# Patient Record
Sex: FEMALE | Race: BLACK OR AFRICAN AMERICAN | Employment: OTHER | URBAN - METROPOLITAN AREA
[De-identification: names, ages, dates, MRNs, and addresses within clinical notes are randomized per-mention and may not be internally consistent; named-entity substitution may affect disease eponyms.]

---

## 2021-11-19 ENCOUNTER — APPOINTMENT (OUTPATIENT)
Dept: CT IMAGING | Age: 76
DRG: 683 | End: 2021-11-19
Attending: EMERGENCY MEDICINE
Payer: MEDICARE

## 2021-11-19 ENCOUNTER — HOSPITAL ENCOUNTER (INPATIENT)
Age: 76
LOS: 3 days | Discharge: HOME | DRG: 683 | End: 2021-11-22
Attending: STUDENT IN AN ORGANIZED HEALTH CARE EDUCATION/TRAINING PROGRAM | Admitting: HOSPITALIST
Payer: MEDICARE

## 2021-11-19 DIAGNOSIS — E87.1 HYPONATREMIA: ICD-10-CM

## 2021-11-19 DIAGNOSIS — R11.2 NAUSEA AND VOMITING IN ADULT PATIENT: ICD-10-CM

## 2021-11-19 DIAGNOSIS — E86.0 DEHYDRATION: ICD-10-CM

## 2021-11-19 DIAGNOSIS — N17.9 AKI (ACUTE KIDNEY INJURY) (HCC): Primary | ICD-10-CM

## 2021-11-19 DIAGNOSIS — N39.0 ACUTE UTI: ICD-10-CM

## 2021-11-19 LAB
ALBUMIN SERPL-MCNC: 3.7 G/DL (ref 3.4–5)
ALBUMIN/GLOB SERPL: 0.7 {RATIO} (ref 0.8–1.7)
ALP SERPL-CCNC: 89 U/L (ref 45–117)
ALT SERPL-CCNC: 20 U/L (ref 13–56)
ANION GAP SERPL CALC-SCNC: 12 MMOL/L (ref 3–18)
APPEARANCE UR: ABNORMAL
AST SERPL-CCNC: 17 U/L (ref 10–38)
ATRIAL RATE: 110 BPM
BACTERIA URNS QL MICRO: ABNORMAL /HPF
BASOPHILS # BLD: 0.1 K/UL (ref 0–0.1)
BASOPHILS NFR BLD: 0 % (ref 0–2)
BILIRUB SERPL-MCNC: 0.4 MG/DL (ref 0.2–1)
BILIRUB UR QL: ABNORMAL
BUN SERPL-MCNC: 51 MG/DL (ref 7–18)
BUN/CREAT SERPL: 24 (ref 12–20)
CALCIUM SERPL-MCNC: 10.7 MG/DL (ref 8.5–10.1)
CALCULATED P AXIS, ECG09: 50 DEGREES
CALCULATED R AXIS, ECG10: -23 DEGREES
CALCULATED T AXIS, ECG11: 49 DEGREES
CHLORIDE SERPL-SCNC: 95 MMOL/L (ref 100–111)
CO2 SERPL-SCNC: 23 MMOL/L (ref 21–32)
COLOR UR: ABNORMAL
COVID-19 RAPID TEST, COVR: NOT DETECTED
CREAT SERPL-MCNC: 2.11 MG/DL (ref 0.6–1.3)
DIAGNOSIS, 93000: NORMAL
DIFFERENTIAL METHOD BLD: ABNORMAL
EOSINOPHIL # BLD: 0.1 K/UL (ref 0–0.4)
EOSINOPHIL NFR BLD: 1 % (ref 0–5)
EPITH CASTS URNS QL MICRO: ABNORMAL /LPF (ref 0–5)
ERYTHROCYTE [DISTWIDTH] IN BLOOD BY AUTOMATED COUNT: 13.5 % (ref 11.6–14.5)
EST. AVERAGE GLUCOSE BLD GHB EST-MCNC: 209 MG/DL
GLOBULIN SER CALC-MCNC: 5.1 G/DL (ref 2–4)
GLUCOSE BLD STRIP.AUTO-MCNC: 131 MG/DL (ref 70–110)
GLUCOSE BLD STRIP.AUTO-MCNC: 159 MG/DL (ref 70–110)
GLUCOSE SERPL-MCNC: 196 MG/DL (ref 74–99)
GLUCOSE UR STRIP.AUTO-MCNC: NEGATIVE MG/DL
HBA1C MFR BLD: 8.9 % (ref 4.2–5.6)
HCT VFR BLD AUTO: 40.1 % (ref 35–45)
HGB BLD-MCNC: 13 G/DL (ref 12–16)
HGB UR QL STRIP: ABNORMAL
IMM GRANULOCYTES # BLD AUTO: 0.1 K/UL (ref 0–0.04)
IMM GRANULOCYTES NFR BLD AUTO: 1 % (ref 0–0.5)
INR PPP: 1 (ref 0.8–1.2)
KETONES UR QL STRIP.AUTO: NEGATIVE MG/DL
LEUKOCYTE ESTERASE UR QL STRIP.AUTO: ABNORMAL
LYMPHOCYTES # BLD: 3.1 K/UL (ref 0.9–3.6)
LYMPHOCYTES NFR BLD: 20 % (ref 21–52)
MCH RBC QN AUTO: 28.2 PG (ref 24–34)
MCHC RBC AUTO-ENTMCNC: 32.4 G/DL (ref 31–37)
MCV RBC AUTO: 87 FL (ref 78–100)
MONOCYTES # BLD: 2.2 K/UL (ref 0.05–1.2)
MONOCYTES NFR BLD: 14 % (ref 3–10)
NEUTS SEG # BLD: 9.8 K/UL (ref 1.8–8)
NEUTS SEG NFR BLD: 64 % (ref 40–73)
NITRITE UR QL STRIP.AUTO: NEGATIVE
NRBC # BLD: 0 K/UL (ref 0–0.01)
NRBC BLD-RTO: 0 PER 100 WBC
P-R INTERVAL, ECG05: 172 MS
PH UR STRIP: 7.5 [PH] (ref 5–8)
PLATELET # BLD AUTO: 517 K/UL (ref 135–420)
PMV BLD AUTO: 9.8 FL (ref 9.2–11.8)
POTASSIUM SERPL-SCNC: 4.1 MMOL/L (ref 3.5–5.5)
PROT SERPL-MCNC: 8.8 G/DL (ref 6.4–8.2)
PROT UR STRIP-MCNC: 300 MG/DL
PROTHROMBIN TIME: 13.1 SEC (ref 11.5–15.2)
Q-T INTERVAL, ECG07: 336 MS
QRS DURATION, ECG06: 60 MS
QTC CALCULATION (BEZET), ECG08: 454 MS
RBC # BLD AUTO: 4.61 M/UL (ref 4.2–5.3)
RBC #/AREA URNS HPF: ABNORMAL /HPF (ref 0–5)
SODIUM SERPL-SCNC: 130 MMOL/L (ref 136–145)
SOURCE, COVRS: NORMAL
SP GR UR REFRACTOMETRY: 1.02 (ref 1–1.03)
TROPONIN I SERPL-MCNC: <0.02 NG/ML (ref 0–0.04)
UROBILINOGEN UR QL STRIP.AUTO: 0.2 EU/DL (ref 0.2–1)
VENTRICULAR RATE, ECG03: 110 BPM
WBC # BLD AUTO: 15.4 K/UL (ref 4.6–13.2)
WBC URNS QL MICRO: ABNORMAL /HPF (ref 0–5)

## 2021-11-19 PROCEDURE — 93005 ELECTROCARDIOGRAM TRACING: CPT

## 2021-11-19 PROCEDURE — 74011250636 HC RX REV CODE- 250/636: Performed by: EMERGENCY MEDICINE

## 2021-11-19 PROCEDURE — 84484 ASSAY OF TROPONIN QUANT: CPT

## 2021-11-19 PROCEDURE — 80053 COMPREHEN METABOLIC PANEL: CPT

## 2021-11-19 PROCEDURE — 85610 PROTHROMBIN TIME: CPT

## 2021-11-19 PROCEDURE — 65270000029 HC RM PRIVATE

## 2021-11-19 PROCEDURE — 85025 COMPLETE CBC W/AUTO DIFF WBC: CPT

## 2021-11-19 PROCEDURE — 74011000250 HC RX REV CODE- 250: Performed by: EMERGENCY MEDICINE

## 2021-11-19 PROCEDURE — 81001 URINALYSIS AUTO W/SCOPE: CPT

## 2021-11-19 PROCEDURE — 74011250636 HC RX REV CODE- 250/636: Performed by: HOSPITALIST

## 2021-11-19 PROCEDURE — 87635 SARS-COV-2 COVID-19 AMP PRB: CPT

## 2021-11-19 PROCEDURE — 74011636637 HC RX REV CODE- 636/637: Performed by: HOSPITALIST

## 2021-11-19 PROCEDURE — 99284 EMERGENCY DEPT VISIT MOD MDM: CPT

## 2021-11-19 PROCEDURE — 96360 HYDRATION IV INFUSION INIT: CPT

## 2021-11-19 PROCEDURE — 96361 HYDRATE IV INFUSION ADD-ON: CPT

## 2021-11-19 PROCEDURE — 74176 CT ABD & PELVIS W/O CONTRAST: CPT

## 2021-11-19 PROCEDURE — 74011250637 HC RX REV CODE- 250/637: Performed by: HOSPITALIST

## 2021-11-19 PROCEDURE — 82962 GLUCOSE BLOOD TEST: CPT

## 2021-11-19 PROCEDURE — 87086 URINE CULTURE/COLONY COUNT: CPT

## 2021-11-19 PROCEDURE — 83036 HEMOGLOBIN GLYCOSYLATED A1C: CPT

## 2021-11-19 RX ORDER — PROMETHAZINE HYDROCHLORIDE 25 MG/1
12.5 TABLET ORAL
Status: DISCONTINUED | OUTPATIENT
Start: 2021-11-19 | End: 2021-11-22 | Stop reason: HOSPADM

## 2021-11-19 RX ORDER — ROPINIROLE 1 MG/1
0.5 TABLET, FILM COATED ORAL DAILY
COMMUNITY

## 2021-11-19 RX ORDER — ACETAMINOPHEN 325 MG/1
650 TABLET ORAL
Status: DISCONTINUED | OUTPATIENT
Start: 2021-11-19 | End: 2021-11-22 | Stop reason: HOSPADM

## 2021-11-19 RX ORDER — SODIUM CHLORIDE 0.9 % (FLUSH) 0.9 %
5-40 SYRINGE (ML) INJECTION EVERY 8 HOURS
Status: DISCONTINUED | OUTPATIENT
Start: 2021-11-19 | End: 2021-11-22 | Stop reason: HOSPADM

## 2021-11-19 RX ORDER — LISINOPRIL AND HYDROCHLOROTHIAZIDE 12.5; 2 MG/1; MG/1
1 TABLET ORAL DAILY
COMMUNITY
End: 2021-11-22

## 2021-11-19 RX ORDER — MONTELUKAST SODIUM 10 MG/1
10 TABLET ORAL DAILY
Status: DISCONTINUED | OUTPATIENT
Start: 2021-11-20 | End: 2021-11-20

## 2021-11-19 RX ORDER — INSULIN LISPRO 100 [IU]/ML
INJECTION, SOLUTION INTRAVENOUS; SUBCUTANEOUS
Status: DISCONTINUED | OUTPATIENT
Start: 2021-11-19 | End: 2021-11-21

## 2021-11-19 RX ORDER — ROSUVASTATIN CALCIUM 10 MG/1
10 TABLET, COATED ORAL
COMMUNITY

## 2021-11-19 RX ORDER — DEXTROSE 50 % IN WATER (D50W) INTRAVENOUS SYRINGE
25-50 AS NEEDED
Status: DISCONTINUED | OUTPATIENT
Start: 2021-11-19 | End: 2021-11-22 | Stop reason: HOSPADM

## 2021-11-19 RX ORDER — ROSUVASTATIN CALCIUM 10 MG/1
10 TABLET, COATED ORAL
Status: DISCONTINUED | OUTPATIENT
Start: 2021-11-19 | End: 2021-11-22 | Stop reason: HOSPADM

## 2021-11-19 RX ORDER — HEPARIN SODIUM 5000 [USP'U]/ML
5000 INJECTION, SOLUTION INTRAVENOUS; SUBCUTANEOUS EVERY 8 HOURS
Status: DISCONTINUED | OUTPATIENT
Start: 2021-11-19 | End: 2021-11-22 | Stop reason: HOSPADM

## 2021-11-19 RX ORDER — MONTELUKAST SODIUM 10 MG/1
10 TABLET ORAL DAILY
COMMUNITY

## 2021-11-19 RX ORDER — INSULIN ASPART 100 [IU]/ML
INJECTION, SUSPENSION SUBCUTANEOUS
COMMUNITY

## 2021-11-19 RX ORDER — AMLODIPINE BESYLATE 5 MG/1
5 TABLET ORAL DAILY
Status: DISCONTINUED | OUTPATIENT
Start: 2021-11-20 | End: 2021-11-22 | Stop reason: HOSPADM

## 2021-11-19 RX ORDER — INSULIN GLARGINE 100 [IU]/ML
20 INJECTION, SOLUTION SUBCUTANEOUS
Status: DISCONTINUED | OUTPATIENT
Start: 2021-11-19 | End: 2021-11-22 | Stop reason: HOSPADM

## 2021-11-19 RX ORDER — SODIUM CHLORIDE 9 MG/ML
75 INJECTION, SOLUTION INTRAVENOUS CONTINUOUS
Status: DISCONTINUED | OUTPATIENT
Start: 2021-11-19 | End: 2021-11-22 | Stop reason: HOSPADM

## 2021-11-19 RX ORDER — ACETAMINOPHEN 650 MG/1
650 SUPPOSITORY RECTAL
Status: DISCONTINUED | OUTPATIENT
Start: 2021-11-19 | End: 2021-11-22 | Stop reason: HOSPADM

## 2021-11-19 RX ORDER — MAGNESIUM SULFATE 100 %
4 CRYSTALS MISCELLANEOUS AS NEEDED
Status: DISCONTINUED | OUTPATIENT
Start: 2021-11-19 | End: 2021-11-22 | Stop reason: HOSPADM

## 2021-11-19 RX ORDER — ONDANSETRON 2 MG/ML
4 INJECTION INTRAMUSCULAR; INTRAVENOUS
Status: DISCONTINUED | OUTPATIENT
Start: 2021-11-19 | End: 2021-11-22 | Stop reason: HOSPADM

## 2021-11-19 RX ORDER — FACIAL-BODY WIPES
10 EACH TOPICAL DAILY PRN
Status: DISCONTINUED | OUTPATIENT
Start: 2021-11-19 | End: 2021-11-22 | Stop reason: HOSPADM

## 2021-11-19 RX ORDER — SODIUM CHLORIDE 0.9 % (FLUSH) 0.9 %
5-40 SYRINGE (ML) INJECTION AS NEEDED
Status: DISCONTINUED | OUTPATIENT
Start: 2021-11-19 | End: 2021-11-22 | Stop reason: HOSPADM

## 2021-11-19 RX ADMIN — HEPARIN SODIUM 5000 UNITS: 5000 INJECTION INTRAVENOUS; SUBCUTANEOUS at 22:59

## 2021-11-19 RX ADMIN — WATER 1 G: 1 INJECTION INTRAMUSCULAR; INTRAVENOUS; SUBCUTANEOUS at 15:24

## 2021-11-19 RX ADMIN — SODIUM CHLORIDE 75 ML/HR: 900 INJECTION, SOLUTION INTRAVENOUS at 16:43

## 2021-11-19 RX ADMIN — INSULIN LISPRO 2 UNITS: 100 INJECTION, SOLUTION INTRAVENOUS; SUBCUTANEOUS at 22:58

## 2021-11-19 RX ADMIN — SODIUM CHLORIDE 1000 ML: 9 INJECTION, SOLUTION INTRAVENOUS at 12:46

## 2021-11-19 RX ADMIN — HEPARIN SODIUM 5000 UNITS: 5000 INJECTION INTRAVENOUS; SUBCUTANEOUS at 16:21

## 2021-11-19 RX ADMIN — ROSUVASTATIN CALCIUM 10 MG: 10 TABLET, COATED ORAL at 22:58

## 2021-11-19 RX ADMIN — INSULIN GLARGINE 20 UNITS: 100 INJECTION, SOLUTION SUBCUTANEOUS at 22:58

## 2021-11-19 RX ADMIN — Medication 10 ML: at 22:58

## 2021-11-19 RX ADMIN — Medication 10 ML: at 16:42

## 2021-11-19 RX ADMIN — SODIUM CHLORIDE 1000 ML: 9 INJECTION, SOLUTION INTRAVENOUS at 11:04

## 2021-11-19 NOTE — ED TRIAGE NOTES
Ambulatory patient arrives with  with complaints of UTI x10 days. Was sent here by PCP for further evaluation. C/o general malaise.

## 2021-11-19 NOTE — H&P
History & Physical    Patient: Nancy Delaney MRN: [id number]  CSN: [id number]    YOB: 1945  Age: 68 y.o. Sex: female      DOA: 11/19/2021  Primary Care Provider:  Concepcion Varela MD      Assessment/Plan     Hospital Problems  Never Reviewed          Codes Class Noted POA    Dehydration ICD-10-CM: E86.0  ICD-9-CM: 276.51  11/19/2021 Unknown        Hyponatremia ICD-10-CM: E87.1  ICD-9-CM: 276.1  11/19/2021 Unknown        Acute UTI ICD-10-CM: N39.0  ICD-9-CM: 599.0  11/19/2021 Unknown        JOHNATHAN (acute kidney injury) ([address] ICD-10-CM: N17.9  ICD-9-CM: 584.9  11/19/2021 Unknown        Diabetes ([address] ICD-10-CM: E11.9  ICD-9-CM: 250.00  Unknown Unknown        Hypertension ICD-10-CM: I10  ICD-9-CM: 401.9  Unknown Unknown            Nancy Delaney is a 68 y.o. female with hypertension, diabetes was admitted due to uti failure of out-pt treatment, dehydration and johnathan     Admit to medical     Urinary tract infection  Failed outpatient treatment  Changed to cefepime  Follow-up with UA culture    Dehydration  IV hydration  Monitor urine output    Acute renal failure  Like due to prerenal  Mild left hydroureteronephrosis without evidence of obstructing lesion. Avoid IV contrast, IV hydration. Renal dose medication avoid NSAIDs    Hyponatremia  NS infusion  Received 2 L normal saline bolus  Continue monitoring    DM type II ,  -on lantus,  ssi, diabetic diet , hypoglycemia protocol       HTN, accelerated  Hold home losartan due to johnathan, put norvasc      full code   Please note that this dictation was completed with [employer], the [employer] voice recognition software. Quite often unanticipated grammatical, syntax, homophones, and other interpretive errors are inadvertently transcribed by the computer software. Please disregard these errors.   Please excuse any errors that have escaped final proofreading    Estimate  length of stay : tbd     DVT : heparin ppi proph  CC: Dysuria       HPI: Tamika Patel is a 68 y.o. female with hypertension, diabetes, presented to ER due to dysuria, increasing frequency of urination. She has been treated as urinary tract infection per [facility] urgent care for  9 days ago. She completed p.o. antibiotics cefdinir from record. Symptoms does not improving she presented nausea or vomiting with diarrhea for 2-3 days due to abx, but resolved now. UA indicated urinary tract infection, CT abdomen cystitis and mild  hydronephrosis without obstruction. She presented with a leukocytosis, creatinine 2.1 sodium 130. Received covid 19 vaccine and no coivd 19 exposure. Denies any slurred speech/headache/cp/n/v/blurred vission/d/c/palpitation/gait change/bleeding. Denies smoking/ any alcohol or drug use.  was at the bedside. Visit Vitals  /62   Pulse 98   Temp 97 °F (36.1 °C)   Resp 16   Ht 5' 6\" (1.676 m)   Wt 85.7 kg (189 lb)   SpO2 97%   BMI 30.51 kg/m²      O2 Device: None (Room air)      Past Medical History:   Diagnosis Date    Diabetes ([address]     Hypertension        Past Surgical History:   Procedure Laterality Date    HX HYSTERECTOMY      HX ORTHOPAEDIC      right shoulder    NM BREAST SURGERY PROCEDURE UNLISTED       Fhx: HTN and DM     Social History     Socioeconomic History    Marital status:    Tobacco Use    Smoking status: Never Smoker    Smokeless tobacco: Never Used   Substance and Sexual Activity    Alcohol use: Not Currently    Drug use: Never       Prior to Admission medications    Medication Sig Start Date End Date Taking? Authorizing Provider   insulin aspart protamine/insulin aspart (NovoLOG Mix 70-30FlexPen U-100) 100 unit/mL (70-30) inpn by SubCUTAneous route. Yes Adrian, MD Danish   exenatide (BYETTA) 5 mcg/dose (250 mcg/mL) 1.2 mL injection 5 mcg by SubCUTAneous route every seven (7) days. Yes Adrian, MD Danish   SITagliptin (Januvia) 25 mg tablet Take 25 mg by mouth daily.    Yes Danish Campbell MD rosuvastatin (Crestor) 10 mg tablet Take 10 mg by mouth nightly. Yes Other, MD Danish   montelukast (SINGULAIR) 10 mg tablet Take 10 mg by mouth daily. Yes Adrian, MD Danish   eye lubricant combination no.1 2-0.9-1.8 % drop Apply  to eye. Yes Other, MD Danish   lisinopril-hydroCHLOROthiazide (PRINZIDE, ZESTORETIC) 20-12.5 mg per tablet Take 1 Tablet by mouth daily. Yes Other, MD Danish       Allergies   Allergen Reactions    Bacitracin Rash       Review of Systems  Gen: No fever, chills, malaise, weight loss/gain. Heent: No headache, rhinorrhea, epistaxis, ear pain, hearing loss, sinus pain, neck pain/stiffness, sore throat. Heart: No chest pain, palpitations, JERRY, pnd, or orthopnea. Resp: No cough, hemoptysis, wheezing and shortness of breath. GI: No nausea, vomiting, + diarrhea-resolved now , constipation, melena or hematochezia. + lower abdomen pain   : No urinary obstruction, + dysuria , no hematuria. Increased urinary frequency   Derm: No rash, new skin lesion or pruritis. Musc/skeletal: no bone or joint complains. Vasc: No edema, cyanosis or claudication. Endo: No heat/cold intolerance, no polyuria,polydipsia or polyphagia. Neuro: No unilateral weakness, numbness, tingling. No seizures. Heme: No easy bruising or bleeding. Physical Exam:     Physical Exam:  Visit Vitals  /62   Pulse 98   Temp 97 °F (36.1 °C)   Resp 16   Ht 5' 6\" (1.676 m)   Wt 85.7 kg (189 lb)   SpO2 97%   BMI 30.51 kg/m²      O2 Device: None (Room air)    Temp (24hrs), Av °F (36.1 °C), Min:97 °F (36.1 °C), Max:97 °F (36.1 °C)    No intake/output data recorded. No intake/output data recorded. General:  Awake, cooperative, no distress. Head:  Normocephalic, without obvious abnormality, atraumatic. Eyes:  Conjunctivae/corneas clear, sclera anicteric, PERRL, EOMs intact. Nose: Nares normal. No drainage or sinus tenderness.    Throat: Lips, mucosa, and tongue normal. .   Neck: Supple, symmetrical, trachea midline, no adenopathy. Lungs:   Clear to auscultation bilaterally. Heart:  Regular rate and rhythm, S1, S2 normal, no murmur, click, rub or gallop. Abdomen: Soft, mild superpubic tender. Bowel sounds normal. No masses,  No organomegaly. Extremities: Extremities normal, atraumatic, no cyanosis or edema. Pulses: 2+ and symmetric all extremities. Skin: Skin color-pink, texture, turgor normal. No rashes or lesions. Capillary refill normal    Neurologic: CNII-XII intact. No focal motor or sensory deficit.        Labs Reviewed:    BMP:   Lab Results   Component Value Date/Time     (L) 11/19/2021 11:01 AM    K 4.1 11/19/2021 11:01 AM    CL 95 (L) 11/19/2021 11:01 AM    CO2 23 11/19/2021 11:01 AM    AGAP 12 11/19/2021 11:01 AM     (H) 11/19/2021 11:01 AM    BUN 51 (H) 11/19/2021 11:01 AM    CREA 2.11 (H) 11/19/2021 11:01 AM    GFRAA 28 (L) 11/19/2021 11:01 AM    GFRNA 23 (L) 11/19/2021 11:01 AM     CMP:   Lab Results   Component Value Date/Time     (L) 11/19/2021 11:01 AM    K 4.1 11/19/2021 11:01 AM    CL 95 (L) 11/19/2021 11:01 AM    CO2 23 11/19/2021 11:01 AM    AGAP 12 11/19/2021 11:01 AM     (H) 11/19/2021 11:01 AM    BUN 51 (H) 11/19/2021 11:01 AM    CREA 2.11 (H) 11/19/2021 11:01 AM    GFRAA 28 (L) 11/19/2021 11:01 AM    GFRNA 23 (L) 11/19/2021 11:01 AM    CA 10.7 (H) 11/19/2021 11:01 AM    ALB 3.7 11/19/2021 11:01 AM    TP 8.8 (H) 11/19/2021 11:01 AM    GLOB 5.1 (H) 11/19/2021 11:01 AM    AGRAT 0.7 (L) 11/19/2021 11:01 AM    ALT 20 11/19/2021 11:01 AM     CBC:   Lab Results   Component Value Date/Time    WBC 15.4 (H) 11/19/2021 11:01 AM    HGB 13.0 11/19/2021 11:01 AM    HCT 40.1 11/19/2021 11:01 AM     (H) 11/19/2021 11:01 AM     All Cardiac Markers in the last 24 hours:   Lab Results   Component Value Date/Time    TROIQ <0.02 11/19/2021 11:01 AM     Recent Glucose Results:   Lab Results   Component Value Date/Time     (H) 11/19/2021 11:01 AM     ABG: No results found for: PH, PHI, PCO2, PCO2I, PO2, PO2I, HCO3, HCO3I, FIO2, FIO2I  COAGS:   Lab Results   Component Value Date/Time    PTP 13.1 11/19/2021 11:01 AM    INR 1.0 11/19/2021 11:01 AM     Liver Panel:   Lab Results   Component Value Date/Time    ALB 3.7 11/19/2021 11:01 AM    TP 8.8 (H) 11/19/2021 11:01 AM    GLOB 5.1 (H) 11/19/2021 11:01 AM    AGRAT 0.7 (L) 11/19/2021 11:01 AM    ALT 20 11/19/2021 11:01 AM    AP 89 11/19/2021 11:01 AM     Pancreatic Markers: No results found for: AMYLPOCT, AML, LIPPOCT, LPSE    CT ABD PELV WO CONT    Result Date: 11/19/2021  EXAM: CT of the abdomen and pelvis INDICATION: Pain. COMPARISON: None. TECHNIQUE: Axial CT imaging of the abdomen and pelvis was performed without intravenous contrast. Multiplanar reformats were generated. One or more dose reduction techniques were used on this CT: automated exposure control, adjustment of the mAs and/or kVp according to patient size, and iterative reconstruction techniques. The specific techniques used on this CT exam have been documented in the patient's electronic medical record. Digital Imaging and Communications in Medicine (DICOM) format image data are available to nonaffiliated external healthcare facilities or entities on a secure, media free, reciprocally searchable basis with patient authorization for at least a 12-month period after this study. _______________ FINDINGS: LOWER CHEST: Unremarkable. LIVER, BILIARY: Liver is normal. No biliary dilation. Cholecystectomy. PANCREAS: Normal. SPLEEN: Normal. ADRENALS: Normal. KIDNEYS/URETERS/BLADDER: Mild left hydroureteronephrosis without evidence of obstructing lesion. No renal calcification. Marked urinary bladder distention with mural thickening and perivesicular stranding. PELVIC ORGANS: Unremarkable. VASCULATURE: Vascular calcifications. LYMPH NODES: No enlarged lymph nodes. GASTROINTESTINAL TRACT: No bowel dilation or wall thickening. Small hiatal hernia. BONES: No acute or aggressive osseous abnormalities identified. OTHER: None. _______________     Marked urinary bladder distention with mural thickening and perivesicular stranding, likely reflecting cystitis. Correlate with urinalysis. Mild left hydroureteronephrosis without evidence of obstructing lesion, likely sequela of urinary retention and cystitis.     Procedures/imaging: see electronic medical records for all procedures/Xrays and details which were not copied into this note but were reviewed prior to creation of Koffi Melton MD, Internal Medicine     CC: Christie Singh MD

## 2021-11-19 NOTE — ED PROVIDER NOTES
EMERGENCY DEPARTMENT HISTORY AND PHYSICAL EXAM    Date: 11/19/2021  Patient Name: Colleen Ocampo    History of Presenting Illness     Chief Complaint   Patient presents with    Bladder Infection         History Provided By: Patient and Patient's     Additional History (Context): Colleen Ocampo is a 68 y.o. female with diabetes and hypertension who presents with complaint of dysuria, urinary frequency, lower abdominal pain and diarrhea. Her symptoms have been going on for 9 days. She went to [email or web address] and was prescribed an antibiotic for her UTI. The night before going she had mild nausea. [facility] also wrote her prescription for an antiemetic, this helped her nausea. However, the antibiotics have created diarrheal stools for her but hasn't had diarrheal stools in past several days. Was seen in her PCP office this morning. Trace blood on rectal there but pt hasn't noticed kodi blood in stool. She said her primary sent her over because her vital signs were off and she now had an elevated white count. PCP: Leonie Piña MD    Current Facility-Administered Medications   Medication Dose Route Frequency Provider Last Rate Last Admin    cefepime (MAXIPIME) 1 g in sterile water (preservative free) 10 mL IV syringe  1 g IntraVENous Q24H Usha Soriano PA         Current Outpatient Medications   Medication Sig Dispense Refill    insulin aspart protamine/insulin aspart (NovoLOG Mix 70-30FlexPen U-100) 100 unit/mL (70-30) inpn by SubCUTAneous route.  exenatide (BYETTA) 5 mcg/dose (250 mcg/mL) 1.2 mL injection 5 mcg by SubCUTAneous route every seven (7) days.  SITagliptin (Januvia) 25 mg tablet Take 25 mg by mouth daily.  rosuvastatin (Crestor) 10 mg tablet Take 10 mg by mouth nightly.  montelukast (SINGULAIR) 10 mg tablet Take 10 mg by mouth daily.  eye lubricant combination no.1 2-0.9-1.8 % drop Apply  to eye.       lisinopril-hydroCHLOROthiazide (PRINZIDE, ZESTORETIC) 20-12.5 mg per tablet Take 1 Tablet by mouth daily. Past History     Past Medical History:  Past Medical History:   Diagnosis Date    Diabetes ([address]     Hypertension        Past Surgical History:  Past Surgical History:   Procedure Laterality Date    HX HYSTERECTOMY      HX ORTHOPAEDIC      right shoulder    FL BREAST SURGERY PROCEDURE UNLISTED         Family History:  History reviewed. No pertinent family history. Social History:  Social History     Tobacco Use    Smoking status: Never Smoker    Smokeless tobacco: Never Used   Substance Use Topics    Alcohol use: Not Currently    Drug use: Never       Allergies: Allergies   Allergen Reactions    Bacitracin Rash         Review of Systems   Review of Systems   HENT: Negative. Eyes: Negative. Respiratory: Negative. Negative for shortness of breath. Cardiovascular: Negative for chest pain. Gastrointestinal: Positive for abdominal pain, diarrhea, nausea and vomiting. Endocrine: Negative. Genitourinary: Positive for dysuria and frequency. Musculoskeletal: Negative. Skin: Negative. Allergic/Immunologic: Negative for immunocompromised state. Hematological: Does not bruise/bleed easily. Psychiatric/Behavioral: Negative. All Other Systems Negative  Physical Exam     Vitals:    11/19/21 1018 11/19/21 1252   BP: 139/71 119/62   Pulse: (!) 113 98   Resp: 17 16   Temp: 97 °F (36.1 °C)    SpO2: 97%    Weight: 85.7 kg (189 lb)    Height: 5' 6\" (1.676 m)      Physical Exam  Vitals and nursing note reviewed. Constitutional:       Appearance: She is well-developed. HENT:      Head: Normocephalic and atraumatic. Right Ear: External ear normal.      Left Ear: External ear normal.      Nose: Nose normal.   Eyes:      Conjunctiva/sclera: Conjunctivae normal.      Pupils: Pupils are equal, round, and reactive to light. Neck:      Vascular: No JVD. Trachea: No tracheal deviation. Cardiovascular:      Rate and Rhythm: Normal rate and regular rhythm. Heart sounds: Normal heart sounds. No murmur heard. No friction rub. No gallop. Pulmonary:      Effort: Pulmonary effort is normal. No respiratory distress. Breath sounds: Normal breath sounds. No wheezing or rales. Abdominal:      General: Bowel sounds are normal. There is no distension. Palpations: Abdomen is soft. There is no mass. Tenderness: There is abdominal tenderness. There is no guarding or rebound. Comments: Supra pubic tenderness   Musculoskeletal:         General: No tenderness. Normal range of motion. Cervical back: Normal range of motion and neck supple. Skin:     General: Skin is warm and dry. Findings: No rash. Neurological:      Mental Status: She is alert and oriented to person, place, and time. Cranial Nerves: No cranial nerve deficit. Deep Tendon Reflexes: Reflexes are normal and symmetric. Psychiatric:         Behavior: Behavior normal.            Diagnostic Study Results     Labs -     Recent Results (from the past 12 hour(s))   METABOLIC PANEL, COMPREHENSIVE    Collection Time: 11/19/21 11:01 AM   Result Value Ref Range    Sodium 130 (L) 136 - 145 mmol/L    Potassium 4.1 3.5 - 5.5 mmol/L    Chloride 95 (L) 100 - 111 mmol/L    CO2 23 21 - 32 mmol/L    Anion gap 12 3.0 - 18 mmol/L    Glucose 196 (H) 74 - 99 mg/dL    BUN 51 (H) 7.0 - 18 MG/DL    Creatinine 2.11 (H) 0.6 - 1.3 MG/DL    BUN/Creatinine ratio 24 (H) 12 - 20      GFR est AA 28 (L) >60 ml/min/1.73m2    GFR est non-AA 23 (L) >60 ml/min/1.73m2    Calcium 10.7 (H) 8.5 - 10.1 MG/DL    Bilirubin, total 0.4 0.2 - 1.0 MG/DL    ALT (SGPT) 20 13 - 56 U/L    AST (SGOT) 17 10 - 38 U/L    Alk.  phosphatase 89 45 - 117 U/L    Protein, total 8.8 (H) 6.4 - 8.2 g/dL    Albumin 3.7 3.4 - 5.0 g/dL    Globulin 5.1 (H) 2.0 - 4.0 g/dL    A-G Ratio 0.7 (L) 0.8 - 1.7     CBC WITH AUTOMATED DIFF    Collection Time: 11/19/21 11:01 AM Result Value Ref Range    WBC 15.4 (H) 4.6 - 13.2 K/uL    RBC 4.61 4.20 - 5.30 M/uL    HGB 13.0 12.0 - 16.0 g/dL    HCT 40.1 35.0 - 45.0 %    MCV 87.0 78.0 - 100.0 FL    MCH 28.2 24.0 - 34.0 PG    MCHC 32.4 31.0 - 37.0 g/dL    RDW 13.5 11.6 - 14.5 %    PLATELET 010 (H) 631 - 420 K/uL    MPV 9.8 9.2 - 11.8 FL    NRBC 0.0 0  WBC    ABSOLUTE NRBC 0.00 0.00 - 0.01 K/uL    NEUTROPHILS 64 40 - 73 %    LYMPHOCYTES 20 (L) 21 - 52 %    MONOCYTES 14 (H) 3 - 10 %    EOSINOPHILS 1 0 - 5 %    BASOPHILS 0 0 - 2 %    IMMATURE GRANULOCYTES 1 (H) 0.0 - 0.5 %    ABS. NEUTROPHILS 9.8 (H) 1.8 - 8.0 K/UL    ABS. LYMPHOCYTES 3.1 0.9 - 3.6 K/UL    ABS. MONOCYTES 2.2 (H) 0.05 - 1.2 K/UL    ABS. EOSINOPHILS 0.1 0.0 - 0.4 K/UL    ABS. BASOPHILS 0.1 0.0 - 0.1 K/UL    ABS. IMM. GRANS. 0.1 (H) 0.00 - 0.04 K/UL    DF AUTOMATED     PROTHROMBIN TIME + INR    Collection Time: 11/19/21 11:01 AM   Result Value Ref Range    Prothrombin time 13.1 11.5 - 15.2 sec    INR 1.0 0.8 - 1.2     TROPONIN I    Collection Time: 11/19/21 11:01 AM   Result Value Ref Range    Troponin-I, QT <0.02 0.0 - 0.045 NG/ML   URINALYSIS W/ RFLX MICROSCOPIC    Collection Time: 11/19/21 11:01 AM   Result Value Ref Range    Color RED      Appearance TURBID      Specific gravity 1.017 1.005 - 1.030      pH (UA) 7.5 5.0 - 8.0      Protein 300 (A) NEG mg/dL    Glucose Negative NEG mg/dL    Ketone Negative NEG mg/dL    Bilirubin SMALL (A) NEG      Blood LARGE (A) NEG      Urobilinogen 0.2 0.2 - 1.0 EU/dL    Nitrites Negative NEG      Leukocyte Esterase LARGE (A) NEG     URINE MICROSCOPIC ONLY    Collection Time: 11/19/21 11:01 AM   Result Value Ref Range    WBC TOO NUMEROUS TO COUNT 0 - 5 /hpf    RBC TOO NUMEROUS TO COUNT 0 - 5 /hpf    Epithelial cells 2+ 0 - 5 /lpf    Bacteria 2+ (A) NEG /hpf       Radiologic Studies -   CT ABD PELV WO CONT   Final Result      Marked urinary bladder distention with mural thickening and perivesicular   stranding, likely reflecting cystitis. Correlate with urinalysis. Mild left hydroureteronephrosis without evidence of obstructing lesion, likely   sequela of urinary retention and cystitis. CT Results  (Last 48 hours)               11/19/21 1350  CT ABD PELV WO CONT Final result    Impression:      Marked urinary bladder distention with mural thickening and perivesicular   stranding, likely reflecting cystitis. Correlate with urinalysis. Mild left hydroureteronephrosis without evidence of obstructing lesion, likely   sequela of urinary retention and cystitis. Narrative:  EXAM: CT of the abdomen and pelvis       INDICATION: Pain. COMPARISON: None. TECHNIQUE: Axial CT imaging of the abdomen and pelvis was performed without   intravenous contrast. Multiplanar reformats were generated. One or more dose reduction techniques were used on this CT: automated exposure   control, adjustment of the mAs and/or kVp according to patient size, and   iterative reconstruction techniques. The specific techniques used on this CT   exam have been documented in the patient's electronic medical record. Digital   Imaging and Communications in Medicine (DICOM) format image data are available   to nonaffiliated external healthcare facilities or entities on a secure, media   free, reciprocally searchable basis with patient authorization for at least a   12-month period after this study. _______________       FINDINGS:       LOWER CHEST: Unremarkable. LIVER, BILIARY: Liver is normal. No biliary dilation. Cholecystectomy. PANCREAS: Normal.       SPLEEN: Normal.       ADRENALS: Normal.       KIDNEYS/URETERS/BLADDER: Mild left hydroureteronephrosis without evidence of   obstructing lesion. No renal calcification. Marked urinary bladder distention   with mural thickening and perivesicular stranding. PELVIC ORGANS: Unremarkable. VASCULATURE: Vascular calcifications. LYMPH NODES: No enlarged lymph nodes. GASTROINTESTINAL TRACT: No bowel dilation or wall thickening. Small hiatal   hernia. BONES: No acute or aggressive osseous abnormalities identified. OTHER: None.       _______________               CXR Results  (Last 48 hours)    None            Medical Decision Making   I am the first provider for this patient. I reviewed the vital signs, available nursing notes, past medical history, past surgical history, family history and social history. Vital Signs-Reviewed the patient's vital signs. Records Reviewed: Nursing Notes    Procedures:  EKG    Date/Time: 11/19/2021 10:50 AM  Performed by: MAGNOLIA Candelario  Authorized by: MAGNOLIA Candelario     ECG reviewed by ED Physician in the absence of a cardiologist: yes    Interpretation:     Interpretation: abnormal    Rate:     ECG rate:  110    ECG rate assessment: tachycardic    Rhythm:     Rhythm: sinus tachycardia    Ectopy:     Ectopy: none    QRS:     QRS axis:  Normal    QRS intervals:  Normal  Conduction:     Conduction: normal    ST segments:     ST segments:  Normal  T waves:     T waves: normal          Provider Notes (Medical Decision Making): Culture from [facility] available for review from [facility] outpatient labs. Has an JOHNATHAN her urinary tract infection is still present and has a leukocytosis and bladder wall thickening on CT scan. More nauseated not wanting to eat because of her symptoms and has not had a bowel movement with loose stools in several days. Have admitted to the hospitalist service for rehydration IV antibiotics to correct her electrolyte abnormalities and JOHNATHAN. Given 2L IVF here in ED. Will send rapid COVID.     MED RECONCILIATION:  Current Facility-Administered Medications   Medication Dose Route Frequency    cefepime (MAXIPIME) 1 g in sterile water (preservative free) 10 mL IV syringe  1 g IntraVENous Q24H     Current Outpatient Medications   Medication Sig    insulin aspart protamine/insulin aspart (NovoLOG Mix 70-30FlexPen U-100) 100 unit/mL (70-30) inpn by SubCUTAneous route.  exenatide (BYETTA) 5 mcg/dose (250 mcg/mL) 1.2 mL injection 5 mcg by SubCUTAneous route every seven (7) days.  SITagliptin (Januvia) 25 mg tablet Take 25 mg by mouth daily.  rosuvastatin (Crestor) 10 mg tablet Take 10 mg by mouth nightly.  montelukast (SINGULAIR) 10 mg tablet Take 10 mg by mouth daily.  eye lubricant combination no.1 2-0.9-1.8 % drop Apply  to eye.  lisinopril-hydroCHLOROthiazide (PRINZIDE, ZESTORETIC) 20-12.5 mg per tablet Take 1 Tablet by mouth daily. Disposition:  admit      Follow-up Information    None         Current Discharge Medication List        3:19 PM  I have spent 35 minutes of critical care time involved in lab review, consultations with specialist, family decision-making, and documentation. During this entire length of time I was immediately available to the patient. Critical Care: The reason for providing this level of medical care for this critically ill patient was due a critical illness that impaired one or more vital organ systems such that there was a high probability of imminent or life threatening deterioration in the patients condition. This care involved high complexity decision making to assess, manipulate, and support vital system functions, to treat this degreee vital organ system failure and to prevent further life threatening deterioration of the patients condition. Diagnosis     Clinical Impression:   1. JOHNATHAN (acute kidney injury) ([facility] [address]    2. Acute UTI    3. Hyponatremia    4. Nausea and vomiting in adult patient    5.  Dehydration

## 2021-11-20 LAB
ANION GAP SERPL CALC-SCNC: 12 MMOL/L (ref 3–18)
BACTERIA SPEC CULT: NORMAL
BASOPHILS # BLD: 0 K/UL (ref 0–0.1)
BASOPHILS NFR BLD: 0 % (ref 0–2)
BUN SERPL-MCNC: 44 MG/DL (ref 7–18)
BUN/CREAT SERPL: 29 (ref 12–20)
CALCIUM SERPL-MCNC: 10.1 MG/DL (ref 8.5–10.1)
CC UR VC: NORMAL
CHLORIDE SERPL-SCNC: 100 MMOL/L (ref 100–111)
CO2 SERPL-SCNC: 21 MMOL/L (ref 21–32)
CREAT SERPL-MCNC: 1.51 MG/DL (ref 0.6–1.3)
DIFFERENTIAL METHOD BLD: ABNORMAL
EOSINOPHIL # BLD: 0.1 K/UL (ref 0–0.4)
EOSINOPHIL NFR BLD: 1 % (ref 0–5)
ERYTHROCYTE [DISTWIDTH] IN BLOOD BY AUTOMATED COUNT: 13.5 % (ref 11.6–14.5)
GLUCOSE BLD STRIP.AUTO-MCNC: 140 MG/DL (ref 70–110)
GLUCOSE BLD STRIP.AUTO-MCNC: 152 MG/DL (ref 70–110)
GLUCOSE BLD STRIP.AUTO-MCNC: 168 MG/DL (ref 70–110)
GLUCOSE BLD STRIP.AUTO-MCNC: 208 MG/DL (ref 70–110)
GLUCOSE SERPL-MCNC: 191 MG/DL (ref 74–99)
HCT VFR BLD AUTO: 35.8 % (ref 35–45)
HGB BLD-MCNC: 11.5 G/DL (ref 12–16)
IMM GRANULOCYTES # BLD AUTO: 0.1 K/UL (ref 0–0.04)
IMM GRANULOCYTES NFR BLD AUTO: 0 % (ref 0–0.5)
LYMPHOCYTES # BLD: 2.5 K/UL (ref 0.9–3.6)
LYMPHOCYTES NFR BLD: 22 % (ref 21–52)
MAGNESIUM SERPL-MCNC: 1.8 MG/DL (ref 1.6–2.6)
MCH RBC QN AUTO: 27.8 PG (ref 24–34)
MCHC RBC AUTO-ENTMCNC: 32.1 G/DL (ref 31–37)
MCV RBC AUTO: 86.5 FL (ref 78–100)
MONOCYTES # BLD: 1.6 K/UL (ref 0.05–1.2)
MONOCYTES NFR BLD: 14 % (ref 3–10)
NEUTS SEG # BLD: 7.4 K/UL (ref 1.8–8)
NEUTS SEG NFR BLD: 63 % (ref 40–73)
NRBC # BLD: 0 K/UL (ref 0–0.01)
NRBC BLD-RTO: 0 PER 100 WBC
PLATELET # BLD AUTO: 489 K/UL (ref 135–420)
PMV BLD AUTO: 9.7 FL (ref 9.2–11.8)
POTASSIUM SERPL-SCNC: 4 MMOL/L (ref 3.5–5.5)
RBC # BLD AUTO: 4.14 M/UL (ref 4.2–5.3)
SERVICE CMNT-IMP: NORMAL
SODIUM SERPL-SCNC: 133 MMOL/L (ref 136–145)
WBC # BLD AUTO: 11.7 K/UL (ref 4.6–13.2)

## 2021-11-20 PROCEDURE — 85025 COMPLETE CBC W/AUTO DIFF WBC: CPT

## 2021-11-20 PROCEDURE — 65270000029 HC RM PRIVATE

## 2021-11-20 PROCEDURE — 82962 GLUCOSE BLOOD TEST: CPT

## 2021-11-20 PROCEDURE — 36415 COLL VENOUS BLD VENIPUNCTURE: CPT

## 2021-11-20 PROCEDURE — 74011636637 HC RX REV CODE- 636/637: Performed by: HOSPITALIST

## 2021-11-20 PROCEDURE — 74011250636 HC RX REV CODE- 250/636: Performed by: EMERGENCY MEDICINE

## 2021-11-20 PROCEDURE — 74011250637 HC RX REV CODE- 250/637: Performed by: HOSPITALIST

## 2021-11-20 PROCEDURE — 83735 ASSAY OF MAGNESIUM: CPT

## 2021-11-20 PROCEDURE — 74011000250 HC RX REV CODE- 250: Performed by: EMERGENCY MEDICINE

## 2021-11-20 PROCEDURE — 80048 BASIC METABOLIC PNL TOTAL CA: CPT

## 2021-11-20 PROCEDURE — 74011250637 HC RX REV CODE- 250/637: Performed by: INTERNAL MEDICINE

## 2021-11-20 PROCEDURE — 74011250636 HC RX REV CODE- 250/636: Performed by: HOSPITALIST

## 2021-11-20 RX ORDER — ROPINIROLE 1 MG/1
1 TABLET, FILM COATED ORAL
Status: DISCONTINUED | OUTPATIENT
Start: 2021-11-20 | End: 2021-11-22 | Stop reason: HOSPADM

## 2021-11-20 RX ORDER — MONTELUKAST SODIUM 10 MG/1
10 TABLET ORAL DAILY
Status: DISCONTINUED | OUTPATIENT
Start: 2021-11-20 | End: 2021-11-22 | Stop reason: HOSPADM

## 2021-11-20 RX ORDER — SODIUM CHLORIDE 9 MG/ML
500 INJECTION, SOLUTION INTRAVENOUS ONCE
Status: DISCONTINUED | OUTPATIENT
Start: 2021-11-20 | End: 2021-11-20

## 2021-11-20 RX ORDER — ROPINIROLE 1 MG/1
1 TABLET, FILM COATED ORAL DAILY
Status: DISCONTINUED | OUTPATIENT
Start: 2021-11-20 | End: 2021-11-20

## 2021-11-20 RX ADMIN — INSULIN LISPRO 2 UNITS: 100 INJECTION, SOLUTION INTRAVENOUS; SUBCUTANEOUS at 21:16

## 2021-11-20 RX ADMIN — ACETAMINOPHEN 650 MG: 325 TABLET ORAL at 15:41

## 2021-11-20 RX ADMIN — INSULIN LISPRO 4 UNITS: 100 INJECTION, SOLUTION INTRAVENOUS; SUBCUTANEOUS at 11:55

## 2021-11-20 RX ADMIN — INSULIN GLARGINE 20 UNITS: 100 INJECTION, SOLUTION SUBCUTANEOUS at 21:16

## 2021-11-20 RX ADMIN — INSULIN LISPRO 2 UNITS: 100 INJECTION, SOLUTION INTRAVENOUS; SUBCUTANEOUS at 17:49

## 2021-11-20 RX ADMIN — MONTELUKAST 10 MG: 10 TABLET, FILM COATED ORAL at 21:08

## 2021-11-20 RX ADMIN — WATER 1 G: 1 INJECTION INTRAMUSCULAR; INTRAVENOUS; SUBCUTANEOUS at 15:24

## 2021-11-20 RX ADMIN — SODIUM CHLORIDE 75 ML/HR: 900 INJECTION, SOLUTION INTRAVENOUS at 08:26

## 2021-11-20 RX ADMIN — HEPARIN SODIUM 5000 UNITS: 5000 INJECTION INTRAVENOUS; SUBCUTANEOUS at 21:09

## 2021-11-20 RX ADMIN — Medication 10 ML: at 15:25

## 2021-11-20 RX ADMIN — ROPINIROLE HYDROCHLORIDE 1 MG: 1 TABLET, FILM COATED ORAL at 21:08

## 2021-11-20 RX ADMIN — HEPARIN SODIUM 5000 UNITS: 5000 INJECTION INTRAVENOUS; SUBCUTANEOUS at 15:24

## 2021-11-20 RX ADMIN — ROPINIROLE HYDROCHLORIDE 1 MG: 1 TABLET, FILM COATED ORAL at 02:27

## 2021-11-20 RX ADMIN — ROSUVASTATIN CALCIUM 10 MG: 10 TABLET, COATED ORAL at 21:08

## 2021-11-20 NOTE — PROGRESS NOTES
PT orders received and chart reviewed. Spoke w/ pt via telephone, who reports she is feeling better, getting to/from bed<>bathroom w/o difficulty and has returned to her functional baseline. Formal PT eval not warranted at this time.   If pt mobility declines please consider re-ordering PT eval.

## 2021-11-20 NOTE — PROGRESS NOTES
Problem: Risk for Spread of Infection  Goal: Prevent transmission of infectious organism to others  Description: Prevent the transmission of infectious organisms to other patients, staff members, and visitors. Outcome: Progressing Towards Goal     Problem: Falls - Risk of  Goal: *Absence of Falls  Description: Document Dionte Crane Fall Risk and appropriate interventions in the flowsheet.   Outcome: Progressing Towards Goal  Note: Fall Risk Interventions:  Mobility Interventions: Patient to call before getting OOB         Medication Interventions: Patient to call before getting OOB, Teach patient to arise slowly

## 2021-11-20 NOTE — PROGRESS NOTES
Problem: Risk for Spread of Infection  Goal: Prevent transmission of infectious organism to others  Description: Prevent the transmission of infectious organisms to other patients, staff members, and visitors. Outcome: Progressing Towards Goal     Problem: Diabetes Self-Management  Goal: *Disease process and treatment process  Description: Define diabetes and identify own type of diabetes; list 3 options for treating diabetes. Outcome: Progressing Towards Goal  Goal: *Incorporating nutritional management into lifestyle  Description: Describe effect of type, amount and timing of food on blood glucose; list 3 methods for planning meals. Outcome: Progressing Towards Goal  Goal: *Incorporating physical activity into lifestyle  Description: State effect of exercise on blood glucose levels. Outcome: Progressing Towards Goal  Goal: *Developing strategies to promote health/change behavior  Description: Define the ABC's of diabetes; identify appropriate screenings, schedule and personal plan for screenings. Outcome: Progressing Towards Goal  Goal: *Using medications safely  Description: State effect of diabetes medications on diabetes; name diabetes medication taking, action and side effects. Outcome: Progressing Towards Goal  Goal: *Monitoring blood glucose, interpreting and using results  Description: Identify recommended blood glucose targets  and personal targets. Outcome: Progressing Towards Goal  Goal: *Prevention, detection, treatment of acute complications  Description: List symptoms of hyper- and hypoglycemia; describe how to treat low blood sugar and actions for lowering  high blood glucose level. Outcome: Progressing Towards Goal  Goal: *Prevention, detection and treatment of chronic complications  Description: Define the natural course of diabetes and describe the relationship of blood glucose levels to long term complications of diabetes.   Outcome: Progressing Towards Goal  Goal: *Developing strategies to address psychosocial issues  Description: Describe feelings about living with diabetes; identify support needed and support network  Outcome: Progressing Towards Goal  Goal: *Insulin pump training  Outcome: Progressing Towards Goal  Goal: *Sick day guidelines  Outcome: Progressing Towards Goal  Goal: *Patient Specific Goal (EDIT GOAL, INSERT TEXT)  Outcome: Progressing Towards Goal

## 2021-11-20 NOTE — ED NOTES
Attempted to give report to receiving unit. Spoke with ariel. Was advised by Tonie Dick that receiving nurse/charge nurse was not able to take report at this time since getting report on another patient.

## 2021-11-20 NOTE — PROGRESS NOTES
Hospitalist Progress Note    Patient: Dajuan Huddleston MRN: [id number]  CSN: [id number]    YOB: 1945  Age: 68 y.o. Sex: female    DOA: 11/19/2021 LOS:  LOS: 1 day                Assessment/Plan     Patient Active Problem List   Diagnosis Code    Dehydration E86.0    Hyponatremia E87.1    Acute UTI N39.0    JOHNATHAN (acute kidney injury) ([facility] [address] N17.9    Diabetes ([facility] [address] E11.9    Hypertension I10        Chief complaint :  Dysuria  68 y.o. female with hypertension, diabetes, presented to ER due to dysuria, increasing frequency of urination. Urinary tract infection -  Failed outpatient treatment  on cefepime  Follow-up with Urine culture     Dehydration -  IV hydration  Monitor urine output     Acute renal failure -  prerenal  Mild left hydroureteronephrosis without evidence of obstructing lesion. Avoid IV contrast, IV hydration. Renal dose medication avoid NSAIDs     Hyponatremia -  NS infusion  Follow sodium levels     DM type II  -  on lantus,  ssi, diabetic diet , hypoglycemia protocol        HTN, accelerated -  Hold home losartan due to johnathan,  Monitor BP     Disposition : 1-2 days    Review of systems  General: No fevers or chills. Cardiovascular: No chest pain or pressure. No palpitations. Pulmonary: No shortness of breath. Gastrointestinal: No nausea, vomiting. Physical Exam:  General: Awake, cooperative, no acute distress    HEENT: NC, Atraumatic. PERRLA, anicteric sclerae. Lungs: CTA Bilaterally. No Wheezing/Rhonchi/Rales. Heart:  S1 S2,  No murmur, No Rubs, No Gallops  Abdomen: Soft, Non distended, Non tender.  +Bowel sounds,   Extremities: No c/c/e  Psych:   Not anxious or agitated. Neurologic:  No acute neurological deficit.            Vital signs/Intake and Output:  Visit Vitals  BP (!) 106/54 (BP 1 Location: Left upper arm, BP Patient Position: At rest)   Pulse (!) 104   Temp 97.7 °F (36.5 °C)   Resp 16   Ht 5' 6\" (1.676 m)   Wt 85.7 kg (189 lb)   SpO2 95%   BMI 30.51 kg/m²     Current Shift:  11/20 0701 - 11/20 1900  In: 240 [P.O.:240]  Out: -   Last three shifts:  No intake/output data recorded. Labs: Results:       Chemistry Recent Labs     11/20/21  0100 11/19/21  1101   * 196*   * 130*   K 4.0 4.1    95*   CO2 21 23   BUN 44* 51*   CREA 1.51* 2.11*   CA 10.1 10.7*   AGAP 12 12   BUCR 29* 24*   AP  --  89   TP  --  8.8*   ALB  --  3.7   GLOB  --  5.1*   AGRAT  --  0.7*      CBC w/Diff Recent Labs     11/20/21  0100 11/19/21  1101   WBC 11.7 15.4*   RBC 4.14* 4.61   HGB 11.5* 13.0   HCT 35.8 40.1   * 517*   GRANS 63 64   LYMPH 22 20*   EOS 1 1      Cardiac Enzymes No results for input(s): CPK, CKND1, KODAK in the last 72 hours. No lab exists for component: CKRMB, TROIP   Coagulation Recent Labs     11/19/21  1101   PTP 13.1   INR 1.0       Lipid Panel Lab Results   Component Value Date/Time    Cholesterol, total 118 04/03/2010 06:10 AM    HDL Cholesterol 20 (L) 04/03/2010 06:10 AM    LDL, calculated 73 04/03/2010 06:10 AM    VLDL, calculated 25 04/03/2010 06:10 AM    Triglyceride 125 04/03/2010 06:10 AM    CHOL/HDL Ratio 5.9 (H) 04/03/2010 06:10 AM      BNP No results for input(s): BNPP in the last 72 hours.    Liver Enzymes Recent Labs     11/19/21  1101   TP 8.8*   ALB 3.7   AP 89      Thyroid Studies Lab Results   Component Value Date/Time    TSH 0.83 04/17/2010 06:10 PM        Procedures/imaging: see electronic medical records for all procedures/Xrays and details which were not copied into this note but were reviewed prior to creation of Plan

## 2021-11-20 NOTE — ROUTINE PROCESS
TRANSFER - IN REPORT:    Verbal report received from JAVIER Donato(name) on [employer]  being received from ED(unit) for routine progression of care      Report consisted of patients Situation, Background, Assessment and   Recommendations(SBAR). Information from the following report(s) SBAR, Kardex, Intake/Output, MAR and Recent Results was reviewed with the receiving nurse. Opportunity for questions and clarification was provided. Assessment completed upon patients arrival to unit and care assumed. Shift summary  Pt complained of pain in the bladder region, tylenol given. Bedside and verbal report given by (off going nurse) NAFISA Ndiaye RN to (oncoming nurse) ***. Report included the following information SBAR, Kardex, OR Summary, Intake/Output, and MAR.

## 2021-11-20 NOTE — PROGRESS NOTES
Care Management        Reason for Admission: UTI failure of outpatient treatment    Chart reviewed. Per H&P: Linnea Cedeno is a 68 y.o. female with hypertension, diabetes, presented to ER due to dysuria, increasing frequency of urination. She has been treated as urinary tract infection per [facility] urgent care for  9 days ago. She completed p.o. antibiotics cefdinir from record. Symptoms does not improving she presented nausea or vomiting with diarrhea for 2-3 days due to abx, but resolved now. UA indicated urinary tract infection, CT abdomen cystitis and mild  hydronephrosis without obstruction. She presented with a leukocytosis, creatinine 2.1 sodium 130. Received covid 19 vaccine and no coivd 19 exposure. Denies any slurred speech/headache/cp/n/v/blurred vission/d/c/palpitation/gait change/bleeding. Denies smoking/ any alcohol or drug use. Prior to admission patient was living: with spouse      Prior to admission patient was using the following DME: cane                       RUR Score: 9%                     Plan for utilizing home health: TBD           PCP: First and Last name: Donna Aguillon MD    Name of Practice:    Are you a current patient: Yes/No:    Approximate date of last visit:    Can you participate in a virtual visit with your PCP:                     Current Advanced Directive/Advance Care Plan: Full Code    Healthcare Decision Maker: spouse Yariel Stephon, [phone]  Click here to complete [employer] including selection of the Healthcare Decision Maker Relationship (ie \"Primary\")                         Transition of Care Plan: In progress       Care Management/Patient Conversation: CM spoke with patient at bedside. Patient confirmed contact info, PCP whom she saw yesterday (PCP told her to go to [facility] ED) and primary contact, spouse Yariel Szymanski, [phone].  Patient reports she uses a cane at home, drives herself and will have a ride available at discharge. No CM needs identified at this time. CM to continue to follow and remain available for any discharge planning needs as appropriate. Care Management Interventions  PCP Verified by CM:  Yes  Last Visit to PCP: 11/19/21  Mode of Transport at Discharge:  (family)  Transition of Care Consult (CM Consult): Discharge Planning  Support Systems: Spouse/Significant Other  Confirm Follow Up Transport: Family  The Plan for Transition of Care is Related to the Following Treatment Goals : home with family assistance and physician follow up  Discharge Location  Discharge Placement:  (homw with physician follow up and family assistance)

## 2021-11-20 NOTE — ED NOTES
Attempted to call report to floor. Rapid response was called for another pt on the floor and nurses were unable to take report at this time.

## 2021-11-20 NOTE — ED NOTES
Attempted to call report to Onel Oconnor again. Per Soraya Quintanilla, no one is able to take report d/t to nurse being in with rapid response.

## 2021-11-20 NOTE — PROGRESS NOTES
Received OT eval and treat orders. Screened patient to identify level of assistance needed for ADLs and functional mobility/transfers. Patient presents to be at baseline and is independent in performing ADLs and functional mobility/transfers. Pt is not appropriate for skilled OT interventions at this time. Current OT orders weill be discontinued.     Thank you for this referral.    Vinnie Stone, OTR/L

## 2021-11-21 LAB
ANION GAP SERPL CALC-SCNC: 9 MMOL/L (ref 3–18)
BASOPHILS # BLD: 0 K/UL (ref 0–0.1)
BASOPHILS NFR BLD: 1 % (ref 0–2)
BUN SERPL-MCNC: 23 MG/DL (ref 7–18)
BUN/CREAT SERPL: 27 (ref 12–20)
CALCIUM SERPL-MCNC: 9.5 MG/DL (ref 8.5–10.1)
CHLORIDE SERPL-SCNC: 106 MMOL/L (ref 100–111)
CO2 SERPL-SCNC: 21 MMOL/L (ref 21–32)
CREAT SERPL-MCNC: 0.84 MG/DL (ref 0.6–1.3)
DIFFERENTIAL METHOD BLD: ABNORMAL
EOSINOPHIL # BLD: 0.1 K/UL (ref 0–0.4)
EOSINOPHIL NFR BLD: 1 % (ref 0–5)
ERYTHROCYTE [DISTWIDTH] IN BLOOD BY AUTOMATED COUNT: 13.7 % (ref 11.6–14.5)
GLUCOSE BLD STRIP.AUTO-MCNC: 122 MG/DL (ref 70–110)
GLUCOSE BLD STRIP.AUTO-MCNC: 215 MG/DL (ref 70–110)
GLUCOSE BLD STRIP.AUTO-MCNC: 220 MG/DL (ref 70–110)
GLUCOSE BLD STRIP.AUTO-MCNC: 94 MG/DL (ref 70–110)
GLUCOSE SERPL-MCNC: 114 MG/DL (ref 74–99)
HCT VFR BLD AUTO: 32.8 % (ref 35–45)
HGB BLD-MCNC: 10.5 G/DL (ref 12–16)
IMM GRANULOCYTES # BLD AUTO: 0 K/UL (ref 0–0.04)
IMM GRANULOCYTES NFR BLD AUTO: 1 % (ref 0–0.5)
LYMPHOCYTES # BLD: 1.8 K/UL (ref 0.9–3.6)
LYMPHOCYTES NFR BLD: 22 % (ref 21–52)
MAGNESIUM SERPL-MCNC: 1.6 MG/DL (ref 1.6–2.6)
MCH RBC QN AUTO: 28.5 PG (ref 24–34)
MCHC RBC AUTO-ENTMCNC: 32 G/DL (ref 31–37)
MCV RBC AUTO: 88.9 FL (ref 78–100)
MONOCYTES # BLD: 1.2 K/UL (ref 0.05–1.2)
MONOCYTES NFR BLD: 14 % (ref 3–10)
NEUTS SEG # BLD: 5.2 K/UL (ref 1.8–8)
NEUTS SEG NFR BLD: 62 % (ref 40–73)
NRBC # BLD: 0 K/UL (ref 0–0.01)
NRBC BLD-RTO: 0 PER 100 WBC
PLATELET # BLD AUTO: 385 K/UL (ref 135–420)
PMV BLD AUTO: 10.5 FL (ref 9.2–11.8)
POTASSIUM SERPL-SCNC: 4.3 MMOL/L (ref 3.5–5.5)
RBC # BLD AUTO: 3.69 M/UL (ref 4.2–5.3)
SODIUM SERPL-SCNC: 136 MMOL/L (ref 136–145)
WBC # BLD AUTO: 8.3 K/UL (ref 4.6–13.2)

## 2021-11-21 PROCEDURE — 80048 BASIC METABOLIC PNL TOTAL CA: CPT

## 2021-11-21 PROCEDURE — 74011250636 HC RX REV CODE- 250/636: Performed by: EMERGENCY MEDICINE

## 2021-11-21 PROCEDURE — 74011000250 HC RX REV CODE- 250: Performed by: EMERGENCY MEDICINE

## 2021-11-21 PROCEDURE — 74011636637 HC RX REV CODE- 636/637: Performed by: HOSPITALIST

## 2021-11-21 PROCEDURE — 74011250637 HC RX REV CODE- 250/637: Performed by: HOSPITALIST

## 2021-11-21 PROCEDURE — 83735 ASSAY OF MAGNESIUM: CPT

## 2021-11-21 PROCEDURE — 74011250636 HC RX REV CODE- 250/636: Performed by: HOSPITALIST

## 2021-11-21 PROCEDURE — 82962 GLUCOSE BLOOD TEST: CPT

## 2021-11-21 PROCEDURE — 36415 COLL VENOUS BLD VENIPUNCTURE: CPT

## 2021-11-21 PROCEDURE — 65270000029 HC RM PRIVATE

## 2021-11-21 PROCEDURE — 74011250637 HC RX REV CODE- 250/637: Performed by: INTERNAL MEDICINE

## 2021-11-21 PROCEDURE — 85025 COMPLETE CBC W/AUTO DIFF WBC: CPT

## 2021-11-21 RX ORDER — INSULIN LISPRO 100 [IU]/ML
INJECTION, SOLUTION INTRAVENOUS; SUBCUTANEOUS
Status: DISCONTINUED | OUTPATIENT
Start: 2021-11-21 | End: 2021-11-21

## 2021-11-21 RX ORDER — INSULIN LISPRO 100 [IU]/ML
INJECTION, SOLUTION INTRAVENOUS; SUBCUTANEOUS
Status: DISCONTINUED | OUTPATIENT
Start: 2021-11-21 | End: 2021-11-22 | Stop reason: HOSPADM

## 2021-11-21 RX ADMIN — SODIUM CHLORIDE 75 ML/HR: 900 INJECTION, SOLUTION INTRAVENOUS at 20:00

## 2021-11-21 RX ADMIN — HEPARIN SODIUM 5000 UNITS: 5000 INJECTION INTRAVENOUS; SUBCUTANEOUS at 21:29

## 2021-11-21 RX ADMIN — MONTELUKAST 10 MG: 10 TABLET, FILM COATED ORAL at 21:29

## 2021-11-21 RX ADMIN — INSULIN LISPRO 6 UNITS: 100 INJECTION, SOLUTION INTRAVENOUS; SUBCUTANEOUS at 18:22

## 2021-11-21 RX ADMIN — ROPINIROLE HYDROCHLORIDE 1 MG: 1 TABLET, FILM COATED ORAL at 21:29

## 2021-11-21 RX ADMIN — HEPARIN SODIUM 5000 UNITS: 5000 INJECTION INTRAVENOUS; SUBCUTANEOUS at 15:06

## 2021-11-21 RX ADMIN — WATER 1 G: 1 INJECTION INTRAMUSCULAR; INTRAVENOUS; SUBCUTANEOUS at 15:06

## 2021-11-21 RX ADMIN — Medication 10 ML: at 05:12

## 2021-11-21 RX ADMIN — ACETAMINOPHEN 650 MG: 325 TABLET ORAL at 21:29

## 2021-11-21 RX ADMIN — ACETAMINOPHEN 650 MG: 325 TABLET ORAL at 15:14

## 2021-11-21 RX ADMIN — PROMETHAZINE HYDROCHLORIDE 12.5 MG: 25 TABLET ORAL at 11:56

## 2021-11-21 RX ADMIN — INSULIN GLARGINE 20 UNITS: 100 INJECTION, SOLUTION SUBCUTANEOUS at 21:30

## 2021-11-21 RX ADMIN — HEPARIN SODIUM 5000 UNITS: 5000 INJECTION INTRAVENOUS; SUBCUTANEOUS at 05:11

## 2021-11-21 RX ADMIN — ROSUVASTATIN CALCIUM 10 MG: 10 TABLET, COATED ORAL at 21:29

## 2021-11-21 RX ADMIN — INSULIN LISPRO 4 UNITS: 100 INJECTION, SOLUTION INTRAVENOUS; SUBCUTANEOUS at 11:56

## 2021-11-21 NOTE — ROUTINE PROCESS
Bedside and Verbal shift change report given to VENANCIO Cope RN (oncoming nurse) by [employer] RN (offgoing nurse). Report included the following information SBAR, Kardex, Intake/Output and MAR. Kamlesh Andrade

## 2021-11-21 NOTE — PROGRESS NOTES
0905  Pt using bathroom at this time nurse will return for initial assessment. 1100  Assumed care of pt at this time. Assessment complete. Pt alert and oriented x 4. Shows no sign of distress. Fall risk arm band in place. Denies SOB and chest pain. Pt lungs clear bilaterally. Cap refill  less than 3 seconds. Pt denies numbness and tingling to all extremities. Stated pain 2/10. Pt has 20 G IV to L ac. Pt has no dressing skin intact no edema . On heparin for VTE  Incentive spirometer at bedside. Pt encouraged to continue use of IS. Pt verbalized understanding. Call light and possessions within reach. Bed locked and in low position. Will continue to monitor.

## 2021-11-21 NOTE — PROGRESS NOTES
2107-alert and oriented x 4. Lungs CTA on RA. BS active x 4 quads. IV access to left AC infusing NS at 75 ml/hr without difficulty. Patient ambulates in room with steady gait. Pain rated at 3/10, denies need for pain medication. Shift summary-up independently through the noc. Minimal c/o pain or discomfort to abdomen during this shift, declined pain medication.

## 2021-11-21 NOTE — PROGRESS NOTES
Hospitalist Progress Note    Patient: Corbett Kussmaul MRN: [id number]  CSN: [id number]    YOB: 1945  Age: 68 y.o. Sex: female    DOA: 11/19/2021 LOS:  LOS: 2 days                Assessment/Plan     Patient Active Problem List   Diagnosis Code    Dehydration E86.0    Hyponatremia E87.1    Acute UTI N39.0    JOHNATHAN (acute kidney injury) ([facility] [address] N17.9    Diabetes ([facility] [address] E11.9    Hypertension I10        Chief complaint :  Dysuria  68 y.o. female with hypertension, diabetes, presented to ER due to dysuria, increasing frequency of urination. Urinary tract infection -  on cefepime  Mixed dc on Urine culture     Dehydration -  IV hydration  Monitor urine output     Acute renal failure -  Prerenal, resolved  Mild left hydroureteronephrosis without evidence of obstructing lesion. Avoid IV contrast, IV hydration. Renal dose medication avoid NSAIDs     Hyponatremia -  resolved  NS infusion       DM type II  -  on lantus,  ssi, diabetic diet , hypoglycemia protocol        HTN, accelerated -  Hold home losartan due to johnathan,  BP on lower side, may not need BP meds at discharge. Disposition : 1-2 days    Review of systems  General: No fevers or chills. Cardiovascular: No chest pain or pressure. No palpitations. Pulmonary: No shortness of breath. Gastrointestinal: No nausea, vomiting. Physical Exam:  General: Awake, cooperative, no acute distress    HEENT: NC, Atraumatic. PERRLA, anicteric sclerae. Lungs: CTA Bilaterally. No Wheezing/Rhonchi/Rales. Heart:  S1 S2,  No murmur, No Rubs, No Gallops  Abdomen: Soft, Non distended, Non tender.  +Bowel sounds,   Extremities: No c/c/e  Psych:   Not anxious or agitated. Neurologic:  No acute neurological deficit.            Vital signs/Intake and Output:  Visit Vitals  BP (!) 101/57   Pulse 91   Temp 98.5 °F (36.9 °C)   Resp 16   Ht 5' 6\" (1.676 m)   Wt 85.7 kg (189 lb)   SpO2 100%   BMI 30.51 kg/m²     Current Shift:  No intake/output data recorded. Last three shifts:  11/19 1901 - 11/21 0700  In: 240 [P.O.:240]  Out: -             Labs: Results:       Chemistry Recent Labs     11/21/21  0704 11/20/21  0100 11/19/21  1101   * 191* 196*    133* 130*   K 4.3 4.0 4.1    100 95*   CO2 21 21 23   BUN 23* 44* 51*   CREA 0.84 1.51* 2.11*   CA 9.5 10.1 10.7*   AGAP 9 12 12   BUCR 27* 29* 24*   AP  --   --  89   TP  --   --  8.8*   ALB  --   --  3.7   GLOB  --   --  5.1*   AGRAT  --   --  0.7*      CBC w/Diff Recent Labs     11/21/21  0704 11/20/21  0100 11/19/21  1101   WBC 8.3 11.7 15.4*   RBC 3.69* 4.14* 4.61   HGB 10.5* 11.5* 13.0   HCT 32.8* 35.8 40.1    489* 517*   GRANS 62 63 64   LYMPH 22 22 20*   EOS 1 1 1      Cardiac Enzymes No results for input(s): CPK, CKND1, KODAK in the last 72 hours. No lab exists for component: CKRMB, TROIP   Coagulation Recent Labs     11/19/21  1101   PTP 13.1   INR 1.0       Lipid Panel Lab Results   Component Value Date/Time    Cholesterol, total 118 04/03/2010 06:10 AM    HDL Cholesterol 20 (L) 04/03/2010 06:10 AM    LDL, calculated 73 04/03/2010 06:10 AM    VLDL, calculated 25 04/03/2010 06:10 AM    Triglyceride 125 04/03/2010 06:10 AM    CHOL/HDL Ratio 5.9 (H) 04/03/2010 06:10 AM      BNP No results for input(s): BNPP in the last 72 hours.    Liver Enzymes Recent Labs     11/19/21  1101   TP 8.8*   ALB 3.7   AP 89      Thyroid Studies Lab Results   Component Value Date/Time    TSH 0.83 04/17/2010 06:10 PM        Procedures/imaging: see electronic medical records for all procedures/Xrays and details which were not copied into this note but were reviewed prior to creation of Plan

## 2021-11-21 NOTE — ROUTINE PROCESS
Bedside and Verbal shift change report given to KANCHAN Nair RN (oncoming nurse) by FRAN Brand RN (offgoing nurse). Report included the following information SBAR, Kardex, Intake/Output and MAR.

## 2021-11-22 VITALS
TEMPERATURE: 98.5 F | OXYGEN SATURATION: 100 % | BODY MASS INDEX: 30.37 KG/M2 | WEIGHT: 189 LBS | RESPIRATION RATE: 16 BRPM | HEART RATE: 91 BPM | DIASTOLIC BLOOD PRESSURE: 65 MMHG | HEIGHT: 66 IN | SYSTOLIC BLOOD PRESSURE: 123 MMHG

## 2021-11-22 LAB
ANION GAP SERPL CALC-SCNC: 8 MMOL/L (ref 3–18)
BASOPHILS # BLD: 0 K/UL (ref 0–0.1)
BASOPHILS NFR BLD: 1 % (ref 0–2)
BUN SERPL-MCNC: 15 MG/DL (ref 7–18)
BUN/CREAT SERPL: 19 (ref 12–20)
CALCIUM SERPL-MCNC: 9.2 MG/DL (ref 8.5–10.1)
CHLORIDE SERPL-SCNC: 108 MMOL/L (ref 100–111)
CO2 SERPL-SCNC: 24 MMOL/L (ref 21–32)
CREAT SERPL-MCNC: 0.79 MG/DL (ref 0.6–1.3)
DIFFERENTIAL METHOD BLD: ABNORMAL
EOSINOPHIL # BLD: 0.2 K/UL (ref 0–0.4)
EOSINOPHIL NFR BLD: 2 % (ref 0–5)
ERYTHROCYTE [DISTWIDTH] IN BLOOD BY AUTOMATED COUNT: 13.7 % (ref 11.6–14.5)
GLUCOSE BLD STRIP.AUTO-MCNC: 123 MG/DL (ref 70–110)
GLUCOSE BLD STRIP.AUTO-MCNC: 68 MG/DL (ref 70–110)
GLUCOSE BLD STRIP.AUTO-MCNC: 78 MG/DL (ref 70–110)
GLUCOSE SERPL-MCNC: 86 MG/DL (ref 74–99)
HCT VFR BLD AUTO: 32.1 % (ref 35–45)
HGB BLD-MCNC: 10.1 G/DL (ref 12–16)
IMM GRANULOCYTES # BLD AUTO: 0 K/UL (ref 0–0.04)
IMM GRANULOCYTES NFR BLD AUTO: 1 % (ref 0–0.5)
LYMPHOCYTES # BLD: 2.3 K/UL (ref 0.9–3.6)
LYMPHOCYTES NFR BLD: 26 % (ref 21–52)
MAGNESIUM SERPL-MCNC: 1.5 MG/DL (ref 1.6–2.6)
MCH RBC QN AUTO: 28.1 PG (ref 24–34)
MCHC RBC AUTO-ENTMCNC: 31.5 G/DL (ref 31–37)
MCV RBC AUTO: 89.2 FL (ref 78–100)
MONOCYTES # BLD: 1.4 K/UL (ref 0.05–1.2)
MONOCYTES NFR BLD: 16 % (ref 3–10)
NEUTS SEG # BLD: 4.9 K/UL (ref 1.8–8)
NEUTS SEG NFR BLD: 55 % (ref 40–73)
NRBC # BLD: 0 K/UL (ref 0–0.01)
NRBC BLD-RTO: 0 PER 100 WBC
PLATELET # BLD AUTO: 380 K/UL (ref 135–420)
PMV BLD AUTO: 10.4 FL (ref 9.2–11.8)
POTASSIUM SERPL-SCNC: 4.2 MMOL/L (ref 3.5–5.5)
RBC # BLD AUTO: 3.6 M/UL (ref 4.2–5.3)
SODIUM SERPL-SCNC: 140 MMOL/L (ref 136–145)
WBC # BLD AUTO: 8.8 K/UL (ref 4.6–13.2)

## 2021-11-22 PROCEDURE — 36415 COLL VENOUS BLD VENIPUNCTURE: CPT

## 2021-11-22 PROCEDURE — 74011250636 HC RX REV CODE- 250/636: Performed by: HOSPITALIST

## 2021-11-22 PROCEDURE — 83735 ASSAY OF MAGNESIUM: CPT

## 2021-11-22 PROCEDURE — 74011250637 HC RX REV CODE- 250/637: Performed by: HOSPITALIST

## 2021-11-22 PROCEDURE — 85025 COMPLETE CBC W/AUTO DIFF WBC: CPT

## 2021-11-22 PROCEDURE — 80048 BASIC METABOLIC PNL TOTAL CA: CPT

## 2021-11-22 PROCEDURE — 82962 GLUCOSE BLOOD TEST: CPT

## 2021-11-22 PROCEDURE — 74011000258 HC RX REV CODE- 258: Performed by: HOSPITALIST

## 2021-11-22 RX ORDER — NITROFURANTOIN 25; 75 MG/1; MG/1
100 CAPSULE ORAL 2 TIMES DAILY
Qty: 10 CAPSULE | Refills: 0 | Status: SHIPPED | OUTPATIENT
Start: 2021-11-22 | End: 2021-11-27

## 2021-11-22 RX ORDER — MAGNESIUM SULFATE HEPTAHYDRATE 40 MG/ML
2 INJECTION, SOLUTION INTRAVENOUS ONCE
Status: COMPLETED | OUTPATIENT
Start: 2021-11-22 | End: 2021-11-22

## 2021-11-22 RX ADMIN — HEPARIN SODIUM 5000 UNITS: 5000 INJECTION INTRAVENOUS; SUBCUTANEOUS at 06:20

## 2021-11-22 RX ADMIN — MAGNESIUM SULFATE HEPTAHYDRATE 2 G: 500 INJECTION, SOLUTION INTRAMUSCULAR; INTRAVENOUS at 12:00

## 2021-11-22 RX ADMIN — Medication 10 ML: at 14:00

## 2021-11-22 RX ADMIN — SODIUM CHLORIDE 75 ML/HR: 900 INJECTION, SOLUTION INTRAVENOUS at 08:57

## 2021-11-22 RX ADMIN — ACETAMINOPHEN 650 MG: 325 TABLET ORAL at 04:32

## 2021-11-22 RX ADMIN — HEPARIN SODIUM 5000 UNITS: 5000 INJECTION INTRAVENOUS; SUBCUTANEOUS at 13:27

## 2021-11-22 NOTE — ROUTINE PROCESS
Bedside and Verbal shift change report given to [id number] [place] (oncoming nurse) by Court Arriola RN (offgoing nurse). Report included the following information SBAR, Kardex, MAR and Recent Results.

## 2021-11-22 NOTE — PROGRESS NOTES
1920 - Assumed care at this time. 1954 - Assessment completed. Pt A&Ox4, RA. Lung sounds clear. Denies chest pain/SOB. Pt denies n/v. Pain rated 2/10. Pt c/o intermittent tingling to BLE present prior to admission. No concerns voiced. Telephone and call bell within reach. Pt encouraged to call for assistance.

## 2021-11-22 NOTE — DISCHARGE SUMMARY
Discharge Summary    Patient: Jameson Laurent MRN: [id number]  CSN: [id number]    YOB: 1945  Age: 68 y.o. Sex: female    DOA: 11/19/2021 LOS:  LOS: 3 days   Discharge Date: 11/22/2021     Primary Care Provider:  Lashay Edwards MD    Admission Diagnoses: JOHNATHAN (acute kidney injury) ([facility] [N17.9]  Acute UTI [N39.0]  Hyponatremia [E87.1]  Dehydration [E86.0]    Discharge Diagnoses:    Problem List as of 11/22/2021 Never Reviewed          Codes Class Noted - Resolved    Dehydration ICD-10-CM: E86.0  ICD-9-CM: 276.51  11/19/2021 - Present        Hyponatremia ICD-10-CM: E87.1  ICD-9-CM: 276.1  11/19/2021 - Present        * (Principal) Acute UTI ICD-10-CM: N39.0  ICD-9-CM: 599.0  11/19/2021 - Present        JOHNATHAN (acute kidney injury) ([facility] ICD-10-CM: N17.9  ICD-9-CM: 584.9  11/19/2021 - Present        Diabetes ([facility] ICD-10-CM: E11.9  ICD-9-CM: 250.00  Unknown - Present        Hypertension ICD-10-CM: I10  ICD-9-CM: 401.9  Unknown - Present              Discharge Medications:     Discharge Medication List as of 11/22/2021  1:56 PM      START taking these medications    Details   nitrofurantoin, macrocrystal-monohydrate, (Macrobid) 100 mg capsule Take 1 Capsule by mouth two (2) times a day for 5 days. , Normal, Disp-10 Capsule, R-0         CONTINUE these medications which have NOT CHANGED    Details   insulin aspart protamine/insulin aspart (NovoLOG Mix 70-30FlexPen U-100) 100 unit/mL (70-30) inpn by SubCUTAneous route., Historical Med      exenatide (BYETTA) 5 mcg/dose (250 mcg/mL) 1.2 mL injection 5 mcg by SubCUTAneous route every seven (7) days. , Historical Med      SITagliptin (Januvia) 25 mg tablet Take 25 mg by mouth daily. , Historical Med      rosuvastatin (Crestor) 10 mg tablet Take 10 mg by mouth nightly., Historical Med      montelukast (SINGULAIR) 10 mg tablet Take 10 mg by mouth daily. , Historical Med      eye lubricant combination no.1 2-0.9-1.8 % drop Apply  to eye., Historical Med      rOPINIRole (Requip) 1 mg tablet Take 1 mg by mouth daily. nightly   Indications: restless legs syndrome, an extreme discomfort in the calf muscles when sitting or lying down, Historical Med         STOP taking these medications       lisinopril-hydroCHLOROthiazide (PRINZIDE, ZESTORETIC) 20-12.5 mg per tablet Comments:   Reason for Stopping:               Discharge Condition: Good    Procedures : None    Consults: None      PHYSICAL EXAM   Visit Vitals  /65 (BP 1 Location: Right upper arm, BP Patient Position: At rest;Sitting)   Pulse 91   Temp 98.5 °F (36.9 °C)   Resp 16   Ht 5' 6\" (1.676 m)   Wt 85.7 kg (189 lb)   SpO2 100%   BMI 30.51 kg/m²     General: Awake, cooperative, no acute distress    HEENT: NC, Atraumatic. PERRLA, EOMI. Anicteric sclerae. Lungs:  CTA Bilaterally. No Wheezing/Rhonchi/Rales. Heart:  Regular  rhythm,  No murmur, No Rubs, No Gallops  Abdomen: Soft, Non distended, Non tender. +Bowel sounds,   Extremities: No c/c/e  Psych:   Not anxious or agitated. Neurologic:  No acute neurological deficits. Admission HPI :   Brigitte Gutiérrez is a 68 y.o. female with hypertension, diabetes, presented to ER due to dysuria, increasing frequency of urination. She has been treated as urinary tract infection per [facility] urgent care for  9 days ago. She completed p.o. antibiotics cefdinir from record. Symptoms does not improving she presented nausea or vomiting with diarrhea for 2-3 days due to abx, but resolved now. UA indicated urinary tract infection, CT abdomen cystitis and mild  hydronephrosis without obstruction. She presented with a leukocytosis, creatinine 2.1 sodium 130. Received covid 19 vaccine and no coivd 19 exposure. Denies any slurred speech/headache/cp/n/v/blurred vission/d/c/palpitation/gait change/bleeding.  Denies smoking/ any alcohol or drug use.        Hospital Course :   Ms. Mark Beck was admitted to medical floor, she was started on cefepime. She did not had any acute events during hospitalization. UTI -  Started on cefepime  Urine cultures with mixed dc  Will discharge on macrobid    Urinary retention -  Her CT abdomen pelvis with mild hydronephrosis without obstruction. No pain, her renal function in normal range. Follow up with urology. JOHNATHAN -  Resolved with IVF    Hyponatremia -  Resolved, stopped HCTZ    HTN -  Her BP in normal range. Will stop her home BP medications    DM -  Was placed on SSI, ADA diet  Resume home meds at discharge. Activity: Activity as tolerated    Diet: Diabetic Diet    Follow-up: PCP, urology    Disposition: home    Minutes spent on discharge: 45       Labs: Results:       Chemistry Recent Labs     11/22/21 0450 11/21/21  0704 11/20/21  0100   GLU 86 114* 191*    136 133*   K 4.2 4.3 4.0    106 100   CO2 24 21 21   BUN 15 23* 44*   CREA 0.79 0.84 1.51*   CA 9.2 9.5 10.1   AGAP 8 9 12   BUCR 19 27* 29*      CBC w/Diff Recent Labs     11/22/21 0450 11/21/21  0704 11/20/21  0100   WBC 8.8 8.3 11.7   RBC 3.60* 3.69* 4.14*   HGB 10.1* 10.5* 11.5*   HCT 32.1* 32.8* 35.8    385 489*   GRANS 55 62 63   LYMPH 26 22 22   EOS 2 1 1      Cardiac Enzymes No results for input(s): CPK, CKND1, KODAK in the last 72 hours. No lab exists for component: CKRMB, TROIP   Coagulation No results for input(s): PTP, INR, APTT, INREXT in the last 72 hours. Lipid Panel Lab Results   Component Value Date/Time    Cholesterol, total 118 04/03/2010 06:10 AM    HDL Cholesterol 20 (L) 04/03/2010 06:10 AM    LDL, calculated 73 04/03/2010 06:10 AM    VLDL, calculated 25 04/03/2010 06:10 AM    Triglyceride 125 04/03/2010 06:10 AM    CHOL/HDL Ratio 5.9 (H) 04/03/2010 06:10 AM      BNP No results for input(s): BNPP in the last 72 hours. Liver Enzymes No results for input(s): TP, ALB, TBIL, AP in the last 72 hours.     No lab exists for component: SGOT, GPT, DBIL   Thyroid Studies Lab Results   Component Value Date/Time    TSH 0.83 04/17/2010 06:10 PM            Significant Diagnostic Studies: CT ABD PELV WO CONT    Result Date: 11/19/2021  EXAM: CT of the abdomen and pelvis INDICATION: Pain. COMPARISON: None. TECHNIQUE: Axial CT imaging of the abdomen and pelvis was performed without intravenous contrast. Multiplanar reformats were generated. One or more dose reduction techniques were used on this CT: automated exposure control, adjustment of the mAs and/or kVp according to patient size, and iterative reconstruction techniques. The specific techniques used on this CT exam have been documented in the patient's electronic medical record. Digital Imaging and Communications in Medicine (DICOM) format image data are available to nonaffiliated external healthcare facilities or entities on a secure, media free, reciprocally searchable basis with patient authorization for at least a 12-month period after this study. _______________ FINDINGS: LOWER CHEST: Unremarkable. LIVER, BILIARY: Liver is normal. No biliary dilation. Cholecystectomy. PANCREAS: Normal. SPLEEN: Normal. ADRENALS: Normal. KIDNEYS/URETERS/BLADDER: Mild left hydroureteronephrosis without evidence of obstructing lesion. No renal calcification. Marked urinary bladder distention with mural thickening and perivesicular stranding. PELVIC ORGANS: Unremarkable. VASCULATURE: Vascular calcifications. LYMPH NODES: No enlarged lymph nodes. GASTROINTESTINAL TRACT: No bowel dilation or wall thickening. Small hiatal hernia. BONES: No acute or aggressive osseous abnormalities identified. OTHER: None. _______________     Marked urinary bladder distention with mural thickening and perivesicular stranding, likely reflecting cystitis. Correlate with urinalysis. Mild left hydroureteronephrosis without evidence of obstructing lesion, likely sequela of urinary retention and cystitis. No results found for this or any previous visit.        Please note that this dictation was completed with Dragon, the computer voice recognition software. Quite often unanticipated grammatical, syntax, homophones, and other interpretive errors are inadvertently transcribed by the computer software. Please disregard these errors. Please excuse any errors that have escaped final proofreading.      CC: Primitivo Johnson MD

## 2021-11-22 NOTE — DISCHARGE INSTRUCTIONS
DISCHARGE SUMMARY from Nurse    PATIENT INSTRUCTIONS:    After general anesthesia or intravenous sedation, for 24 hours or while taking prescription Narcotics:  · Limit your activities  · Do not drive and operate hazardous machinery  · Do not make important personal or business decisions  · Do  not drink alcoholic beverages  · If you have not urinated within 8 hours after discharge, please contact your surgeon on call. What to do at Home:  Recommended activity: Activity as tolerated,     If you experience any of the following symptoms burning upon urination, abdominal pain or lower back pain, fever above 100.5  Patient Education        High Blood Pressure: Care Instructions  Overview     It's normal for blood pressure to go up and down throughout the day. But if it stays up, you have high blood pressure. Another name for high blood pressure is hypertension. Despite what a lot of people think, high blood pressure usually doesn't cause headaches or make you feel dizzy or lightheaded. It usually has no symptoms. But it does increase your risk of stroke, heart attack, and other problems. You and your doctor will talk about your risks of these problems based on your blood pressure. Your doctor will give you a goal for your blood pressure. Your goal will be based on your health and your age. Lifestyle changes, such as eating healthy and being active, are always important to help lower blood pressure. You might also take medicine to reach your blood pressure goal.  Follow-up care is a key part of your treatment and safety. Be sure to make and go to all appointments, and call your doctor if you are having problems. It's also a good idea to know your test results and keep a list of the medicines you take. How can you care for yourself at home? Medical treatment  · If you stop taking your medicine, your blood pressure will go back up. You may take one or more types of medicine to lower your blood pressure.  Be safe with medicines. Take your medicine exactly as prescribed. Call your doctor if you think you are having a problem with your medicine. · Talk to your doctor before you start taking aspirin every day. Aspirin can help certain people lower their risk of a heart attack or stroke. But taking aspirin isn't right for everyone, because it can cause serious bleeding. · See your doctor regularly. You may need to see the doctor more often at first or until your blood pressure comes down. · If you are taking blood pressure medicine, talk to your doctor before you take decongestants or anti-inflammatory medicine, such as ibuprofen. Some of these medicines can raise blood pressure. · Learn how to check your blood pressure at home. Lifestyle changes  · Stay at a healthy weight. This is especially important if you put on weight around the waist. Losing even 10 pounds can help you lower your blood pressure. · If your doctor recommends it, get more exercise. Walking is a good choice. Bit by bit, increase the amount you walk every day. Try for at least 30 minutes on most days of the week. You also may want to swim, bike, or do other activities. · Avoid or limit alcohol. Talk to your doctor about whether you can drink any alcohol. · Try to limit how much sodium you eat to less than 2,300 milligrams (mg) a day. Your doctor may ask you to try to eat less than 1,500 mg a day. · Eat plenty of fruits (such as bananas and oranges), vegetables, legumes, whole grains, and low-fat dairy products. · Lower the amount of saturated fat in your diet. Saturated fat is found in animal products such as milk, cheese, and meat. Limiting these foods may help you lose weight and also lower your risk for heart disease. · Do not smoke. Smoking increases your risk for heart attack and stroke. If you need help quitting, talk to your doctor about stop-smoking programs and medicines. These can increase your chances of quitting for good.   When should you call for help? Call 911  anytime you think you may need emergency care. This may mean having symptoms that suggest that your blood pressure is causing a serious heart or blood vessel problem. Your blood pressure may be over 180/120. For example, call 911 if:    · You have symptoms of a heart attack. These may include:  ? Chest pain or pressure, or a strange feeling in the chest.  ? Sweating. ? Shortness of breath. ? Nausea or vomiting. ? Pain, pressure, or a strange feeling in the back, neck, jaw, or upper belly or in one or both shoulders or arms. ? Lightheadedness or sudden weakness. ? A fast or irregular heartbeat.     · You have symptoms of a stroke. These may include:  ? Sudden numbness, tingling, weakness, or loss of movement in your face, arm, or leg, especially on only one side of your body. ? Sudden vision changes. ? Sudden trouble speaking. ? Sudden confusion or trouble understanding simple statements. ? Sudden problems with walking or balance. ? A sudden, severe headache that is different from past headaches.     · You have severe back or belly pain. Do not wait until your blood pressure comes down on its own. Get help right away. Call your doctor now or seek immediate care if:    · Your blood pressure is much higher than normal (such as 180/120 or higher), but you don't have symptoms.     · You think high blood pressure is causing symptoms, such as:  ? Severe headache.  ? Blurry vision. Watch closely for changes in your health, and be sure to contact your doctor if:    · Your blood pressure measures higher than your doctor recommends at least 2 times. That means the top number is higher or the bottom number is higher, or both.     · You think you may be having side effects from your blood pressure medicine. Where can you learn more?   Go to http://www.gray.com/  Enter [id number] in the search box to learn more about \"High Blood Pressure: Care Instructions. \"  Current as of: April 29, 2021               Content Version: 13.0  © 2270-1352 [employer]. Care instructions adapted under license by [employer] (which disclaims liability or warranty for this information). If you have questions about a medical condition or this instruction, always ask your healthcare professional. [address] any warranty or liability for your use of this information. Patient Education        Low Sodium Diet (2,000 Milligram): Care Instructions  Overview     Limiting sodium can be an important part of managing some health problems. The most common source of sodium is salt. People get most of the salt in their diet from canned, prepared, and packaged foods. Fast food and restaurant meals also are very high in sodium. Your doctor will probably limit your sodium to less than 2,000 milligrams (mg) a day. This limit counts all the sodium in prepared and packaged foods and any salt you add to your food. Follow-up care is a key part of your treatment and safety. Be sure to make and go to all appointments, and call your doctor if you are having problems. It's also a good idea to know your test results and keep a list of the medicines you take. How can you care for yourself at home? Read food labels  · Read labels on cans and food packages. The labels tell you how much sodium is in each serving. Make sure that you look at the serving size. If you eat more than the serving size, you have eaten more sodium. · Food labels also tell you the Percent Daily Value for sodium. Choose products with low Percent Daily Values for sodium. · Be aware that sodium can come in forms other than salt, including monosodium glutamate (MSG), sodium citrate, and sodium bicarbonate (baking soda). MSG is often added to Asian food. When you eat out, you can sometimes ask for food without MSG or added salt.   Buy low-sodium foods  · Buy foods that are labeled \"unsalted\" (no salt added), \"sodium-free\" (less than 5 mg of sodium per serving), or \"low-sodium\" (140 mg or less of sodium per serving). Foods labeled \"reduced-sodium\" and \"light sodium\" may still have too much sodium. Be sure to read the label to see how much sodium you are getting. · Buy fresh vegetables, or frozen vegetables without added sauces. Buy low-sodium versions of canned vegetables, soups, and other canned goods. Prepare low-sodium meals  · Cut back on the amount of salt you use in cooking. This will help you adjust to the taste. Do not add salt after cooking. One teaspoon of salt has about 2,300 mg of sodium. · Take the salt shaker off the table. · Flavor your food with garlic, lemon juice, onion, vinegar, herbs, and spices. Do not use soy sauce, lite soy sauce, steak sauce, onion salt, garlic salt, celery salt, or ketchup on your food. · Use low-sodium salad dressings, sauces, and ketchup. Or make your own salad dressings and sauces without adding salt. · Use less salt (or none) when recipes call for it. You can often use half the salt a recipe calls for without losing flavor. Other foods such as rice, pasta, and grains do not need added salt. · Rinse canned vegetables, and cook them in fresh water. This removes some--but not all--of the salt. · Avoid water that is naturally high in sodium or that has been treated with water softeners, which add sodium. If you buy bottled water, read the label and choose a sodium-free brand. Avoid high-sodium foods  · Avoid eating:  ? Smoked, cured, salted, and canned meat, fish, and poultry. ? Ham, robb, hot dogs, and luncheon meats. ? Regular, hard, and processed cheese and regular peanut butter. ? Crackers with salted tops, and other salted snack foods such as pretzels, chips, and salted popcorn. ? Frozen prepared meals, unless labeled low-sodium. ?  Canned and dried soups, broths, and bouillon, unless labeled sodium-free or low-sodium. ? Canned vegetables, unless labeled sodium-free or low-sodium. ? Western Emily fries, pizza, tacos, and other fast foods. ? Pickles, olives, ketchup, and other condiments, especially soy sauce, unless labeled sodium-free or low-sodium. Where can you learn more? Go to http://www.gray.com/  Enter V843 in the search box to learn more about \"Low Sodium Diet (2,000 Milligram): Care Instructions. \"  Current as of: December 17, 2020               Content Version: 13.0  © 8107-3150 [employer]. Care instructions adapted under license by [employer] (which disclaims liability or warranty for this information). If you have questions about a medical condition or this instruction, always ask your healthcare professional. [place] [address] any warranty or liability for your use of this information. Patient Education        Urinary Tract Infection (UTI) in Women: Care Instructions  Overview     A urinary tract infection, or UTI, is a general term for an infection anywhere between the kidneys and the urethra (where urine comes out). Most UTIs are bladder infections. They often cause pain or burning when you urinate. UTIs are caused by bacteria and can be cured with antibiotics. Be sure to complete your treatment so that the infection does not get worse. Follow-up care is a key part of your treatment and safety. Be sure to make and go to all appointments, and call your doctor if you are having problems. It's also a good idea to know your test results and keep a list of the medicines you take. How can you care for yourself at home? · Take your antibiotics as directed. Do not stop taking them just because you feel better. You need to take the full course of antibiotics. · Drink extra water and other fluids for the next day or two. This will help make the urine less concentrated and help wash out the bacteria that are causing the infection.  (If you have kidney, heart, or liver disease and have to limit fluids, talk with your doctor before you increase the amount of fluids you drink.)  · Avoid drinks that are carbonated or have caffeine. They can irritate the bladder. · Urinate often. Try to empty your bladder each time. · To relieve pain, take a hot bath or lay a heating pad set on low over your lower belly or genital area. Never go to sleep with a heating pad in place. To prevent UTIs  · Drink plenty of water each day. This helps you urinate often, which clears bacteria from your system. (If you have kidney, heart, or liver disease and have to limit fluids, talk with your doctor before you increase the amount of fluids you drink.)  · Urinate when you need to. · If you are sexually active, urinate right after you have sex. · Change sanitary pads often. · Avoid douches, bubble baths, feminine hygiene sprays, and other feminine hygiene products that have deodorants. · After going to the bathroom, wipe from front to back. When should you call for help? Call your doctor now or seek immediate medical care if:    · Symptoms such as fever, chills, nausea, or vomiting get worse or appear for the first time.     · You have new pain in your back just below your rib cage. This is called flank pain.     · There is new blood or pus in your urine.     · You have any problems with your antibiotic medicine. Watch closely for changes in your health, and be sure to contact your doctor if:    · You are not getting better after taking an antibiotic for 2 days.     · Your symptoms go away but then come back. Where can you learn more? Go to http://www.gray.com/  Enter X240 in the search box to learn more about \"Urinary Tract Infection (UTI) in Women: Care Instructions. \"  Current as of: February 10, 2021               Content Version: 13.0  © 3267-9042 [employer].    Care instructions adapted under license by [employer] (which disclaims liability or warranty for this information). If you have questions about a medical condition or this instruction, always ask your healthcare professional. [address] any warranty or liability for your use of this information. , please follow up with 911 or primary Doctor. *  Please give a list of your current medications to your Primary Care Provider. *  Please update this list whenever your medications are discontinued, doses are      changed, or new medications (including over-the-counter products) are added. *  Please carry medication information at all times in case of emergency situations. These are general instructions for a healthy lifestyle:    No smoking/ No tobacco products/ Avoid exposure to second hand smoke  Surgeon General's Warning:  Quitting smoking now greatly reduces serious risk to your health. Obesity, smoking, and sedentary lifestyle greatly increases your risk for illness    A healthy diet, regular physical exercise & weight monitoring are important for maintaining a healthy lifestyle    You may be retaining fluid if you have a history of heart failure or if you experience any of the following symptoms:  Weight gain of 3 pounds or more overnight or 5 pounds in a week, increased swelling in our hands or feet or shortness of breath while lying flat in bed. Please call your doctor as soon as you notice any of these symptoms; do not wait until your next office visit. The discharge information has been reviewed with the patient. The patient verbalized understanding. Discharge medications reviewed with the patient and appropriate educational materials and side effects teaching were provided. ___________________________________________________________________________________________________________________________________      MyChart Activation    Thank you for requesting access to [facility].  Please follow the instructions below to securely access and download your online medical record. [facility] allows you to send messages to your doctor, view your test results, renew your prescriptions, schedule appointments, and more. How Do I Sign Up? 1. In your internet browser, go to [email or web address]  2. Click on the First Time User? Click Here link in the Sign In box. You will be redirect to the New Member Sign Up page. 3. Enter your Cert[employer] Access Code exactly as it appears below. You will not need to use this code after youve completed the sign-up process. If you do not sign up before the expiration date, you must request a new code. Cert[employer] Access Code: [email or web address]  Expires: 1/3/2022 10:16 AM (This is the date your Cert[employer] access code will )    4. Enter the last four digits of your Social Security Number (xxxx) and Date of Birth (mm/dd/yyyy) as indicated and click Submit. You will be taken to the next sign-up page. 5. Create a Certus ID. This will be your [employer] login ID and cannot be changed, so think of one that is secure and easy to remember. 6. Create a Cert[employer] password. You can change your password at any time. 7. Enter your Password Reset Question and Answer. This can be used at a later time if you forget your password. 8. Enter your e-mail address. You will receive e-mail notification when new information is available in [address] 9. Click Sign Up. You can now view and download portions of your medical record. 10. Click the Download Summary menu link to download a portable copy of your medical information. Additional Information    If you have questions, please visit the Frequently Asked Questions section of the Cert[employer] website at [email or web address] Remember, [employer] is NOT to be used for urgent needs. For medical emergencies, dial 911.

## 2021-11-22 NOTE — ROUTINE PROCESS
Discharge instructions reviewed with the patient. Patient verbalized understanding and verified by teach back. All questions answered. IV discontinued, no redness, swelling or pain noted. Patient awaiting family for transportation home with an ETA of 15 minutes. Patient discharged off the unit via wheelchair. Patient armband removed and shredded.

## 2021-11-22 NOTE — PROGRESS NOTES
CM notes patient to discharge today. Care Management/Patient Conversation: Cm poke with patient who confirmed she has a ride available at discharge. Care Management Interventions  PCP Verified by CM: Yes  Last Visit to PCP: 11/19/21  Mode of Transport at Discharge:  (family)  Transition of Care Consult (CM Consult): Discharge Planning  Support Systems: Spouse/Significant Other  Confirm Follow Up Transport: Family  The Plan for Transition of Care is Related to the Following Treatment Goals : home with family assistance and physician follow up  Discharge Location  Discharge Placement:  (homw with physician follow up and family assistance)    CM spoke with patient regarding plans for discharge today. Patient states that her family will be available to pick her up upon discharge. Nataliya denies any additional questions or concerns at this time. CM to follow the patient's progress and be available to assist with discharge planning as needed. CMS referral placed.     Patient will need follow up with urology, CMS to place referral.

## 2021-11-22 NOTE — ROUTINE PROCESS
Bedside and Verbal shift change report given to Damon Naylor RN by Manan Olvera RN. Report included the following information SBAR, Kardex, Intake/Output and MAR.

## 2021-11-22 NOTE — PROGRESS NOTES
Physician Progress Note      Odette Patricia  CSN #:                  [id number]  :                       1945  ADMIT DATE:       2021 10:26 AM  DISCH DATE:  RESPONDING  PROVIDER #:        Jessica Poole MD          QUERY TEXT:    Patient admitted with UTI d/t failed  out-pt treatment . Noted documentation of mixed  dc on urine culture  in  progress note  on  . In order to support the diagnosis of  UTI , please include additional clinical indicators in your documentation. Or please document if the diagnosis of UTI  has been ruled out after further study. The medical record reflects the following:  Risk Factors: Failed  out- pt  UTI  treatment  Clinical Indicators: UA- Leukocyte esterase , WBC TNTC, bacteria 2+, Urine CX -[id number] , MIXED UROGENITAL DC ISOLATED COLONIES/mL ,  Treatment: cefepime    Thank You  Denny Malone RN [facility]  [phone]  Options provided:  -- UTI present as evidenced by, Please document evidence.   -- UTI was ruled out  -- Other - I will add my own diagnosis  -- Disagree - Not applicable / Not valid  -- Disagree - Clinically unable to determine / Unknown  -- Refer to Clinical Documentation Reviewer    PROVIDER RESPONSE TEXT:    Urine cultures probably done after antibiotics administered    Query created by: Milvia Jackman on 2021 9:01 AM      Electronically signed by:  Jessica Poole MD 2021 11:10 AM

## 2022-03-18 PROBLEM — E86.0 DEHYDRATION: Status: ACTIVE | Noted: 2021-11-19

## 2022-03-19 PROBLEM — N17.9 AKI (ACUTE KIDNEY INJURY) (HCC): Status: ACTIVE | Noted: 2021-11-19

## 2022-03-19 PROBLEM — N39.0 ACUTE UTI: Status: ACTIVE | Noted: 2021-11-19

## 2022-03-20 PROBLEM — E87.1 HYPONATREMIA: Status: ACTIVE | Noted: 2021-11-19

## 2022-05-23 ENCOUNTER — HOSPITAL ENCOUNTER (OUTPATIENT)
Dept: PREADMISSION TESTING | Age: 77
Discharge: HOME | End: 2022-05-23
Payer: MEDICARE

## 2022-05-23 ENCOUNTER — TRANSCRIBE ORDER (OUTPATIENT)
Dept: REGISTRATION | Age: 77
End: 2022-05-23

## 2022-05-23 DIAGNOSIS — L53.9 GENERALIZED ERYTHRODERMA: Primary | ICD-10-CM

## 2022-05-23 DIAGNOSIS — L53.9 GENERALIZED ERYTHRODERMA: ICD-10-CM

## 2022-05-23 LAB
ANION GAP SERPL CALC-SCNC: 4 MMOL/L (ref 3–18)
BUN SERPL-MCNC: 13 MG/DL (ref 7–18)
BUN/CREAT SERPL: 17 (ref 12–20)
CALCIUM SERPL-MCNC: 9.8 MG/DL (ref 8.5–10.1)
CHLORIDE SERPL-SCNC: 106 MMOL/L (ref 100–111)
CO2 SERPL-SCNC: 30 MMOL/L (ref 21–32)
CREAT SERPL-MCNC: 0.75 MG/DL (ref 0.6–1.3)
ERYTHROCYTE [DISTWIDTH] IN BLOOD BY AUTOMATED COUNT: 14.1 % (ref 11.6–14.5)
EST. AVERAGE GLUCOSE BLD GHB EST-MCNC: 174 MG/DL
GLUCOSE SERPL-MCNC: 82 MG/DL (ref 74–99)
HBA1C MFR BLD: 7.7 % (ref 4.2–5.6)
HCT VFR BLD AUTO: 37.9 % (ref 35–45)
HGB BLD-MCNC: 12 G/DL (ref 12–16)
MCH RBC QN AUTO: 29 PG (ref 24–34)
MCHC RBC AUTO-ENTMCNC: 31.7 G/DL (ref 31–37)
MCV RBC AUTO: 91.5 FL (ref 78–100)
NRBC # BLD: 0 K/UL (ref 0–0.01)
NRBC BLD-RTO: 0 PER 100 WBC
PLATELET # BLD AUTO: 371 K/UL (ref 135–420)
PMV BLD AUTO: 9.6 FL (ref 9.2–11.8)
POTASSIUM SERPL-SCNC: 4.5 MMOL/L (ref 3.5–5.5)
RBC # BLD AUTO: 4.14 M/UL (ref 4.2–5.3)
SODIUM SERPL-SCNC: 140 MMOL/L (ref 136–145)
WBC # BLD AUTO: 8.5 K/UL (ref 4.6–13.2)

## 2022-05-23 PROCEDURE — 85027 COMPLETE CBC AUTOMATED: CPT

## 2022-05-23 PROCEDURE — 36415 COLL VENOUS BLD VENIPUNCTURE: CPT

## 2022-05-23 PROCEDURE — 93005 ELECTROCARDIOGRAM TRACING: CPT

## 2022-05-23 PROCEDURE — 80048 BASIC METABOLIC PNL TOTAL CA: CPT

## 2022-05-23 PROCEDURE — 83036 HEMOGLOBIN GLYCOSYLATED A1C: CPT

## 2022-05-24 ENCOUNTER — HOSPITAL ENCOUNTER (OUTPATIENT)
Dept: PREADMISSION TESTING | Age: 77
Discharge: HOME | End: 2022-05-24

## 2022-05-24 VITALS — WEIGHT: 185 LBS | BODY MASS INDEX: 29.73 KG/M2 | HEIGHT: 66 IN

## 2022-05-24 LAB
ATRIAL RATE: 85 BPM
CALCULATED P AXIS, ECG09: 72 DEGREES
CALCULATED R AXIS, ECG10: 42 DEGREES
CALCULATED T AXIS, ECG11: 59 DEGREES
DIAGNOSIS, 93000: NORMAL
P-R INTERVAL, ECG05: 182 MS
Q-T INTERVAL, ECG07: 356 MS
QRS DURATION, ECG06: 70 MS
QTC CALCULATION (BEZET), ECG08: 423 MS
VENTRICULAR RATE, ECG03: 85 BPM

## 2022-05-24 RX ORDER — BRIMONIDINE TARTRATE, TIMOLOL MALEATE 2; 5 MG/ML; MG/ML
1 SOLUTION/ DROPS OPHTHALMIC EVERY 12 HOURS
COMMUNITY

## 2022-05-24 RX ORDER — LISINOPRIL 20 MG/1
20 TABLET ORAL DAILY
COMMUNITY

## 2022-05-24 RX ORDER — SODIUM CHLORIDE, SODIUM LACTATE, POTASSIUM CHLORIDE, CALCIUM CHLORIDE 600; 310; 30; 20 MG/100ML; MG/100ML; MG/100ML; MG/100ML
125 INJECTION, SOLUTION INTRAVENOUS CONTINUOUS
Status: CANCELLED | OUTPATIENT
Start: 2022-05-24

## 2022-05-24 RX ORDER — CEFAZOLIN SODIUM/WATER 2 G/20 ML
2 SYRINGE (ML) INTRAVENOUS ONCE
Status: CANCELLED | OUTPATIENT
Start: 2022-05-24 | End: 2022-05-24

## 2022-05-24 RX ORDER — GUAIFENESIN 100 MG/5ML
81 LIQUID (ML) ORAL DAILY
COMMUNITY

## 2022-05-24 NOTE — PERIOP NOTES
PAT - SURGICAL PRE-ADMISSION INSTRUCTIONS    NAME:  Brien Hernández                                                          TODAY'S DATE:  5/24/2022    SURGERY DATE:  6/7/2022                                  SURGERY ARRIVAL TIME:   TBA    1. Do NOT eat or drink anything, including candy or gum, after MIDNIGHT on 6/7/2022 , unless you have specific instructions from your Surgeon or Anesthesia Provider to do so. 2. No smoking 24 hours before surgery. 3. No alcohol 24 hours prior to the day of surgery. 4. No recreational drugs for one week prior to the day of surgery. 5. Leave all valuables, including money/purse, at home. 6. Remove all jewelry, nail polish, makeup (including mascara); no lotions, powders, deodorant, or perfume/cologne/after shave. 7. Glasses/Contact lenses and Dentures may be worn to the hospital.  They will be removed prior to surgery. 8. Call your doctor if symptoms of a cold or illness develop within 24 ours prior to surgery. 9. AN ADULT MUST DRIVE YOU HOME AFTER OUTPATIENT SURGERY. 10. If you are having an OUTPATIENT procedure, please make arrangements for a responsible adult to be with you for 24 hours after your surgery. 11. If you are admitted to the hospital, you will be assigned to a bed after surgery is complete. Normally a family member will not be able to see you until you are in your assigned bed. 12. Visitation Restrictions Explained. Pt denies an advanced directive. Pt denies DNR  Dr. Kae Mccauley office staff notified of pt's HgA1C 7.7, spoke with Hannah Tiwari @ [phone]    Special Instructions:    Covid Test not required. Pt vaccinated, vaccination record in media. Pt instructed not to take Januvia the night before procedure. Pt instructed not to take lisinopril and novolog insulin injection 30units on DOS. Pt was instructed by Dr. Kae Mccauley staff to stop taking aspirin. Pt aware to stop taking 7 days before procedure. Patient Prep:    use CHG solution. Pt received. Instructions reviewed. These surgical instructions were reviewed with patient during the PAT phone interview.

## 2022-06-07 ENCOUNTER — HOSPITAL ENCOUNTER (OUTPATIENT)
Age: 77
Setting detail: OUTPATIENT SURGERY
Discharge: HOME | End: 2022-06-07
Attending: UROLOGY | Admitting: UROLOGY
Payer: MEDICARE

## 2022-06-07 ENCOUNTER — ANESTHESIA EVENT (OUTPATIENT)
Dept: SURGERY | Age: 77
End: 2022-06-07
Payer: MEDICARE

## 2022-06-07 ENCOUNTER — ANESTHESIA (OUTPATIENT)
Dept: SURGERY | Age: 77
End: 2022-06-07
Payer: MEDICARE

## 2022-06-07 VITALS
RESPIRATION RATE: 18 BRPM | DIASTOLIC BLOOD PRESSURE: 85 MMHG | BODY MASS INDEX: 32.15 KG/M2 | WEIGHT: 193 LBS | OXYGEN SATURATION: 100 % | HEART RATE: 103 BPM | HEIGHT: 65 IN | TEMPERATURE: 97.4 F | SYSTOLIC BLOOD PRESSURE: 166 MMHG

## 2022-06-07 PROBLEM — D49.4 NEOPLASM OF UNSPECIFIED BEHAVIOR OF BLADDER: Status: ACTIVE | Noted: 2022-06-07

## 2022-06-07 PROBLEM — R31.29 OTHER MICROSCOPIC HEMATURIA: Status: ACTIVE | Noted: 2022-06-07

## 2022-06-07 LAB
GLUCOSE BLD STRIP.AUTO-MCNC: 161 MG/DL (ref 70–110)
GLUCOSE BLD STRIP.AUTO-MCNC: 188 MG/DL (ref 70–110)

## 2022-06-07 PROCEDURE — 74011000250 HC RX REV CODE- 250: Performed by: ANESTHESIOLOGY

## 2022-06-07 PROCEDURE — 74011250636 HC RX REV CODE- 250/636: Performed by: UROLOGY

## 2022-06-07 PROCEDURE — 82962 GLUCOSE BLOOD TEST: CPT

## 2022-06-07 PROCEDURE — 77030018831 HC SOL IRR H20 BAXT -A: Performed by: UROLOGY

## 2022-06-07 PROCEDURE — 76060000032 HC ANESTHESIA 0.5 TO 1 HR: Performed by: UROLOGY

## 2022-06-07 PROCEDURE — 77030013079 HC BLNKT BAIR HGGR 3M -A: Performed by: ANESTHESIOLOGY

## 2022-06-07 PROCEDURE — 76210000028 HC REC RM PH II 4 TO 4.5 HR: Performed by: UROLOGY

## 2022-06-07 PROCEDURE — 76210000063 HC OR PH I REC FIRST 0.5 HR: Performed by: UROLOGY

## 2022-06-07 PROCEDURE — 77030020782 HC GWN BAIR PAWS FLX 3M -B: Performed by: UROLOGY

## 2022-06-07 PROCEDURE — 77030012508 HC MSK AIRWY LMA AMBU -A: Performed by: ANESTHESIOLOGY

## 2022-06-07 PROCEDURE — 2709999900 HC NON-CHARGEABLE SUPPLY: Performed by: UROLOGY

## 2022-06-07 PROCEDURE — 77030040361 HC SLV COMPR DVT MDII -B: Performed by: UROLOGY

## 2022-06-07 PROCEDURE — 88305 TISSUE EXAM BY PATHOLOGIST: CPT

## 2022-06-07 PROCEDURE — 74011636637 HC RX REV CODE- 636/637: Performed by: ANESTHESIOLOGY

## 2022-06-07 PROCEDURE — 51798 US URINE CAPACITY MEASURE: CPT

## 2022-06-07 PROCEDURE — 76010000138 HC OR TIME 0.5 TO 1 HR: Performed by: UROLOGY

## 2022-06-07 PROCEDURE — 74011250636 HC RX REV CODE- 250/636: Performed by: ANESTHESIOLOGY

## 2022-06-07 RX ORDER — FENTANYL CITRATE 50 UG/ML
INJECTION, SOLUTION INTRAMUSCULAR; INTRAVENOUS AS NEEDED
Status: DISCONTINUED | OUTPATIENT
Start: 2022-06-07 | End: 2022-06-07 | Stop reason: HOSPADM

## 2022-06-07 RX ORDER — INSULIN LISPRO 100 [IU]/ML
INJECTION, SOLUTION INTRAVENOUS; SUBCUTANEOUS ONCE
Status: DISCONTINUED | OUTPATIENT
Start: 2022-06-07 | End: 2022-06-07 | Stop reason: HOSPADM

## 2022-06-07 RX ORDER — SODIUM CHLORIDE, SODIUM LACTATE, POTASSIUM CHLORIDE, CALCIUM CHLORIDE 600; 310; 30; 20 MG/100ML; MG/100ML; MG/100ML; MG/100ML
125 INJECTION, SOLUTION INTRAVENOUS CONTINUOUS
Status: DISCONTINUED | OUTPATIENT
Start: 2022-06-07 | End: 2022-06-07 | Stop reason: HOSPADM

## 2022-06-07 RX ORDER — INSULIN LISPRO 100 [IU]/ML
INJECTION, SOLUTION INTRAVENOUS; SUBCUTANEOUS ONCE
Status: COMPLETED | OUTPATIENT
Start: 2022-06-07 | End: 2022-06-07

## 2022-06-07 RX ORDER — MAGNESIUM SULFATE 100 %
4 CRYSTALS MISCELLANEOUS AS NEEDED
Status: DISCONTINUED | OUTPATIENT
Start: 2022-06-07 | End: 2022-06-07 | Stop reason: HOSPADM

## 2022-06-07 RX ORDER — OXYCODONE AND ACETAMINOPHEN 5; 325 MG/1; MG/1
1 TABLET ORAL AS NEEDED
Status: DISCONTINUED | OUTPATIENT
Start: 2022-06-07 | End: 2022-06-07 | Stop reason: HOSPADM

## 2022-06-07 RX ORDER — FENTANYL CITRATE 50 UG/ML
25 INJECTION, SOLUTION INTRAMUSCULAR; INTRAVENOUS AS NEEDED
Status: DISCONTINUED | OUTPATIENT
Start: 2022-06-07 | End: 2022-06-07 | Stop reason: HOSPADM

## 2022-06-07 RX ORDER — AMOXICILLIN AND CLAVULANATE POTASSIUM 500; 125 MG/1; MG/1
1 TABLET, FILM COATED ORAL 2 TIMES DAILY
Qty: 14 TABLET | Refills: 0 | Status: SHIPPED | OUTPATIENT
Start: 2022-06-07 | End: 2022-06-14

## 2022-06-07 RX ORDER — LABETALOL HCL 20 MG/4 ML
SYRINGE (ML) INTRAVENOUS AS NEEDED
Status: DISCONTINUED | OUTPATIENT
Start: 2022-06-07 | End: 2022-06-07 | Stop reason: HOSPADM

## 2022-06-07 RX ORDER — SODIUM CHLORIDE 0.9 % (FLUSH) 0.9 %
5-40 SYRINGE (ML) INJECTION AS NEEDED
Status: DISCONTINUED | OUTPATIENT
Start: 2022-06-07 | End: 2022-06-07 | Stop reason: HOSPADM

## 2022-06-07 RX ORDER — PHENAZOPYRIDINE HYDROCHLORIDE 100 MG/1
100 TABLET, FILM COATED ORAL
Qty: 9 TABLET | Refills: 0 | Status: SHIPPED | OUTPATIENT
Start: 2022-06-07 | End: 2022-06-10

## 2022-06-07 RX ORDER — MIDAZOLAM HYDROCHLORIDE 1 MG/ML
INJECTION, SOLUTION INTRAMUSCULAR; INTRAVENOUS AS NEEDED
Status: DISCONTINUED | OUTPATIENT
Start: 2022-06-07 | End: 2022-06-07 | Stop reason: HOSPADM

## 2022-06-07 RX ORDER — CEFAZOLIN SODIUM/WATER 2 G/20 ML
2 SYRINGE (ML) INTRAVENOUS ONCE
Status: DISCONTINUED | OUTPATIENT
Start: 2022-06-07 | End: 2022-06-07 | Stop reason: HOSPADM

## 2022-06-07 RX ORDER — LIDOCAINE HYDROCHLORIDE 20 MG/ML
INJECTION, SOLUTION EPIDURAL; INFILTRATION; INTRACAUDAL; PERINEURAL AS NEEDED
Status: DISCONTINUED | OUTPATIENT
Start: 2022-06-07 | End: 2022-06-07 | Stop reason: HOSPADM

## 2022-06-07 RX ORDER — DEXTROSE MONOHYDRATE 100 MG/ML
0-250 INJECTION, SOLUTION INTRAVENOUS AS NEEDED
Status: DISCONTINUED | OUTPATIENT
Start: 2022-06-07 | End: 2022-06-07 | Stop reason: HOSPADM

## 2022-06-07 RX ORDER — SODIUM CHLORIDE 0.9 % (FLUSH) 0.9 %
5-40 SYRINGE (ML) INJECTION EVERY 8 HOURS
Status: DISCONTINUED | OUTPATIENT
Start: 2022-06-07 | End: 2022-06-07 | Stop reason: HOSPADM

## 2022-06-07 RX ORDER — PROPOFOL 10 MG/ML
INJECTION, EMULSION INTRAVENOUS AS NEEDED
Status: DISCONTINUED | OUTPATIENT
Start: 2022-06-07 | End: 2022-06-07 | Stop reason: HOSPADM

## 2022-06-07 RX ADMIN — LABETALOL 20 MG/4 ML (5 MG/ML) INTRAVENOUS SYRINGE 10 MG: at 10:29

## 2022-06-07 RX ADMIN — MIDAZOLAM 2 MG: 1 INJECTION INTRAMUSCULAR; INTRAVENOUS at 10:14

## 2022-06-07 RX ADMIN — PROPOFOL 12 MG: 10 INJECTION, EMULSION INTRAVENOUS at 10:19

## 2022-06-07 RX ADMIN — FENTANYL CITRATE 50 MCG: 50 INJECTION, SOLUTION INTRAMUSCULAR; INTRAVENOUS at 10:19

## 2022-06-07 RX ADMIN — LIDOCAINE HYDROCHLORIDE 80 MG: 20 INJECTION, SOLUTION EPIDURAL; INFILTRATION; INTRACAUDAL; PERINEURAL at 10:19

## 2022-06-07 RX ADMIN — SODIUM CHLORIDE, SODIUM LACTATE, POTASSIUM CHLORIDE, AND CALCIUM CHLORIDE 125 ML/HR: 600; 310; 30; 20 INJECTION, SOLUTION INTRAVENOUS at 11:41

## 2022-06-07 RX ADMIN — SODIUM CHLORIDE, SODIUM LACTATE, POTASSIUM CHLORIDE, AND CALCIUM CHLORIDE 125 ML/HR: 600; 310; 30; 20 INJECTION, SOLUTION INTRAVENOUS at 09:23

## 2022-06-07 RX ADMIN — FENTANYL CITRATE 50 MCG: 50 INJECTION, SOLUTION INTRAMUSCULAR; INTRAVENOUS at 10:38

## 2022-06-07 RX ADMIN — INSULIN LISPRO 2 UNITS: 100 INJECTION, SOLUTION INTRAVENOUS; SUBCUTANEOUS at 09:41

## 2022-06-07 RX ADMIN — SODIUM CHLORIDE, SODIUM LACTATE, POTASSIUM CHLORIDE, AND CALCIUM CHLORIDE 125 ML/HR: 600; 310; 30; 20 INJECTION, SOLUTION INTRAVENOUS at 14:22

## 2022-06-07 NOTE — OP NOTES
[facility]  OPERATIVE REPORT    Name:  Vera Leo  MR#:   [id number]  :  1945  ACCOUNT #:  [de-identified]  DATE OF SERVICE:  2022    PREOPERATIVE DIAGNOSIS:  Neoplasm of bladder unspecified. POSTOPERATIVE DIAGNOSES:  Neoplasm of bladder unspecified and gross hematuria. PROCEDURE PERFORMED:  Cystoscopy, bladder biopsy, and fulguration of bleeding. SURGEON:  Jasmyn Boucher MD    ASSISTANT:  None. ANESTHESIA:  General.    COMPLICATIONS:  None. SPECIMENS REMOVED:  Bladder erythema. IMPLANTS:  None. DRAINS:  None. ESTIMATED BLOOD LOSS:  Minimal.    FINDINGS:  The patient had right-sided erythema. It was biopsied. There were also patchy areas of hematuria on the right side of the bladder as well. INDICATIONS:  The patient is a 27-year-old female with a history of dysuria and discomfort and intermittent bleeding who was found to have some focal erythema on the right lateral wall of the bladder. Her urine cytology was negative. She presents for biopsy. PROCEDURE:  Preoperatively, risks and benefits of surgery were described to the patient. The risks included but were not limited to bleeding, infection, injury to the bladder, injury to the urethra, possible need for future procedures. The patient understood the risks and signed the informed consent. The patient was taken to the operating room and placed and placed on the OR table in supine position. She was administered general anesthetic. She was administered intravenous antibiotics. She was placed in dorsal lithotomy position and prepped and draped in the usual sterile manner. A 22-Swedish cystoscope and sheath were then inserted transurethrally atraumatically under direct vision using a 30-degree lens. Panendoscopy was done. Bilateral ureteral orifices were in normal anatomic position. There were no stones or papillary tumors.   However, there was patchy erythema especially on the right side of the bladder. There was some hematuria coming from the erythema as well. Using cup forceps, I took a biopsy of this erythema. I then fulgurated the area of biopsy, plus all the other areas of bleeding. By the end of the case, there was no significant bleeding and there was no injury to the bladder. At this point, the patient had her bladder emptied and the scope removed. She was then taken out of lithotomy position, revived from anesthesia and taken to Recovery in stable condition.       MD ANIL [id number]  D:  06/07/2022 11:05  T:  06/07/2022 11:24  JOB #:  [id number]

## 2022-06-07 NOTE — H&P
Urology [address] [place], [place] [address] [address]  Tel: [phone]  Fax: [phone]    Patient : Jose Reyez   YOB: 1945         Assessment/Plan  # Detail Type Description    Assessment Acute cystitis with hematuria (N30.01). Patient Plan Her most recent cultures have not shown any significant bacterial growth. Plan Orders The patient had the following order(s) completed today: UA In Office No Micro. Obtained on 05/19/2022, Interpretation: See module and Urine Culture, Routine to be performed. Obtained on 05/19/2022.         2. Assessment Other microscopic hematuria (R31.29). Patient Plan Her urine is sent for cytology. She has significant erythema and some necrosis of tissue in the surface of the bladder especially on the right side. Nothing that looks like a papillary tumor. 3. Assessment Erythema (L53.9). Patient Plan She has diffuse erythema in the bladder. It's mostly on the right side and towards the dome. We will do bladder bx's and fulgeration. Risk of bleeding, infection, injury were discussed. 4. Assessment Urge incontinence (N39.41). Patient Plan On Uribel she is a little improved. Florie Dannie was of no benefit. We may elect Trospium in the future. 5. Assessment Dysuria (R30.0). Additional Visit Information      This 68year old female presents for UTI, Hydronephrosis and Overactive Bladder. History of Present Illness  1.  UTI   The onset was 6 months ago. The problem is severe. The problem has not changed. The symptoms are recurring. Presenting/Initial symptoms include dysuria, fever and frequency. Pertinent history includes being over 50. Pertinent history does not include diabetes, alcohol consumption or prostatitis. Denies aggravating factors. She denies relieving factors. Associated symptoms include urinary urgency. Pertinent negatives include constipation.  Additional information: She was admitted to [facility] 11/2021 for a severe UTI and was treated and improved but it too ka  long time. 4/7/2022 -- she is doing about the same. She has some dysuria but mostly she is bothered by urinary frequency and urgency and it is not getting worse and there is no fever. A urine culture grew some aerococcus. Comments: 3/7/2022 -- she was treated last month for another UTI. She had suprapubic pain and dysuria and poor energy. She improved somewhat but not completely with antibiotics. No fevers. No hematuria. 05/05/2022:  Patient reports that the Carolyn Mowers was of no help. She is up and down through the night and is every hour to every hour and a half and is not getting any rest.  She is voiding at least every 2 hours during the day. 5/19/2022 -- she has some constant dysuria. She has urgency and frequency is still wearing pads. 2.  Hydronephrosis   Onset was 6 months ago. The problem is improving. Pertinent negatives include chills, constipation, diarrhea, fever, nausea and vomiting. Additional information: Admitted 11/2021 with creat = 2.1 and hydro seen on CT. Her creatinine improved with hdyration. 4/7/2022 -- no flank pain or gross hematuria or fevers. No nausea. .            Comments: CT (11/19/2021) - personally reviewed) --  Marked urinary bladder distention with mural thickening and perivesicular  stranding, likely reflecting cystitis. Correlate with urinalysis. Mild left hydroureteronephrosis without evidence of obstructing lesion, likely  sequela of urinary retention and cystitis. 3/7/2022 -- she denies flank pain or gross hematuria. Renal ultrasound (02/2022):  Personally reviewed:  Negative for hydronephrosis or stone or mass    05/05/2022:  Denies flank pain today. No gross hematuria. 3.  Overactive Bladder   Onset was gradual. Severity level is moderate-severe. It occurs daily. The problem is with no change.  Associated symptoms include pressure, urgency and urinary incontinence (urgency). Pertinent negatives include chills, constipation and fever. Additional information: She is weariing 3 heavy pads per day at baseline. 5/19/2022 -- this remains consistent. .          Past Medical/Surgical History   (Reviewed, updated)      Problem List  Problem List reviewed.    Problem Description Onset Date Chronic Clinical Status Notes   Dyslipidemia  N     Metabolic syndrome  N     Microalbuminuric diabetic nephropathy  N     Diabetes mellitus type 2  Y     Hypertension  Y     Hyperlipidemia  Y     Restless leg syndrome  Y     Retinitis pigmentosa  Y     Seasonal allergy  Y       Medications (active prior to today)  Medication Instructions Start Date Stop Date Refilled Elsewhere   aspirin 81 mg chewable tablet chew 1 tablet by oral route  every day //   Y   Combigan 0.2 %-0.5 % eye drops instill 1 drop by ophthalmic route  every 12 hours into both  eye(s) //   Y   FreeStyle Lite Strips use 1 strip by Subcutaneous route 2 times every day 08/27/2020   N   FreeStyle Lite Strips use 1 strip by Subcutaneous route 2 times every day for E11.65 03/01/2021   N   FreeStyle Lite Strips use 1 strip by Subcutaneous route 2 times every day for e11.65 //   Y   Sure Comfort Pen Needle 30 gauge x 5/16\" Use as needed to check blood sugar for Diagnosis E11.69 04/15/2021  04/15/2021 N   Pen Needle 31 gauge x 1/4\"  for e11.65 06/08/2021 06/08/2021 N   Crestor 20 mg tablet take 1 tablet by oral route  every bedtime 09/22/2021 09/22/2021 N   ropinirole 1 mg tablet take 1 tablet by oral route  every day 09/27/2021 09/27/2021 N   Singulair 10 mg tablet take 1 tablet by oral route  every day in the evening 11/08/2021 11/08/2021 N   ropinirole 0.5 mg tablet take 1 tablet by oral route  every day in addition to 1 mg tab, total of 1.5 mg per day 11/24/2021   N   lisinopril 20 mg tablet take 1 tablet by oral route  every day 01/24/2022 01/24/2022 N   FreeStyle Quinten 2 [place] use to check blood sugar E11.65 02/11/2022   N   FreeStyle Quinten 2 Sensor kit use to check blood sugar for E65.11 02/11/2022   N   Novolog Mix 70-30 FlexPen U-100 Insulin 100 unit/mL subcutaneous pen inject 30 units in am and 40 units in pm  by subcutaneous route. //  02/11/2022 Y   Bydureon BCise 2 mg/0.85 mL subcutaneous auto-injector inject (2MG)  by subcutaneous route  every 7 days in the abdomen, thighs, or outer area of upper arm rotating injectionsites 02/23/2022   N   Januvia 100 mg tablet take 1 tablet by oral route  every day 04/26/2022 04/26/2022 N   Uribel 118 mg-10 mg-40.8 mg-36 mg capsule take 1 capsule by oral route 4 times every day as needed 05/05/2022   N   levofloxacin 500 mg tablet take 1 tablet by oral route  every 24 hours 05/05/2022   N       Medication Reconciliation  Medications reconciled today. Allergies  Ingredient Reaction (Severity) Medication Name Comment   BACITRACIN Hives/Skin Rash     METFORMIN Upset stomach (severe); Diarrhea (severe)       Reviewed, updated. Family History   (Reviewed, updated)      Social History  (Reviewed, updated)  Tobacco use reviewed. Preferred language is [place]. Marital Status/Family/Social Support  Marital status:      Tobacco use status: Current non-smoker. Smoking status: Never smoker. Tobacco Screening  Patient has never used tobacco. Patient has not used tobacco in the last 30 days. Patient has not used smokeless tobacco in the last 30 days. Smoking Status  Type Smoking Status Usage Per Day Years Used Pack Years Total Pack Years    Never smoker         Alcohol  There is no history of alcohol use. Caffeine  The patient uses caffeine: coffee - 1 cup a day. Rastafarian/Spiritual  Patient agrees to transfusion. Review of Systems  System Neg/Pos Details   Constitutional Negative Chills and Fever. ENMT Negative Ear infections and Sore throat. Eyes Negative Blurred vision, Double vision and Eye pain.    Respiratory Negative Asthma, Chronic cough, Dyspnea and Wheezing. Cardio Negative Chest pain. GI Negative Constipation, Decreased appetite, Diarrhea, Nausea and Vomiting.  Positive Pressure, Urgency, Urinary incontinence. Endocrine Negative Cold intolerance, Heat intolerance, Increased thirst and Weight loss. Neuro Negative Headache and Tremors. Psych Negative Anxiety and Depression. Integumentary Negative Itching skin and Rash. MS Negative Back pain and Joint pain. Hema/Lymph Negative Easy bleeding. Vital Signs   Height  Time ft in cm Last Measured Height Position   9:53 AM 5.0 6.00 167.64 01/10/2022 Standing     Weight/BSA/BMI  Time lb oz kg Context BMI kg/m2 BSA m2   9:53 .00  88.904  31.63      Measured by  Time Measured by   9:53 AM Colin Saltness     Physical Exam  Exam Findings Details   Constitutional Normal Well developed. Neck Exam Normal Inspection - Normal.   Respiratory Normal Inspection - Normal.   Abdomen Normal No abdominal tenderness. Genitourinary Normal Urethral meatus - Normal. External genitalia - Normal. Glands - Normal. Perineum - Normal. Vagina - Normal. No Suprapubic tenderness. No CVA tenderness. Extremity Normal No Edema. Psychiatric Normal Orientation - Oriented to time, place, person & situation. Appropriate mood and affect. Immunizations Entered by History  Date Immunization   10/27/2021 12:00:00 AM Seasonal, quadrivalent, recombinant, injectable influenza vaccine, preservative free   10/7/2021 12:00:00 AM SARS-COV-2 (COVID-19) vaccine, mRNA, spike protein, LNP, preservative free, 30 mcg/0.3mL dose   2/11/2021 12:00:00 AM SARS-COV-2 (COVID-19) vaccine, mRNA, spike protein, LNP, preservative free, 30 mcg/0.3mL dose   1/15/2021 12:00:00 AM SARS-COV-2 (COVID-19) vaccine, mRNA, spike protein, LNP, preservative free, 30 mcg/0.3mL dose         Cystoscopy indication  Other. Patient consent  Consent was obtained. The procedure and risks were explained in detail.  Questions were encouraged and answered. The patient was prepped and draped in the usual sterile fashion. Procedure  A diagnostic cystourethroscopy was performed    Anesthesia  No anesthesia. Patient position  Dorsal lithotomy. Patient response  Patient tolerated procedure well. Patient was given instructions. Patient was discharged in stable condition. Findings   The bladder has erythema located at the Lateral wall bladder. Ureteral orifices normal in appearance. Impression  Dysuria R30.0. Medications (added, continued, or stopped today)  Start Date Medication Directions PRN Status PRN Reason Instruction Stop Date    aspirin 81 mg chewable tablet chew 1 tablet by oral route  every day N      02/23/2022 Bydureon BCise 2 mg/0.85 mL subcutaneous auto-injector inject (2MG)  by subcutaneous route  every 7 days in the abdomen, thighs, or outer area of upper arm rotating injectionsites N       Combigan 0.2 %-0.5 % eye drops instill 1 drop by ophthalmic route  every 12 hours into both  eye(s) N      09/22/2021 Crestor 20 mg tablet take 1 tablet by oral route  every bedtime N      02/11/2022 FreeStyle Quinten 2 [place] use to check blood sugar E11.65 N      02/11/2022 FreeStyle Quinten 2 Sensor kit use to check blood sugar for E65.11 N      08/27/2020 FreeStyle Lite Strips use 1 strip by Subcutaneous route 2 times every day N      03/01/2021 FreeStyle Lite Strips use 1 strip by Subcutaneous route 2 times every day for E11.65 N E11.65      FreeStyle Lite Strips use 1 strip by Subcutaneous route 2 times every day for e11.65 N e11.65     04/26/2022 Januvia 100 mg tablet take 1 tablet by oral route  every day N      05/05/2022 levofloxacin 500 mg tablet take 1 tablet by oral route  every 24 hours N      01/24/2022 lisinopril 20 mg tablet take 1 tablet by oral route  every day N       Novolog Mix 70-30 FlexPen U-100 Insulin 100 unit/mL subcutaneous pen inject 30 units in am and 40 units in pm  by subcutaneous route.  Shalom Dimas 06/08/2021 Pen Needle 31 gauge x 1/4\"  for e11.65 N e11.65     11/24/2021 ropinirole 0.5 mg tablet take 1 tablet by oral route  every day in addition to 1 mg tab, total of 1.5 mg per day N      09/27/2021 ropinirole 1 mg tablet take 1 tablet by oral route  every day N      11/08/2021 Singulair 10 mg tablet take 1 tablet by oral route  every day in the evening N      04/15/2021 Sure Comfort Pen Needle 30 gauge x 5/16\" Use as needed to check blood sugar for Diagnosis E11.69 N      05/05/2022 Uribel 118 mg-10 mg-40.8 mg-36 mg capsule take 1 capsule by oral route 4 times every day as needed Y          Active Patient Care Team Members  Name Contact Agency Type Support Role Relationship Active Date Inactive Date Specialty   Teri Kirkpatrick   Patient provider PCP   Family Practice       Provider:        Adelita eLa MD

## 2022-06-07 NOTE — PERIOP NOTES
Discharge instructions reviewed with patient and family. Opportunity for questions and answers given. No further questions at this time.    Assisted to BR to void

## 2022-06-07 NOTE — PERIOP NOTES
Called holding and notified Vik Haile Rn that patient's A1C is 7.7. She states she will notify Dr. Duane Wade prior to procedure.

## 2022-06-07 NOTE — PERIOP NOTES
Blood glucose 161, trending down from last check.  Pt was given 2 units at 09:41, did not administer more insulin

## 2022-06-07 NOTE — PERIOP NOTES
After 2 attempts in BR - pt only voided 250ml bernabe colored urine -Bladder Scan 108 - will encourage po fluids and continue IV hydration

## 2022-06-07 NOTE — PERIOP NOTES
Dr. Dejah Nunez notified pt multiple visits to BR - bladder scan 108 -  voiding 25 - 50 ml each time.   Will give it some time - need to repeat bladder scan

## 2022-06-07 NOTE — PERIOP NOTES
Reviewed PTA medication list with patient/caregiver and patient/caregiver denies any additional medications. Patient admits to having a responsible adult care for them at home for at least 24 hours after surgery. Patient encouraged to use gown warming system and informed that using said warming gown to regulate body temperature prior to a procedure has been shown to help reduce the risks of blood clots and infection. Patient's pharmacy of choice verified and documented in PTA medication section. Dual skin assessment & fall risk band verification completed with Rajani Falcon RN.

## 2022-06-07 NOTE — ANESTHESIA PREPROCEDURE EVALUATION
Relevant Problems   CARDIOVASCULAR   (+) Hypertension      RENAL FAILURE   (+) JOHNATHAN (acute kidney injury) ([facility] [address]      ENDOCRINE   (+) Diabetes (HCC)       Anesthetic History   No history of anesthetic complications            Review of Systems / Medical History  Patient summary reviewed, nursing notes reviewed and pertinent labs reviewed    Pulmonary  Within defined limits                 Neuro/Psych   Within defined limits           Cardiovascular    Hypertension: well controlled                   GI/Hepatic/Renal                Endo/Other    Diabetes: type 1, using insulin    Arthritis     Other Findings              Physical Exam    Airway  Mallampati: II  TM Distance: 4 - 6 cm  Neck ROM: normal range of motion   Mouth opening: Normal     Cardiovascular    Rhythm: regular  Rate: normal         Dental  No notable dental hx       Pulmonary  Breath sounds clear to auscultation               Abdominal         Other Findings            Anesthetic Plan    ASA: 3  Anesthesia type: general          Induction: Intravenous  Anesthetic plan and risks discussed with: Patient

## 2022-06-07 NOTE — DISCHARGE INSTRUCTIONS
Follow all discharge instructions from Dr. Ezio Patel  Resume pre hospital diet  Drink plenty of fluids  Ambulate in house  Take prescriptions as directed  Continue home medications  Shower tomorrow  Dr. Rhys Angelo office will call with follow up appointment        DISCHARGE SUMMARY from Nurse    PATIENT INSTRUCTIONS:    After general anesthesia or intravenous sedation, for 24 hours or while taking prescription Narcotics:  · Limit your activities  · Do not drive and operate hazardous machinery  · Do not make important personal or business decisions  · Do  not drink alcoholic beverages  · If you have not urinated within 8 hours after discharge, please contact your surgeon on call. Report the following to your surgeon:  · Excessive pain, swelling, redness or odor of or around the surgical area  · Temperature over 100.5  · Nausea and vomiting lasting longer than 4 hours or if unable to take medications  · Any signs of decreased circulation or nerve impairment to extremity: change in color, persistent  numbness, tingling, coldness or increase pain  · Any questions    What to do at Home:  Recommended activity: Ambulate in house and No driving while on analgesics,     If you experience any of the following symptoms fever,chills,uncontrollable pain , active bleeding ( thick bloody urine ), difficulty urinating, nausea, vomiting, please follow up with Dr. Ezio Patel. *  Please give a list of your current medications to your Primary Care Provider. *  Please update this list whenever your medications are discontinued, doses are      changed, or new medications (including over-the-counter products) are added. *  Please carry medication information at all times in case of emergency situations.     These are general instructions for a healthy lifestyle:    No smoking/ No tobacco products/ Avoid exposure to second hand smoke  Surgeon General's Warning:  Quitting smoking now greatly reduces serious risk to your health. Obesity, smoking, and sedentary lifestyle greatly increases your risk for illness    A healthy diet, regular physical exercise & weight monitoring are important for maintaining a healthy lifestyle    You may be retaining fluid if you have a history of heart failure or if you experience any of the following symptoms:  Weight gain of 3 pounds or more overnight or 5 pounds in a week, increased swelling in our hands or feet or shortness of breath while lying flat in bed. Please call your doctor as soon as you notice any of these symptoms; do not wait until your next office visit. Patient armband removed and shredded    The discharge information has been reviewed with the patient and caregiver. The patient and caregiver verbalized understanding. Discharge medications reviewed with the patient and caregiver and appropriate educational materials and side effects teaching were provided.   ___________________________________________________________________________________________________________________________________

## 2022-06-07 NOTE — ANESTHESIA POSTPROCEDURE EVALUATION
Procedure(s):  CYSTOSCOPY, BLADDER BIOPSY, FULGURATION. general    Anesthesia Post Evaluation      Multimodal analgesia: multimodal analgesia used between 6 hours prior to anesthesia start to PACU discharge  Patient location during evaluation: bedside  Patient participation: complete - patient participated  Level of consciousness: awake  Pain management: adequate  Airway patency: patent  Anesthetic complications: no  Cardiovascular status: acceptable  Respiratory status: acceptable  Hydration status: acceptable  Post anesthesia nausea and vomiting:  none  Final Post Anesthesia Temperature Assessment:  Normothermia (36.0-37.5 degrees C)      INITIAL Post-op Vital signs:   Vitals Value Taken Time   /75 06/07/22 1116   Temp     Pulse 81 06/07/22 1119   Resp 17 06/07/22 1119   SpO2 92 % 06/07/22 1119   Vitals shown include unvalidated device data.

## 2022-11-29 ENCOUNTER — APPOINTMENT (OUTPATIENT)
Dept: CT IMAGING | Age: 77
DRG: 683 | End: 2022-11-29
Attending: HOSPITALIST
Payer: MEDICARE

## 2022-11-29 ENCOUNTER — APPOINTMENT (OUTPATIENT)
Dept: GENERAL RADIOLOGY | Age: 77
DRG: 683 | End: 2022-11-29
Attending: EMERGENCY MEDICINE
Payer: MEDICARE

## 2022-11-29 ENCOUNTER — HOSPITAL ENCOUNTER (INPATIENT)
Age: 77
LOS: 5 days | Discharge: HOME HEALTH | DRG: 683 | End: 2022-12-04
Attending: EMERGENCY MEDICINE | Admitting: HOSPITALIST
Payer: MEDICARE

## 2022-11-29 ENCOUNTER — APPOINTMENT (OUTPATIENT)
Dept: VASCULAR SURGERY | Age: 77
DRG: 683 | End: 2022-11-29
Attending: EMERGENCY MEDICINE
Payer: MEDICARE

## 2022-11-29 ENCOUNTER — APPOINTMENT (OUTPATIENT)
Dept: NUCLEAR MEDICINE | Age: 77
DRG: 683 | End: 2022-11-29
Attending: EMERGENCY MEDICINE
Payer: MEDICARE

## 2022-11-29 DIAGNOSIS — J10.1 INFLUENZA A: ICD-10-CM

## 2022-11-29 DIAGNOSIS — N17.9 AKI (ACUTE KIDNEY INJURY) (HCC): ICD-10-CM

## 2022-11-29 DIAGNOSIS — E86.1 HYPOTENSION DUE TO HYPOVOLEMIA: ICD-10-CM

## 2022-11-29 DIAGNOSIS — R94.31 ABNORMAL EKG: ICD-10-CM

## 2022-11-29 DIAGNOSIS — E86.0 SEVERE DEHYDRATION: Primary | ICD-10-CM

## 2022-11-29 DIAGNOSIS — I95.89 HYPOTENSION DUE TO HYPOVOLEMIA: ICD-10-CM

## 2022-11-29 PROBLEM — E83.42 HYPOMAGNESEMIA: Status: ACTIVE | Noted: 2022-11-29

## 2022-11-29 PROBLEM — R00.0 TACHYCARDIA: Status: ACTIVE | Noted: 2022-11-29

## 2022-11-29 PROBLEM — I95.9 HYPOTENSION: Status: ACTIVE | Noted: 2022-11-29

## 2022-11-29 PROBLEM — J11.1 FLU: Status: ACTIVE | Noted: 2022-11-29

## 2022-11-29 PROBLEM — R77.8 ELEVATED TROPONIN: Status: ACTIVE | Noted: 2022-11-29

## 2022-11-29 PROBLEM — R19.7 DIARRHEA: Status: ACTIVE | Noted: 2022-11-29

## 2022-11-29 PROBLEM — N39.0 UTI (URINARY TRACT INFECTION): Status: ACTIVE | Noted: 2022-11-29

## 2022-11-29 LAB
ALBUMIN SERPL-MCNC: 3.2 G/DL (ref 3.4–5)
ALBUMIN/GLOB SERPL: 0.8 {RATIO} (ref 0.8–1.7)
ALP SERPL-CCNC: 60 U/L (ref 45–117)
ALT SERPL-CCNC: 27 U/L (ref 13–56)
ANION GAP SERPL CALC-SCNC: 17 MMOL/L (ref 3–18)
APPEARANCE UR: ABNORMAL
AST SERPL-CCNC: 48 U/L (ref 10–38)
ATRIAL RATE: 123 BPM
BACTERIA URNS QL MICRO: ABNORMAL /HPF
BASOPHILS # BLD: 0 K/UL (ref 0–0.1)
BASOPHILS NFR BLD: 0 % (ref 0–2)
BILIRUB SERPL-MCNC: 1 MG/DL (ref 0.2–1)
BILIRUB UR QL: ABNORMAL
BNP SERPL-MCNC: 2430 PG/ML (ref 0–1800)
BUN SERPL-MCNC: 52 MG/DL (ref 7–18)
BUN/CREAT SERPL: 17 (ref 12–20)
CALCIUM SERPL-MCNC: 9 MG/DL (ref 8.5–10.1)
CALCULATED P AXIS, ECG09: 31 DEGREES
CALCULATED R AXIS, ECG10: 1 DEGREES
CALCULATED T AXIS, ECG11: 56 DEGREES
CHLORIDE SERPL-SCNC: 96 MMOL/L (ref 100–111)
CO2 SERPL-SCNC: 18 MMOL/L (ref 21–32)
COLOR UR: ABNORMAL
CREAT SERPL-MCNC: 3.11 MG/DL (ref 0.6–1.3)
DIAGNOSIS, 93000: NORMAL
DIFFERENTIAL METHOD BLD: ABNORMAL
EOSINOPHIL # BLD: 0 K/UL (ref 0–0.4)
EOSINOPHIL NFR BLD: 0 % (ref 0–5)
EPITH CASTS URNS QL MICRO: ABNORMAL /LPF (ref 0–5)
ERYTHROCYTE [DISTWIDTH] IN BLOOD BY AUTOMATED COUNT: 14.1 % (ref 11.6–14.5)
EST. AVERAGE GLUCOSE BLD GHB EST-MCNC: 154 MG/DL
FLUAV RNA SPEC QL NAA+PROBE: DETECTED
FLUBV RNA SPEC QL NAA+PROBE: NOT DETECTED
GLOBULIN SER CALC-MCNC: 3.8 G/DL (ref 2–4)
GLUCOSE BLD STRIP.AUTO-MCNC: 135 MG/DL (ref 70–110)
GLUCOSE BLD STRIP.AUTO-MCNC: 149 MG/DL (ref 70–110)
GLUCOSE BLD STRIP.AUTO-MCNC: 236 MG/DL (ref 70–110)
GLUCOSE SERPL-MCNC: 270 MG/DL (ref 74–99)
GLUCOSE UR STRIP.AUTO-MCNC: NEGATIVE MG/DL
GRAN CASTS URNS QL MICRO: ABNORMAL /LPF
HBA1C MFR BLD: 7 % (ref 4.2–5.6)
HCT VFR BLD AUTO: 43.6 % (ref 35–45)
HGB BLD-MCNC: 14.8 G/DL (ref 12–16)
HGB UR QL STRIP: ABNORMAL
IMM GRANULOCYTES # BLD AUTO: 0 K/UL (ref 0–0.04)
IMM GRANULOCYTES NFR BLD AUTO: 0 % (ref 0–0.5)
KETONES UR QL STRIP.AUTO: ABNORMAL MG/DL
LACTATE BLD-SCNC: 1.35 MMOL/L (ref 0.4–2)
LACTATE BLD-SCNC: 3.29 MMOL/L (ref 0.4–2)
LEUKOCYTE ESTERASE UR QL STRIP.AUTO: ABNORMAL
LYMPHOCYTES # BLD: 1.6 K/UL (ref 0.9–3.6)
LYMPHOCYTES NFR BLD: 15 % (ref 21–52)
MCH RBC QN AUTO: 29.4 PG (ref 24–34)
MCHC RBC AUTO-ENTMCNC: 33.9 G/DL (ref 31–37)
MCV RBC AUTO: 86.5 FL (ref 78–100)
MONOCYTES # BLD: 1.7 K/UL (ref 0.05–1.2)
MONOCYTES NFR BLD: 16 % (ref 3–10)
NEUTS SEG # BLD: 7.2 K/UL (ref 1.8–8)
NEUTS SEG NFR BLD: 69 % (ref 40–73)
NITRITE UR QL STRIP.AUTO: POSITIVE
NRBC # BLD: 0 K/UL (ref 0–0.01)
NRBC BLD-RTO: 0 PER 100 WBC
P-R INTERVAL, ECG05: 164 MS
PH UR STRIP: 5 [PH] (ref 5–8)
PLATELET # BLD AUTO: 178 K/UL (ref 135–420)
PMV BLD AUTO: 11.4 FL (ref 9.2–11.8)
POTASSIUM SERPL-SCNC: 4.4 MMOL/L (ref 3.5–5.5)
PROT SERPL-MCNC: 7 G/DL (ref 6.4–8.2)
PROT UR STRIP-MCNC: 300 MG/DL
Q-T INTERVAL, ECG07: 388 MS
QRS DURATION, ECG06: 66 MS
QTC CALCULATION (BEZET), ECG08: 555 MS
RBC # BLD AUTO: 5.04 M/UL (ref 4.2–5.3)
RBC #/AREA URNS HPF: ABNORMAL /HPF (ref 0–5)
SARS-COV-2, COV2: NOT DETECTED
SODIUM SERPL-SCNC: 131 MMOL/L (ref 136–145)
SP GR UR REFRACTOMETRY: 1.02 (ref 1–1.03)
TROPONIN-HIGH SENSITIVITY: 152 NG/L (ref 0–54)
TROPONIN-HIGH SENSITIVITY: 224 NG/L (ref 0–54)
UROBILINOGEN UR QL STRIP.AUTO: 1 EU/DL (ref 0.2–1)
VENTRICULAR RATE, ECG03: 123 BPM
WBC # BLD AUTO: 10.5 K/UL (ref 4.6–13.2)
WBC URNS QL MICRO: ABNORMAL /HPF (ref 0–5)

## 2022-11-29 PROCEDURE — 93970 EXTREMITY STUDY: CPT

## 2022-11-29 PROCEDURE — 71045 X-RAY EXAM CHEST 1 VIEW: CPT

## 2022-11-29 PROCEDURE — 74011250637 HC RX REV CODE- 250/637: Performed by: EMERGENCY MEDICINE

## 2022-11-29 PROCEDURE — 83605 ASSAY OF LACTIC ACID: CPT

## 2022-11-29 PROCEDURE — 74011250637 HC RX REV CODE- 250/637: Performed by: HOSPITALIST

## 2022-11-29 PROCEDURE — 87186 SC STD MICRODIL/AGAR DIL: CPT

## 2022-11-29 PROCEDURE — 80053 COMPREHEN METABOLIC PANEL: CPT

## 2022-11-29 PROCEDURE — 74011000250 HC RX REV CODE- 250: Performed by: HOSPITALIST

## 2022-11-29 PROCEDURE — 74011250636 HC RX REV CODE- 250/636: Performed by: EMERGENCY MEDICINE

## 2022-11-29 PROCEDURE — 81001 URINALYSIS AUTO W/SCOPE: CPT

## 2022-11-29 PROCEDURE — 87086 URINE CULTURE/COLONY COUNT: CPT

## 2022-11-29 PROCEDURE — 74011250636 HC RX REV CODE- 250/636: Performed by: HOSPITALIST

## 2022-11-29 PROCEDURE — 96367 TX/PROPH/DG ADDL SEQ IV INF: CPT

## 2022-11-29 PROCEDURE — 85025 COMPLETE CBC W/AUTO DIFF WBC: CPT

## 2022-11-29 PROCEDURE — 96365 THER/PROPH/DIAG IV INF INIT: CPT

## 2022-11-29 PROCEDURE — A9540 TC99M MAA: HCPCS

## 2022-11-29 PROCEDURE — 83036 HEMOGLOBIN GLYCOSYLATED A1C: CPT

## 2022-11-29 PROCEDURE — 74011000258 HC RX REV CODE- 258: Performed by: EMERGENCY MEDICINE

## 2022-11-29 PROCEDURE — 87636 SARSCOV2 & INF A&B AMP PRB: CPT

## 2022-11-29 PROCEDURE — 87040 BLOOD CULTURE FOR BACTERIA: CPT

## 2022-11-29 PROCEDURE — 71250 CT THORAX DX C-: CPT

## 2022-11-29 PROCEDURE — 84484 ASSAY OF TROPONIN QUANT: CPT

## 2022-11-29 PROCEDURE — 83880 ASSAY OF NATRIURETIC PEPTIDE: CPT

## 2022-11-29 PROCEDURE — 93005 ELECTROCARDIOGRAM TRACING: CPT

## 2022-11-29 PROCEDURE — 87077 CULTURE AEROBIC IDENTIFY: CPT

## 2022-11-29 PROCEDURE — 65270000046 HC RM TELEMETRY

## 2022-11-29 PROCEDURE — 74011000258 HC RX REV CODE- 258: Performed by: HOSPITALIST

## 2022-11-29 PROCEDURE — 82962 GLUCOSE BLOOD TEST: CPT

## 2022-11-29 PROCEDURE — 96361 HYDRATE IV INFUSION ADD-ON: CPT

## 2022-11-29 PROCEDURE — 99285 EMERGENCY DEPT VISIT HI MDM: CPT

## 2022-11-29 RX ORDER — INSULIN LISPRO 100 [IU]/ML
INJECTION, SOLUTION INTRAVENOUS; SUBCUTANEOUS
Status: DISCONTINUED | OUTPATIENT
Start: 2022-11-29 | End: 2022-12-04 | Stop reason: HOSPADM

## 2022-11-29 RX ORDER — HYDRALAZINE HYDROCHLORIDE 20 MG/ML
10 INJECTION INTRAMUSCULAR; INTRAVENOUS
Status: DISCONTINUED | OUTPATIENT
Start: 2022-11-29 | End: 2022-12-04 | Stop reason: HOSPADM

## 2022-11-29 RX ORDER — ACETAMINOPHEN 650 MG/1
650 SUPPOSITORY RECTAL
Status: DISCONTINUED | OUTPATIENT
Start: 2022-11-29 | End: 2022-12-04 | Stop reason: HOSPADM

## 2022-11-29 RX ORDER — ROPINIROLE 0.25 MG/1
0.5 TABLET, FILM COATED ORAL DAILY
Status: DISCONTINUED | OUTPATIENT
Start: 2022-11-30 | End: 2022-11-30

## 2022-11-29 RX ORDER — OSELTAMIVIR PHOSPHATE 75 MG/1
75 CAPSULE ORAL DAILY
Status: DISCONTINUED | OUTPATIENT
Start: 2022-11-30 | End: 2022-11-29 | Stop reason: DRUGHIGH

## 2022-11-29 RX ORDER — MAGNESIUM SULFATE 100 %
4 CRYSTALS MISCELLANEOUS AS NEEDED
Status: DISCONTINUED | OUTPATIENT
Start: 2022-11-29 | End: 2022-12-04 | Stop reason: HOSPADM

## 2022-11-29 RX ORDER — HEPARIN SODIUM 5000 [USP'U]/ML
5000 INJECTION, SOLUTION INTRAVENOUS; SUBCUTANEOUS EVERY 8 HOURS
Status: DISCONTINUED | OUTPATIENT
Start: 2022-11-29 | End: 2022-12-04 | Stop reason: HOSPADM

## 2022-11-29 RX ORDER — VANCOMYCIN 1.75 GRAM/500 ML IN 0.9 % SODIUM CHLORIDE INTRAVENOUS
1750 ONCE
Status: COMPLETED | OUTPATIENT
Start: 2022-11-29 | End: 2022-11-29

## 2022-11-29 RX ORDER — INSULIN GLARGINE 100 [IU]/ML
10 INJECTION, SOLUTION SUBCUTANEOUS
Status: DISCONTINUED | OUTPATIENT
Start: 2022-11-29 | End: 2022-12-04 | Stop reason: HOSPADM

## 2022-11-29 RX ORDER — FACIAL-BODY WIPES
10 EACH TOPICAL DAILY PRN
Status: DISCONTINUED | OUTPATIENT
Start: 2022-11-29 | End: 2022-12-04 | Stop reason: HOSPADM

## 2022-11-29 RX ORDER — ACETAMINOPHEN 325 MG/1
650 TABLET ORAL
Status: DISCONTINUED | OUTPATIENT
Start: 2022-11-29 | End: 2022-12-04 | Stop reason: HOSPADM

## 2022-11-29 RX ORDER — SODIUM CHLORIDE 9 MG/ML
75 INJECTION, SOLUTION INTRAVENOUS CONTINUOUS
Status: DISPENSED | OUTPATIENT
Start: 2022-11-29 | End: 2022-12-01

## 2022-11-29 RX ORDER — PROMETHAZINE HYDROCHLORIDE 25 MG/1
12.5 TABLET ORAL
Status: DISCONTINUED | OUTPATIENT
Start: 2022-11-29 | End: 2022-12-04 | Stop reason: HOSPADM

## 2022-11-29 RX ORDER — GUAIFENESIN 100 MG/5ML
81 LIQUID (ML) ORAL DAILY
Status: DISCONTINUED | OUTPATIENT
Start: 2022-11-30 | End: 2022-12-04 | Stop reason: HOSPADM

## 2022-11-29 RX ORDER — MAGNESIUM SULFATE HEPTAHYDRATE 40 MG/ML
2 INJECTION, SOLUTION INTRAVENOUS
Status: COMPLETED | OUTPATIENT
Start: 2022-11-29 | End: 2022-11-29

## 2022-11-29 RX ORDER — MONTELUKAST SODIUM 10 MG/1
10 TABLET ORAL DAILY
Status: DISCONTINUED | OUTPATIENT
Start: 2022-11-30 | End: 2022-12-04 | Stop reason: HOSPADM

## 2022-11-29 RX ORDER — ROSUVASTATIN CALCIUM 10 MG/1
10 TABLET, COATED ORAL
Status: DISCONTINUED | OUTPATIENT
Start: 2022-11-29 | End: 2022-12-04 | Stop reason: HOSPADM

## 2022-11-29 RX ORDER — OSELTAMIVIR PHOSPHATE 30 MG/1
30 CAPSULE ORAL DAILY
Status: COMPLETED | OUTPATIENT
Start: 2022-11-29 | End: 2022-12-03

## 2022-11-29 RX ORDER — SODIUM CHLORIDE 0.9 % (FLUSH) 0.9 %
5-40 SYRINGE (ML) INJECTION EVERY 8 HOURS
Status: DISCONTINUED | OUTPATIENT
Start: 2022-11-29 | End: 2022-12-04 | Stop reason: HOSPADM

## 2022-11-29 RX ORDER — SODIUM CHLORIDE 0.9 % (FLUSH) 0.9 %
5-40 SYRINGE (ML) INJECTION AS NEEDED
Status: DISCONTINUED | OUTPATIENT
Start: 2022-11-29 | End: 2022-12-04 | Stop reason: HOSPADM

## 2022-11-29 RX ORDER — DEXTROSE MONOHYDRATE 100 MG/ML
0-250 INJECTION, SOLUTION INTRAVENOUS AS NEEDED
Status: DISCONTINUED | OUTPATIENT
Start: 2022-11-29 | End: 2022-12-04 | Stop reason: HOSPADM

## 2022-11-29 RX ORDER — AMLODIPINE BESYLATE 5 MG/1
5 TABLET ORAL DAILY
Status: DISCONTINUED | OUTPATIENT
Start: 2022-11-30 | End: 2022-12-04 | Stop reason: HOSPADM

## 2022-11-29 RX ORDER — ONDANSETRON 2 MG/ML
4 INJECTION INTRAMUSCULAR; INTRAVENOUS
Status: DISCONTINUED | OUTPATIENT
Start: 2022-11-29 | End: 2022-12-04 | Stop reason: HOSPADM

## 2022-11-29 RX ORDER — PANTOPRAZOLE SODIUM 40 MG/1
40 TABLET, DELAYED RELEASE ORAL
Status: DISCONTINUED | OUTPATIENT
Start: 2022-11-30 | End: 2022-12-04 | Stop reason: HOSPADM

## 2022-11-29 RX ADMIN — SODIUM CHLORIDE 75 ML/HR: 9 INJECTION, SOLUTION INTRAVENOUS at 18:09

## 2022-11-29 RX ADMIN — ACETAMINOPHEN 650 MG: 325 TABLET ORAL at 18:01

## 2022-11-29 RX ADMIN — VANCOMYCIN HYDROCHLORIDE 1750 MG: 10 INJECTION, POWDER, LYOPHILIZED, FOR SOLUTION INTRAVENOUS at 12:29

## 2022-11-29 RX ADMIN — HEPARIN SODIUM 5000 UNITS: 5000 INJECTION INTRAVENOUS; SUBCUTANEOUS at 18:01

## 2022-11-29 RX ADMIN — PIPERACILLIN AND TAZOBACTAM 3.38 G: 3; .375 INJECTION, POWDER, FOR SOLUTION INTRAVENOUS at 18:00

## 2022-11-29 RX ADMIN — MAGNESIUM SULFATE HEPTAHYDRATE 2 G: 40 INJECTION, SOLUTION INTRAVENOUS at 18:01

## 2022-11-29 RX ADMIN — MAGNESIUM SULFATE HEPTAHYDRATE 2 G: 40 INJECTION, SOLUTION INTRAVENOUS at 21:11

## 2022-11-29 RX ADMIN — PIPERACILLIN AND TAZOBACTAM 3.38 G: 3; .375 INJECTION, POWDER, FOR SOLUTION INTRAVENOUS at 11:46

## 2022-11-29 RX ADMIN — OSELTAMIVIR PHOSPHATE 30 MG: 30 CAPSULE ORAL at 18:06

## 2022-11-29 RX ADMIN — SODIUM CHLORIDE 1000 ML: 900 INJECTION, SOLUTION INTRAVENOUS at 11:21

## 2022-11-29 RX ADMIN — SODIUM CHLORIDE, PRESERVATIVE FREE 5 ML: 5 INJECTION INTRAVENOUS at 16:38

## 2022-11-29 RX ADMIN — SODIUM CHLORIDE, PRESERVATIVE FREE 10 ML: 5 INJECTION INTRAVENOUS at 23:21

## 2022-11-29 RX ADMIN — SODIUM CHLORIDE 1000 ML: 9 INJECTION, SOLUTION INTRAVENOUS at 11:10

## 2022-11-29 NOTE — H&P
History & Physical    Patient: Rose Dale MRN: [id number]  CSN: [id number]    YOB: 1945  Age: 68 y.o.   Sex: female      DOA: 11/29/2022  Primary Care Provider:  Owen Ruelas MD      Assessment/Plan     Hospital Problems  Date Reviewed: 6/7/2022            Codes Class Noted POA    * (Principal) Severe dehydration ICD-10-CM: E86.0  ICD-9-CM: 276.51  11/29/2022 Unknown        Hypotension ICD-10-CM: I95.9  ICD-9-CM: 458.9  11/29/2022 Unknown        Tachycardia ICD-10-CM: R00.0  ICD-9-CM: 785.0  11/29/2022 Unknown        Hypomagnesemia ICD-10-CM: E83.42  ICD-9-CM: 275.2  11/29/2022 Unknown        Elevated troponin ICD-10-CM: R77.8  ICD-9-CM: 790.6  11/29/2022 Unknown        UTI (urinary tract infection) ICD-10-CM: N39.0  ICD-9-CM: 599.0  11/29/2022 Unknown        Flu ICD-10-CM: J11.1  ICD-9-CM: 487.1  11/29/2022 Unknown        Diarrhea ICD-10-CM: R19.7  ICD-9-CM: 787.91  11/29/2022 Unknown        Dehydration ICD-10-CM: E86.0  ICD-9-CM: 276.51  11/19/2021 Yes        Hyponatremia ICD-10-CM: E87.1  ICD-9-CM: 276.1  11/19/2021 Yes        JOHNATHAN (acute kidney injury) ([facility] ICD-10-CM: N17.9  ICD-9-CM: 584.9  11/19/2021 Yes        Diabetes ([facility] ICD-10-CM: E11.9  ICD-9-CM: 250.00  Unknown Yes        Hypertension ICD-10-CM: I10  ICD-9-CM: 401.9  Unknown Yes             Admit to telemetry    Hypotension due to severe dehydration  BP responsive to the fluid  Continue normal saline    Flu A  Tamiflu renal dose, droplet isolation, supportive treatment, monitor complication from flu    JOHNATHAN  Like due to dehydration, continue normal saline infusion, renal dose medication, avoid NSAIDs and IV contrast    Nausea vomiting diarrhea  Will have CT abdomen without contrast  Collect stool sample for CDiff and bacterial panel  IV hydration symptoms treatment    UTI  Zosyn,-covered possible enteritis while ct pending   Follow-up with the culture    Hyponatremia, and hypomagnesemia  Normal saline infusion, magnesium replacement  Continue monitoring    Severe dehydration  Due to GI loss, normal saline infusion    Tachycardia and elevated troponin  No chest pain, will continue see the trend of troponin. Will call cardiologist if trending up  Echo, check TSH,  correcting dehydration and magnesium. PVL no DVT, VQ scan negative for PE    Lactic acidosis \like due to dehydration   Continue iv fluid and check lactic acid per protocol         DM type II ,   -long term insulin ,  -on lantus,  ssi, diabetic diet , hypoglycemia protocol       Full code     Please note that this dictation was completed with Match[employer], the [employer] voice recognition software. Quite often unanticipated grammatical, syntax, homophones, and other interpretive errors are inadvertently transcribed by the computer software. Please disregard these errors. Please excuse any errors that have escaped final proofreading    Estimate  length of stay : 2-3 day    DVT : heparin ppi proph  CC: n/v diarrhea        HPI:     Eliu Subramanian is a 68 y.o. female with history of hypertension, diabetes, presented to ER due to nausea vomiting and the diarrhea with decreased appetite for 2 or 3 days. She reported that her diarrhea mainly yesterday. It was watery diarrhea low blood. She presented hypotension and tachycardia. Sepsis protocol started. Her hypotension resolved upper 2 L normal saline. She still presented with tachycardia, her magnesium was 1.5. Creatinine is 3.1. She recent visit for diabetes at [place]. VQ scan was done in ER which is negative for PE, PVL was negative for DVT. Flu  A is positive. She also have mild elevated troponin. She denies any chest pain    Denies any slurred speech/headache/cp/n/v/blurred vision/palpitation/gait change/bleeding. Denies smoking/ any alcohol or drug use.        Visit Vitals  BP (!) 149/65 (BP Patient Position: Supine)   Pulse (!) 120   Temp 97.1 °F (36.2 °C)   Resp 18   Ht 5' 5.5\" (1.664 m)   Wt 83.9 kg (185 lb)   SpO2 97%   BMI 30.32 kg/m²      O2 Device: None (Room air)      Past Medical History:   Diagnosis Date    Arthritis     \"all over\"    Diabetes ([address]     Type II 20 yrs ago    Hypertension     Ill-defined condition     Retinitis pigmentosa       Past Surgical History:   Procedure Laterality Date    HX COLONOSCOPY      HX HEENT      cataracts 8-9 yrs ago    [address]    HX ORTHOPAEDIC  2010    right shoulder    HX ORTHOPAEDIC  2009    foot surgery d/t arthritis    HX WISDOM TEETH EXTRACTION      many yrs ago    [address] [place] [address]    calcium deposit taken out right breast     Fhx htn parents   Social History     Socioeconomic History    Marital status:    Tobacco Use    Smoking status: Former     Types: Cigarettes     Quit date:      Years since quittin.9    Smokeless tobacco: Never   Substance and Sexual Activity    Alcohol use: Not Currently    Drug use: Never       Prior to Admission medications    Medication Sig Start Date End Date Taking? Authorizing Provider   lisinopriL (PRINIVIL, ZESTRIL) 20 mg tablet Take 20 mg by mouth daily. Provider, Historical   brimonidine-timoloL (Combigan) 0.2-0.5 % drop ophthalmic solution Administer 1 Drop to both eyes every twelve (12) hours. 2 drops in am and 2 drops HS    Provider, Historical   aspirin 81 mg chewable tablet Take 81 mg by mouth daily. Provider, Historical   exenatide microspheres (BYDUREON SC) by SubCUTAneous route. Takes q     Provider, Historical   insulin aspart protamine/insulin aspart (NovoLOG Mix 70-30FlexPen U-100) 100 unit/mL (70-30) inpn by SubCUTAneous route. 30 units in AM    40 units at night. Danish Campbell MD   SITagliptin (Januvia) 25 mg tablet Take 25 mg by mouth daily. Danish Campbell MD   rosuvastatin (Crestor) 10 mg tablet Take 10 mg by mouth nightly. Danish Campbell MD   montelukast (SINGULAIR) 10 mg tablet Take 10 mg by mouth daily.     Danish Campbell MD   eye lubricant combination no.1 2-0.9-1.8 % drop Apply  to eye. Other, MD Danish   rOPINIRole (Requip) 1 mg tablet Take 0.5 mg by mouth daily. nightly 0.5mg at 4pm, and 1mg at night  Indications: restless legs syndrome, an extreme discomfort in the calf muscles when sitting or lying down    Provider, Historical       Allergies   Allergen Reactions    Bacitracin Rash    Jardiance [Empagliflozin] Other (comments)     Uti symptoms       Review of Systems  Gen: +fever, + chills,  +malaise, weight loss/gain. Heent: No headache, rhinorrhea, epistaxis, ear pain, hearing loss, sinus pain, neck pain/stiffness, sore throat. Heart: No chest pain, palpitations, JERRY, pnd, or orthopnea. Resp: No cough, hemoptysis, wheezing and shortness of breath. GI: +nausea, +vomiting, +diarrhea, no constipation, melena or hematochezia. : No urinary obstruction, dysuria or hematuria. Derm: No rash, new skin lesion or pruritis. Musc/skeletal: no bone or joint complains. Vasc: No edema, cyanosis or claudication. Endo: No heat/cold intolerance, no polyuria,polydipsia or polyphagia. Neuro: No unilateral weakness, numbness, tingling. No seizures. Heme: No easy bruising or bleeding. Physical Exam:     Physical Exam:  Visit Vitals  BP (!) 149/65 (BP Patient Position: Supine)   Pulse (!) 120   Temp 97.1 °F (36.2 °C)   Resp 18   Ht 5' 5.5\" (1.664 m)   Wt 83.9 kg (185 lb)   SpO2 97%   BMI 30.32 kg/m²      O2 Device: None (Room air)    Temp (24hrs), Av.1 °F (36.2 °C), Min:97.1 °F (36.2 °C), Max:97.1 °F (36.2 °C)    No intake/output data recorded. No intake/output data recorded. General:  Awake, cooperative, no distress. Head:  Normocephalic, without obvious abnormality, atraumatic. Eyes:  Conjunctivae/corneas clear, sclera anicteric, PERRL, EOMs intact. Nose: Nares normal. No drainage or sinus tenderness. Throat: Lips, mucosa, and tongue normal. .   Neck: Supple, symmetrical, trachea midline, no adenopathy.        Lungs: Clear to auscultation bilaterally. Heart:  tachy, S1, S2 normal, no murmur, click, rub or gallop. Abdomen: Soft, non-tender. Bowel sounds normal. No masses,  No organomegaly. Extremities: Extremities normal, atraumatic, no cyanosis or edema. Pulses: 2+ and symmetric all extremities. Skin: Skin color-pink, texture, turgor normal. No rashes or lesions. Capillary refill normal    Neurologic: CNII-XII intact. No focal motor or sensory deficit.        Labs Reviewed:    BMP:   Lab Results   Component Value Date/Time     (L) 11/29/2022 10:15 AM    K 4.4 11/29/2022 10:15 AM    CL 96 (L) 11/29/2022 10:15 AM    CO2 18 (L) 11/29/2022 10:15 AM    AGAP 17 11/29/2022 10:15 AM     (H) 11/29/2022 10:15 AM    BUN 52 (H) 11/29/2022 10:15 AM    CREA 3.11 (H) 11/29/2022 10:15 AM     CMP:   Lab Results   Component Value Date/Time     (L) 11/29/2022 10:15 AM    K 4.4 11/29/2022 10:15 AM    CL 96 (L) 11/29/2022 10:15 AM    CO2 18 (L) 11/29/2022 10:15 AM    AGAP 17 11/29/2022 10:15 AM     (H) 11/29/2022 10:15 AM    BUN 52 (H) 11/29/2022 10:15 AM    CREA 3.11 (H) 11/29/2022 10:15 AM    CA 9.0 11/29/2022 10:15 AM    ALB 3.2 (L) 11/29/2022 10:15 AM    TP 7.0 11/29/2022 10:15 AM    GLOB 3.8 11/29/2022 10:15 AM    AGRAT 0.8 11/29/2022 10:15 AM    ALT 27 11/29/2022 10:15 AM     CBC:   Lab Results   Component Value Date/Time    WBC 10.5 11/29/2022 10:15 AM    HGB 14.8 11/29/2022 10:15 AM    HCT 43.6 11/29/2022 10:15 AM     11/29/2022 10:15 AM     All Cardiac Markers in the last 24 hours: No results found for: CPK, CK, CKMMB, CKMB, RCK3, CKMBT, CKNDX, CKND1, KODAK, TROPT, TROIQ, CRUZITO, TROPT, TNIPOC, BNP, BNPP  Recent Glucose Results:   Lab Results   Component Value Date/Time     (H) 11/29/2022 10:15 AM     ABG: No results found for: PH, PHI, PCO2, PCO2I, PO2, PO2I, HCO3, HCO3I, FIO2, FIO2I  COAGS: No results found for: APTT, PTP, INR, INREXT, INREXT  Liver Panel:   Lab Results   Component Value Date/Time    ALB 3.2 (L) 11/29/2022 10:15 AM    TP 7.0 11/29/2022 10:15 AM    GLOB 3.8 11/29/2022 10:15 AM    AGRAT 0.8 11/29/2022 10:15 AM    ALT 27 11/29/2022 10:15 AM    AP 60 11/29/2022 10:15 AM     Pancreatic Markers: No results found for: AMYLPOCT, AML, LIPPOCT, LPSE    NM LUNG SCAN PERF    Result Date: 11/29/2022  EXAM: NUCLEAR MEDICINE PULMONARY PERFUSION SCAN CLINICAL INDICATION/HISTORY: High clinical suspicion for pulmonary embolism. Decreased oxygen saturation and recent travel. COMPARISON: None Available TECHNIQUE: No aerosolized 99mTc-DTPA ventilatory images of the lungs were obtained. After intravenous administration of 7.0 mCi 99mTc-MAA, perfusion images of the lungs were obtained in multiple projections. Images are correlated with portable radiograph dated 11/29/2022.    > Injection site:  Left antecubital fossa ________________________________ FINDINGS: Perfusion images show symmetric radiotracer distribution to both lungs, with no segmental perfusion defects to suggest the presence of pulmonary embolism. ________________________________     No pulmonary embolism. Perfusion only scintigraphy was performed and correlated with radiographic evaluation of the chest. This exam is reported using a perfusion only modified PIOPED II interpretive criteria. CT CHEST ABD PELV WO CONT    Result Date: 11/29/2022  EXAM: CT CHEST, ABDOMEN AND PELVIS CLINICAL INDICATION/HISTORY: Shortness of breath. Diarrhea. COMPARISON: None TECHNIQUE: Axial CT imaging of the chest, abdomen, and pelvis was performed without intravenous contrast. Multiplanar reformats were generated. One or more dose reduction techniques were used on this CT: automated exposure control, adjustment of the mAs and/or kVp according to patient size, and iterative reconstruction techniques. The specific techniques used on this CT exam have been documented in the patient's electronic medical record.  Digital Imaging and Communications in Medicine (DICOM) format image data are available to nonaffiliated external healthcare facilities or entities on a secure, media free, reciprocally searchable basis with patient authorization for at least a 12-month period after this study. ________________ FINDINGS: CHEST: LUNGS: Lung architecture appears normal. Mild bibasilar subsegmental atelectasis. No nodule/mass. PLEURA: Normal. AIRWAY: Normal. MEDIASTINUM: Normal heart size. No pericardial effusion. Severe coronary and aortic atherosclerotic calcifications. LYMPH NODES: No enlarged lymph nodes. OTHER: None. _______________ ABDOMEN/PELVIS: LIVER, BILIARY: Liver is normal. No biliary dilation. Cholecystectomy. SPLEEN: Normal. PANCREAS: Normal. ADRENALS: Normal. KIDNEYS/URETERS/BLADDER: Kidneys appear normal. Mild fat stranding around the urinary bladder. LYMPH NODES: No enlarged lymph nodes. GASTROINTESTINAL TRACT: Edematous wall thickening of the colon extending from the distal transverse through sigmoid colon with mild surrounding fat stranding. No evidence of obstruction. VASCULATURE: Severe atherosclerotic calcifications. PELVIC ORGANS: Hysterectomy BONES: No acute or aggressive osseous abnormalities identified. OTHER: None. _______________     1. There is edematous wall thickening of the colon from the distal transverse through proximal sigmoid consistent with colitis. This may be infectious, inflammatory, or ischemic in etiology. 2. Mild fat stranding around urinary bladder, correlate for possible cystitis. XR CHEST PORT    Result Date: 11/29/2022  A portable AP radiograph of the chest was obtained at 1334 hours: INDICATION:  Nausea, vomiting, sepsis. COMPARISON:  Portable chest 4/21/2010. FINDINGS:  Heart and mediastinum: Unremarkable. Lungs and pleura: Previously noted infiltrates have resolved with no new infiltrates seen. Lungs are hypoinflated. Aorta: Aortic arch partially calcified. Bones: Within normal limits for age. Other: None.      No significant abnormality. No new abnormality is seen developing since last study. DUPLEX LOWER EXT VENOUS BILAT    Result Date: 11/29/2022  · No evidence of deep vein thrombosis in the right lower extremity. · No evidence of deep vein thrombosis in the left lower extremity.       Procedures/imaging: see electronic medical records for all procedures/Xrays and details which were not copied into this note but were reviewed prior to creation of Alonzo Keith MD, Internal Medicine     CC: Carla Delgadillo MD

## 2022-11-29 NOTE — ED PROVIDER NOTES
EMERGENCY DEPARTMENT HISTORY AND PHYSICAL EXAM      Date: 11/29/2022  Patient Name: Mayda Cast    History of Presenting Illness     Chief Complaint   Patient presents with    Nausea    Vomiting       History Provided By: Patient    HPI: Mayda Cast, 68 y.o. female with history of hypertension and diabetes presents to the ED with cc of nausea, vomiting, weakness, poor p.o. intake. Symptoms have been present over the past 2 to 3 days. Symptoms started after recent travel to and from [place]. Denies prior history of DVT or PE, denies any chest pain or shortness of breath. She has had very poor p.o. intake over the past few days but denies any fevers or chills. Denies exposure to sick contacts, abdominal pain or urinary symptoms. There are no other complaints, changes, or physical findings at this time. PCP: Mike Israel MD    No current facility-administered medications on file prior to encounter. Current Outpatient Medications on File Prior to Encounter   Medication Sig Dispense Refill    lisinopriL (PRINIVIL, ZESTRIL) 20 mg tablet Take 20 mg by mouth daily. brimonidine-timoloL (Combigan) 0.2-0.5 % drop ophthalmic solution Administer 1 Drop to both eyes every twelve (12) hours. 2 drops in am and 2 drops HS      aspirin 81 mg chewable tablet Take 81 mg by mouth daily. exenatide microspheres (BYDUREON SC) by SubCUTAneous route. Takes q sunday      insulin aspart protamine/insulin aspart (NovoLOG Mix 70-30FlexPen U-100) 100 unit/mL (70-30) inpn by SubCUTAneous route. 30 units in AM    40 units at night. SITagliptin (Januvia) 25 mg tablet Take 25 mg by mouth daily. rosuvastatin (Crestor) 10 mg tablet Take 10 mg by mouth nightly. montelukast (SINGULAIR) 10 mg tablet Take 10 mg by mouth daily. eye lubricant combination no.1 2-0.9-1.8 % drop Apply  to eye. rOPINIRole (Requip) 1 mg tablet Take 0.5 mg by mouth daily.  nightly 0.5mg at 4pm, and 1mg at night  Indications: restless legs syndrome, an extreme discomfort in the calf muscles when sitting or lying down         Past History     Past Medical History:  Past Medical History:   Diagnosis Date    Arthritis     \"all over\"    Diabetes ([address]     Type II 20 yrs ago    Hypertension     Ill-defined condition     Retinitis pigmentosa       Past Surgical History:  Past Surgical History:   Procedure Laterality Date    HX COLONOSCOPY      HX HEENT      cataracts 8-9 yrs ago    [facility]    HX ORTHOPAEDIC  2010    right shoulder    HX ORTHOPAEDIC  2009    foot surgery d/t arthritis    HX WISDOM TEETH EXTRACTION      many yrs ago    [address] [place] [address]    calcium deposit taken out right breast       Family History:  No family history on file. Social History:  Social History     Tobacco Use    Smoking status: Former     Types: Cigarettes     Quit date:      Years since quittin.9    Smokeless tobacco: Never   Substance Use Topics    Alcohol use: Not Currently    Drug use: Never       Allergies: Allergies   Allergen Reactions    Bacitracin Rash    Jardiance [Empagliflozin] Other (comments)     Uti symptoms         Review of Systems   Review of Systems   Constitutional:  Positive for activity change, appetite change and fatigue. Negative for chills and fever. Respiratory:  Negative for cough and shortness of breath. Cardiovascular:  Negative for chest pain and leg swelling. Gastrointestinal:  Positive for diarrhea, nausea and vomiting. Negative for abdominal pain. Genitourinary: Negative. Musculoskeletal:  Negative for back pain and gait problem. Skin:  Negative for color change and rash. Neurological:  Negative for dizziness, weakness, light-headedness and headaches. All other systems reviewed and are negative. Physical Exam   Physical Exam  Vitals and nursing note reviewed. Constitutional:       General: She is not in acute distress. Appearance: Normal appearance. She is not ill-appearing or toxic-appearing. HENT:      Head: Normocephalic and atraumatic. Nose: Nose normal.      Mouth/Throat:      Mouth: Mucous membranes are dry. Eyes:      Extraocular Movements: Extraocular movements intact. Pupils: Pupils are equal, round, and reactive to light. Cardiovascular:      Rate and Rhythm: Regular rhythm. Tachycardia present. Heart sounds: No murmur heard. Pulmonary:      Effort: Pulmonary effort is normal. No respiratory distress. Breath sounds: Normal breath sounds. No wheezing. Abdominal:      General: There is no distension. Palpations: Abdomen is soft. Tenderness: There is no abdominal tenderness. There is no guarding or rebound. Musculoskeletal:         General: No swelling or tenderness. Normal range of motion. Cervical back: Normal range of motion and neck supple. Right lower leg: No edema. Left lower leg: No edema. Skin:     General: Skin is warm and dry. Coloration: Skin is not pale. Findings: No erythema. Neurological:      General: No focal deficit present. Mental Status: She is alert and oriented to person, place, and time.        Diagnostic Study Results     Labs -     Recent Results (from the past 12 hour(s))   GLUCOSE, POC    Collection Time: 11/29/22 10:03 AM   Result Value Ref Range    Glucose (POC) 236 (H) 70 - 110 mg/dL   TROPONIN-HIGH SENSITIVITY    Collection Time: 11/29/22 10:13 AM   Result Value Ref Range    Troponin-High Sensitivity 224 (HH) 0 - 54 ng/L   CBC WITH AUTOMATED DIFF    Collection Time: 11/29/22 10:15 AM   Result Value Ref Range    WBC 10.5 4.6 - 13.2 K/uL    RBC 5.04 4.20 - 5.30 M/uL    HGB 14.8 12.0 - 16.0 g/dL    HCT 43.6 35.0 - 45.0 %    MCV 86.5 78.0 - 100.0 FL    MCH 29.4 24.0 - 34.0 PG    MCHC 33.9 31.0 - 37.0 g/dL    RDW 14.1 11.6 - 14.5 %    PLATELET 849 469 - 426 K/uL    MPV 11.4 9.2 - 11.8 FL    NRBC 0.0 0  WBC    ABSOLUTE NRBC 0.00 0.00 - 0.01 K/uL    NEUTROPHILS 69 40 - 73 %    LYMPHOCYTES 15 (L) 21 - 52 %    MONOCYTES 16 (H) 3 - 10 %    EOSINOPHILS 0 0 - 5 %    BASOPHILS 0 0 - 2 %    IMMATURE GRANULOCYTES 0 0.0 - 0.5 %    ABS. NEUTROPHILS 7.2 1.8 - 8.0 K/UL    ABS. LYMPHOCYTES 1.6 0.9 - 3.6 K/UL    ABS. MONOCYTES 1.7 (H) 0.05 - 1.2 K/UL    ABS. EOSINOPHILS 0.0 0.0 - 0.4 K/UL    ABS. BASOPHILS 0.0 0.0 - 0.1 K/UL    ABS. IMM. GRANS. 0.0 0.00 - 0.04 K/UL    DF AUTOMATED     METABOLIC PANEL, COMPREHENSIVE    Collection Time: 11/29/22 10:15 AM   Result Value Ref Range    Sodium 131 (L) 136 - 145 mmol/L    Potassium 4.4 3.5 - 5.5 mmol/L    Chloride 96 (L) 100 - 111 mmol/L    CO2 18 (L) 21 - 32 mmol/L    Anion gap 17 3.0 - 18 mmol/L    Glucose 270 (H) 74 - 99 mg/dL    BUN 52 (H) 7.0 - 18 MG/DL    Creatinine 3.11 (H) 0.6 - 1.3 MG/DL    BUN/Creatinine ratio 17 12 - 20      eGFR 15 (L) >60 ml/min/1.73m2    Calcium 9.0 8.5 - 10.1 MG/DL    Bilirubin, total 1.0 0.2 - 1.0 MG/DL    ALT (SGPT) 27 13 - 56 U/L    AST (SGOT) 48 (H) 10 - 38 U/L    Alk.  phosphatase 60 45 - 117 U/L    Protein, total 7.0 6.4 - 8.2 g/dL    Albumin 3.2 (L) 3.4 - 5.0 g/dL    Globulin 3.8 2.0 - 4.0 g/dL    A-G Ratio 0.8 0.8 - 1.7     NT-PRO BNP    Collection Time: 11/29/22 10:15 AM   Result Value Ref Range    NT pro-BNP 2,430 (H) 0 - 1,800 PG/ML   COVID-19 WITH INFLUENZA A/B    Collection Time: 11/29/22 10:45 AM   Result Value Ref Range    SARS-CoV-2 by PCR Not detected NOTD      Influenza A by PCR Detected (A) NOTD      Influenza B by PCR Not detected NOTD     EKG, 12 LEAD, INITIAL    Collection Time: 11/29/22 10:58 AM   Result Value Ref Range    Ventricular Rate 123 BPM    Atrial Rate 123 BPM    P-R Interval 164 ms    QRS Duration 66 ms    Q-T Interval 388 ms    QTC Calculation (Bezet) 555 ms    Calculated P Axis 31 degrees    Calculated R Axis 1 degrees    Calculated T Axis 56 degrees    Diagnosis        Critical Test Result: Long QTc  Sinus tachycardia  Low voltage QRS  Borderline ECG  When compared with ECG of 23-MAY-2022 10:44,  No significant change was found     POC LACTIC ACID    Collection Time: 11/29/22 11:27 AM   Result Value Ref Range    Lactic Acid (POC) 3.29 (HH) 0.40 - 2.00 mmol/L   URINALYSIS W/ RFLX MICROSCOPIC    Collection Time: 11/29/22  1:55 PM   Result Value Ref Range    Color DARK YELLOW      Appearance TURBID      Specific gravity 1.018 1.005 - 1.030      pH (UA) 5.0 5.0 - 8.0      Protein 300 (A) NEG mg/dL    Glucose Negative NEG mg/dL    Ketone TRACE (A) NEG mg/dL    Bilirubin MODERATE (A) NEG      Blood LARGE (A) NEG      Urobilinogen 1.0 0.2 - 1.0 EU/dL    Nitrites Positive (A) NEG      Leukocyte Esterase LARGE (A) NEG     URINE MICROSCOPIC ONLY    Collection Time: 11/29/22  1:55 PM   Result Value Ref Range    WBC TOO NUMEROUS TO COUNT 0 - 5 /hpf    RBC 21 to 35 0 - 5 /hpf    Epithelial cells 2+ 0 - 5 /lpf    Bacteria 3+ (A) NEG /hpf    Granular cast 4 to 10 NEG /lpf       Radiologic Studies -   NM LUNG SCAN PERF   Final Result      No pulmonary embolism. Perfusion only scintigraphy was performed and correlated with radiographic   evaluation of the chest. This exam is reported using a perfusion only modified   PIOPED II interpretive criteria. XR CHEST PORT   Final Result      No significant abnormality. No new abnormality is seen developing since last   study. DUPLEX LOWER EXT VENOUS BILAT   Final Result      CT CHEST ABD PELV WO CONT    (Results Pending)     CT Results  (Last 48 hours)      None          CXR Results  (Last 48 hours)                 11/29/22 1340  XR CHEST PORT Final result    Impression:      No significant abnormality. No new abnormality is seen developing since last   study. Narrative:  A portable AP radiograph of the chest was obtained at 1334 hours:   INDICATION:  Nausea, vomiting, sepsis. COMPARISON:  Portable chest 4/21/2010. FINDINGS:        Heart and mediastinum: Unremarkable.    Lungs and pleura: Previously noted infiltrates have resolved with no new   infiltrates seen. Lungs are hypoinflated. Aorta: Aortic arch partially calcified. Bones: Within normal limits for age. Other: None. Medical Decision Making   I am the first provider for this patient. I reviewed the vital signs, available nursing notes, past medical history, past surgical history, family history and social history. Vital Signs-Reviewed the patient's vital signs. Patient Vitals for the past 12 hrs:   Temp Pulse Resp BP SpO2   11/29/22 1337 -- (!) 120 18 (!) 149/65 --   11/29/22 1306 -- (!) 119 21 139/75 97 %   11/29/22 1212 -- (!) 119 23 (!) 141/65 98 %   11/29/22 1134 -- (!) 118 22 118/64 98 %   11/29/22 1005 97.1 °F (36.2 °C) (!) 132 18 (!) 85/60 95 %       Records Reviewed: Nursing Notes    Provider Notes (Medical Decision Making):   80-year-old female presenting to the emergency department with nausea, vomiting, fatigue, generalized weakness. She is noted to be tachycardic and hypotensive. Patient moved into a high acuity resuscitation room, also noted to have borderline low O2 sats 89 to 92% on room air. She has no complaints of chest pain or shortness of breath, however given recent travel I have high concern for PE. Also consider viral syndrome, dehydration, JOHNATHAN, electrolyte abnormality, dissection, ACS. I will evaluate with blood work, urinalysis, viral swabs, treat with 2 L IV fluid bolus, frequent reassessment. ED Course as of 11/29/22 1642 Tue Nov 29, 2022   1135 EKG per my interpretation sinus tachycardia, rate 123 bpm, normal axis, no acute ischemic changes or interval changes. [AK]   0903 Patient noted to have signs of cardiac strain with elevated proBNP and high-sensitivity troponin, concern for endorgan damage secondary to septic shock versus PE versus severe dehydration.   She has JOHNATHAN with creatinine 3.5 up from baseline 0.7, I discussed with radiology and we will obtain lower extremity DVT ultrasound and V/Q perfusion instead of CTPA. [AK]      ED Course User Index  [AK] Indra Pabon MD       Medications Administered       sodium chloride 0.9 % bolus infusion 1,000 mL       Admin Date  11/29/2022 Action  New Bag Dose  1,000 mL Rate  1,000 mL/hr Route  IntraVENous Administered By  Ashlyn Sutton Date  11/29/2022 Action  New Bag Dose  1,000 mL Rate  1,000 mL/hr Route  IntraVENous Administered By  Lore Carolina                        Cardiac Monitoring: The cardiac monitor revealed the following rhythm as interpreted by me: Sinus tachycardia, rate 120 bpm  The cardiac monitor was ordered secondary to the patient's reported complaint of weakness and to monitor the patient for dysrhythmia. Duncan Leiva MD      ED Course:   Initial assessment performed. The patients presenting problems have been discussed, and they are in agreement with the care plan formulated and outlined with them. I have encouraged them to ask questions as they arise throughout their visit. Admission Note:  Patient is being admitted to the hospital by Dr. Vanna Neely, Service: Hospitalist.  The results of their tests and reasons for their admission have been discussed with them and available family. They convey agreement and understanding for the need to be admitted and for their admission diagnosis. Critical Care Documentation  I have spent 55 minutes of critical care time in evaluating and treating this patient. This includes time spent at bedside, time with family and decision makers, documentation, review of labs and imaging, and/or consultation with specialists. It does not include time spent on separately billed procedures. This patient presents with a critical illness or injury that acutely impairs one or more vital organ systems such that there is a high probability of imminent or life threatening deterioration in the patient's condition.   This case involved decision making of high complexity to assess, manipulate, and support vital organ system failure and/or to prevent further life threatening deterioration of the patient's condition. Failure to initiate these interventions on an urgent basis would likely result in sudden, clinically significant or life threatening deterioration in the patient's condition. This case required my direct attention, intervention, and personal management for the time documented above. This time does not include separately billed interventions/procedures which are separately documented. Abnormal findings supporting critical care: Severe dehydration secondary to nausea, vomiting, diarrhea, and poor p.o. intake resulting in JOHNATHAN and electrolyte abnormality resulting in hypotension and tachycardia  Interventions to support critical care: Administration of 2 L IV fluid bolus, frequent reassessment of hypotension and tachycardia. Failure to intervene may result in: Worsening hypotension, hemodynamic instability and collapse, worsening JOHNATHAN, electrolyte abnormality, arrhythmia, death  Merry Rice MD      Disposition:  admit    Diagnosis     Clinical Impression:   1. Severe dehydration    2. Influenza A    3. Hypotension due to hypovolemia    4. JOHNATHAN (acute kidney injury) ([facility] [address]        Attestations:  I am the first and primary provider of record for this patient's ED encounter. I personally performed the services described above in this documentation. Merry Rice MD    Please note that this dictation was completed with [employer], the computer voice recognition software. Quite often unanticipated grammatical, syntax, homophones, and other interpretive errors are inadvertently transcribed by the computer software. Please disregard these errors. Please excuse any errors that have escaped final proofreading. Thank you.

## 2022-11-29 NOTE — ED TRIAGE NOTES
Pt arrive to ed with c/o dehydration, nausea and vomiting. HX of type two diabetes; having been taking insulin sine Saturday. Pt report that she forget to take medication because she went on a car trip. Concern for high BGL. FSBS in triage was 236.

## 2022-11-29 NOTE — PROGRESS NOTES
Pharmacy Dosing Services:    Pharmacist Renal Dosing Progress Note for Errol Jaquez   Physician : Dr Rayna Schofield    The following medication: Zosyn  was automatically dose-adjusted per [facility] P&T Committee Protocol, with respect to renal function. Consult provided for this   68 y.o. , female , for the indication of UTI. Pt Weight:   Wt Readings from Last 1 Encounters:   11/29/22 83.9 kg (185 lb)         Previous Regimen Zosyn 3.375 gm Q8H   Serum Creatinine Lab Results   Component Value Date/Time    Creatinine 3.11 (H) 11/29/2022 10:15 AM       Creatinine Clearance Estimated Creatinine Clearance: 16.4 mL/min (A) (based on SCr of 3.11 mg/dL (H)). BUN Lab Results   Component Value Date/Time    BUN 52 (H) 11/29/2022 10:15 AM         Dosage changed to:  Zosyn 3.375 gm Q12H    Additional notes:      Pharmacy to continue to monitor patient daily. Will make dosage adjustments based upon changing renal function. Signed Jazzy Tobar RPH.  Contact information:[phone]

## 2022-11-30 ENCOUNTER — APPOINTMENT (OUTPATIENT)
Dept: ULTRASOUND IMAGING | Age: 77
DRG: 683 | End: 2022-11-30
Attending: HOSPITALIST
Payer: MEDICARE

## 2022-11-30 ENCOUNTER — APPOINTMENT (OUTPATIENT)
Dept: NON INVASIVE DIAGNOSTICS | Age: 77
DRG: 683 | End: 2022-11-30
Attending: HOSPITALIST
Payer: MEDICARE

## 2022-11-30 PROBLEM — K52.9 COLITIS: Status: ACTIVE | Noted: 2022-11-30

## 2022-11-30 LAB
ANION GAP SERPL CALC-SCNC: 14 MMOL/L (ref 3–18)
BASOPHILS # BLD: 0 K/UL (ref 0–0.1)
BASOPHILS NFR BLD: 0 % (ref 0–2)
BUN SERPL-MCNC: 56 MG/DL (ref 7–18)
BUN/CREAT SERPL: 19 (ref 12–20)
CALCIUM SERPL-MCNC: 8.4 MG/DL (ref 8.5–10.1)
CHLORIDE SERPL-SCNC: 103 MMOL/L (ref 100–111)
CO2 SERPL-SCNC: 19 MMOL/L (ref 21–32)
CREAT SERPL-MCNC: 3.02 MG/DL (ref 0.6–1.3)
CREAT UR-MCNC: 36 MG/DL (ref 30–125)
DIFFERENTIAL METHOD BLD: ABNORMAL
ECHO AO ROOT DIAM: 3 CM
ECHO AO ROOT INDEX: 1.57 CM/M2
ECHO AV AREA PEAK VELOCITY: 2.1 CM2
ECHO AV AREA VTI: 2.5 CM2
ECHO AV AREA/BSA PEAK VELOCITY: 1.1 CM2/M2
ECHO AV AREA/BSA VTI: 1.3 CM2/M2
ECHO AV MEAN GRADIENT: 4 MMHG
ECHO AV MEAN VELOCITY: 1 M/S
ECHO AV PEAK GRADIENT: 7 MMHG
ECHO AV PEAK VELOCITY: 1.3 M/S
ECHO AV VELOCITY RATIO: 0.77
ECHO AV VTI: 23 CM
ECHO LA DIAMETER INDEX: 1.57 CM/M2
ECHO LA DIAMETER: 3 CM
ECHO LA TO AORTIC ROOT RATIO: 1
ECHO LA VOL 2C: 24 ML (ref 22–52)
ECHO LA VOL 4C: 45 ML (ref 22–52)
ECHO LA VOL BP: 39 ML (ref 22–52)
ECHO LA VOL/BSA BIPLANE: 20 ML/M2 (ref 16–34)
ECHO LA VOLUME AREA LENGTH: 43 ML
ECHO LA VOLUME INDEX A2C: 13 ML/M2 (ref 16–34)
ECHO LA VOLUME INDEX A4C: 24 ML/M2 (ref 16–34)
ECHO LA VOLUME INDEX AREA LENGTH: 23 ML/M2 (ref 16–34)
ECHO LV E' LATERAL VELOCITY: 8 CM/S
ECHO LV E' SEPTAL VELOCITY: 5 CM/S
ECHO LV EDV A2C: 59 ML
ECHO LV EDV A4C: 62 ML
ECHO LV EDV BP: 61 ML (ref 56–104)
ECHO LV EDV INDEX A4C: 32 ML/M2
ECHO LV EDV INDEX BP: 32 ML/M2
ECHO LV EDV NDEX A2C: 31 ML/M2
ECHO LV EJECTION FRACTION A2C: 72 %
ECHO LV EJECTION FRACTION A4C: 62 %
ECHO LV EJECTION FRACTION BIPLANE: 66 % (ref 55–100)
ECHO LV ESV A2C: 16 ML
ECHO LV ESV A4C: 23 ML
ECHO LV ESV BP: 21 ML (ref 19–49)
ECHO LV ESV INDEX A2C: 8 ML/M2
ECHO LV ESV INDEX A4C: 12 ML/M2
ECHO LV ESV INDEX BP: 11 ML/M2
ECHO LV FRACTIONAL SHORTENING: 27 % (ref 28–44)
ECHO LV INTERNAL DIMENSION DIASTOLE INDEX: 1.57 CM/M2
ECHO LV INTERNAL DIMENSION DIASTOLIC: 3 CM (ref 3.9–5.3)
ECHO LV INTERNAL DIMENSION SYSTOLIC INDEX: 1.15 CM/M2
ECHO LV INTERNAL DIMENSION SYSTOLIC: 2.2 CM
ECHO LV IVSD: 1.2 CM (ref 0.6–0.9)
ECHO LV MASS 2D: 109.1 G (ref 67–162)
ECHO LV MASS INDEX 2D: 57.1 G/M2 (ref 43–95)
ECHO LV POSTERIOR WALL DIASTOLIC: 1.2 CM (ref 0.6–0.9)
ECHO LV RELATIVE WALL THICKNESS RATIO: 0.8
ECHO LVOT AREA: 2.8 CM2
ECHO LVOT AV VTI INDEX: 0.85
ECHO LVOT DIAM: 1.9 CM
ECHO LVOT MEAN GRADIENT: 2 MMHG
ECHO LVOT PEAK GRADIENT: 4 MMHG
ECHO LVOT PEAK VELOCITY: 1 M/S
ECHO LVOT STROKE VOLUME INDEX: 29.1 ML/M2
ECHO LVOT SV: 55.5 ML
ECHO LVOT VTI: 19.6 CM
ECHO MV A VELOCITY: 1.19 M/S
ECHO MV E DECELERATION TIME (DT): 138 MS
ECHO MV E VELOCITY: 0.74 M/S
ECHO MV E/A RATIO: 0.62
ECHO MV E/E' LATERAL: 9.25
ECHO MV E/E' RATIO (AVERAGED): 12.03
ECHO MV E/E' SEPTAL: 14.8
ECHO PV ACCELERATION TIME (AT): 45.7 MS
ECHO PV MAX VELOCITY: 1 M/S
ECHO PV PEAK GRADIENT: 4 MMHG
ECHO RV FREE WALL PEAK S': 19 CM/S
EOSINOPHIL # BLD: 0 K/UL (ref 0–0.4)
EOSINOPHIL NFR BLD: 0 % (ref 0–5)
ERYTHROCYTE [DISTWIDTH] IN BLOOD BY AUTOMATED COUNT: 14.2 % (ref 11.6–14.5)
GLUCOSE BLD STRIP.AUTO-MCNC: 107 MG/DL (ref 70–110)
GLUCOSE BLD STRIP.AUTO-MCNC: 120 MG/DL (ref 70–110)
GLUCOSE BLD STRIP.AUTO-MCNC: 122 MG/DL (ref 70–110)
GLUCOSE BLD STRIP.AUTO-MCNC: 134 MG/DL (ref 70–110)
GLUCOSE SERPL-MCNC: 141 MG/DL (ref 74–99)
HCT VFR BLD AUTO: 35.7 % (ref 35–45)
HGB BLD-MCNC: 11.6 G/DL (ref 12–16)
IMM GRANULOCYTES # BLD AUTO: 0 K/UL
IMM GRANULOCYTES NFR BLD AUTO: 0 %
LYMPHOCYTES # BLD: 1.3 K/UL (ref 0.9–3.6)
LYMPHOCYTES NFR BLD: 20 % (ref 21–52)
MAGNESIUM SERPL-MCNC: 3.6 MG/DL (ref 1.6–2.6)
MCH RBC QN AUTO: 28.9 PG (ref 24–34)
MCHC RBC AUTO-ENTMCNC: 32.5 G/DL (ref 31–37)
MCV RBC AUTO: 89 FL (ref 78–100)
METAMYELOCYTES NFR BLD MANUAL: 1 %
MONOCYTES # BLD: 0.9 K/UL (ref 0.05–1.2)
MONOCYTES NFR BLD: 14 % (ref 3–10)
NEUTS BAND NFR BLD MANUAL: 6 % (ref 0–5)
NEUTS SEG # BLD: 4.3 K/UL (ref 1.8–8)
NEUTS SEG NFR BLD: 59 % (ref 40–73)
NRBC # BLD: 0 K/UL (ref 0–0.01)
NRBC BLD-RTO: 0 PER 100 WBC
PLATELET # BLD AUTO: 158 K/UL (ref 135–420)
PLATELET COMMENTS,PCOM: ABNORMAL
PMV BLD AUTO: 10.8 FL (ref 9.2–11.8)
POTASSIUM SERPL-SCNC: 3.9 MMOL/L (ref 3.5–5.5)
PROT UR-MCNC: 132 MG/DL
PROT/CREAT UR-RTO: 3.7
RBC # BLD AUTO: 4.01 M/UL (ref 4.2–5.3)
RBC MORPH BLD: ABNORMAL
SODIUM SERPL-SCNC: 136 MMOL/L (ref 136–145)
TROPONIN-HIGH SENSITIVITY: 97 NG/L (ref 0–54)
WBC # BLD AUTO: 6.6 K/UL (ref 4.6–13.2)

## 2022-11-30 PROCEDURE — 74011250636 HC RX REV CODE- 250/636: Performed by: HOSPITALIST

## 2022-11-30 PROCEDURE — 74011250637 HC RX REV CODE- 250/637: Performed by: INTERNAL MEDICINE

## 2022-11-30 PROCEDURE — 80048 BASIC METABOLIC PNL TOTAL CA: CPT

## 2022-11-30 PROCEDURE — 65270000046 HC RM TELEMETRY

## 2022-11-30 PROCEDURE — 74011250637 HC RX REV CODE- 250/637: Performed by: HOSPITALIST

## 2022-11-30 PROCEDURE — 97165 OT EVAL LOW COMPLEX 30 MIN: CPT

## 2022-11-30 PROCEDURE — 84484 ASSAY OF TROPONIN QUANT: CPT

## 2022-11-30 PROCEDURE — 82962 GLUCOSE BLOOD TEST: CPT

## 2022-11-30 PROCEDURE — 74011250637 HC RX REV CODE- 250/637: Performed by: EMERGENCY MEDICINE

## 2022-11-30 PROCEDURE — 36415 COLL VENOUS BLD VENIPUNCTURE: CPT

## 2022-11-30 PROCEDURE — 97116 GAIT TRAINING THERAPY: CPT

## 2022-11-30 PROCEDURE — 83735 ASSAY OF MAGNESIUM: CPT

## 2022-11-30 PROCEDURE — 85025 COMPLETE CBC W/AUTO DIFF WBC: CPT

## 2022-11-30 PROCEDURE — 93306 TTE W/DOPPLER COMPLETE: CPT

## 2022-11-30 PROCEDURE — 74011000258 HC RX REV CODE- 258: Performed by: HOSPITALIST

## 2022-11-30 PROCEDURE — 74011000250 HC RX REV CODE- 250: Performed by: HOSPITALIST

## 2022-11-30 PROCEDURE — 97162 PT EVAL MOD COMPLEX 30 MIN: CPT

## 2022-11-30 PROCEDURE — 76770 US EXAM ABDO BACK WALL COMP: CPT

## 2022-11-30 PROCEDURE — 84156 ASSAY OF PROTEIN URINE: CPT

## 2022-11-30 PROCEDURE — 84300 ASSAY OF URINE SODIUM: CPT

## 2022-11-30 RX ORDER — ROPINIROLE 0.25 MG/1
0.5 TABLET, FILM COATED ORAL DAILY
Status: DISCONTINUED | OUTPATIENT
Start: 2022-11-30 | End: 2022-12-04 | Stop reason: HOSPADM

## 2022-11-30 RX ADMIN — PIPERACILLIN AND TAZOBACTAM 3.38 G: 3; .375 INJECTION, POWDER, FOR SOLUTION INTRAVENOUS at 04:52

## 2022-11-30 RX ADMIN — SODIUM CHLORIDE 75 ML/HR: 9 INJECTION, SOLUTION INTRAVENOUS at 17:56

## 2022-11-30 RX ADMIN — SODIUM CHLORIDE, PRESERVATIVE FREE 10 ML: 5 INJECTION INTRAVENOUS at 15:54

## 2022-11-30 RX ADMIN — SODIUM CHLORIDE, PRESERVATIVE FREE 10 ML: 5 INJECTION INTRAVENOUS at 05:05

## 2022-11-30 RX ADMIN — ROPINIROLE HYDROCHLORIDE 0.5 MG: 0.25 TABLET, FILM COATED ORAL at 22:45

## 2022-11-30 RX ADMIN — ASPIRIN 81 MG: 81 TABLET, CHEWABLE ORAL at 11:01

## 2022-11-30 RX ADMIN — HEPARIN SODIUM 5000 UNITS: 5000 INJECTION INTRAVENOUS; SUBCUTANEOUS at 11:01

## 2022-11-30 RX ADMIN — HEPARIN SODIUM 5000 UNITS: 5000 INJECTION INTRAVENOUS; SUBCUTANEOUS at 01:15

## 2022-11-30 RX ADMIN — PANTOPRAZOLE SODIUM 40 MG: 40 TABLET, DELAYED RELEASE ORAL at 06:47

## 2022-11-30 RX ADMIN — PIPERACILLIN AND TAZOBACTAM 2.25 G: 2; .25 INJECTION, POWDER, FOR SOLUTION INTRAVENOUS at 15:54

## 2022-11-30 RX ADMIN — OSELTAMIVIR PHOSPHATE 30 MG: 30 CAPSULE ORAL at 11:19

## 2022-11-30 RX ADMIN — ROSUVASTATIN CALCIUM 10 MG: 10 TABLET, COATED ORAL at 00:18

## 2022-11-30 RX ADMIN — ROSUVASTATIN CALCIUM 10 MG: 10 TABLET, COATED ORAL at 22:12

## 2022-11-30 RX ADMIN — PIPERACILLIN AND TAZOBACTAM 2.25 G: 2; .25 INJECTION, POWDER, FOR SOLUTION INTRAVENOUS at 22:11

## 2022-11-30 RX ADMIN — ACETAMINOPHEN 650 MG: 325 TABLET ORAL at 22:12

## 2022-11-30 RX ADMIN — SODIUM CHLORIDE 75 ML/HR: 9 INJECTION, SOLUTION INTRAVENOUS at 05:17

## 2022-11-30 RX ADMIN — ROPINIROLE HYDROCHLORIDE 0.5 MG: 0.25 TABLET, FILM COATED ORAL at 01:15

## 2022-11-30 RX ADMIN — HEPARIN SODIUM 5000 UNITS: 5000 INJECTION INTRAVENOUS; SUBCUTANEOUS at 17:47

## 2022-11-30 RX ADMIN — ONDANSETRON 4 MG: 2 INJECTION INTRAMUSCULAR; INTRAVENOUS at 11:19

## 2022-11-30 RX ADMIN — MONTELUKAST 10 MG: 10 TABLET, FILM COATED ORAL at 11:01

## 2022-11-30 RX ADMIN — ACETAMINOPHEN 650 MG: 325 TABLET ORAL at 01:11

## 2022-11-30 RX ADMIN — SODIUM CHLORIDE, PRESERVATIVE FREE 10 ML: 5 INJECTION INTRAVENOUS at 22:12

## 2022-11-30 RX ADMIN — AMLODIPINE BESYLATE 5 MG: 5 TABLET ORAL at 11:00

## 2022-11-30 NOTE — PROGRESS NOTES
Bedside and Verbal shift change report given to Hemal Jaimes (oncoming nurse) by Jacklyn Tidwell (offgoing nurse).  Report included the following information SBAR, Kardex, Intake/Output, MAR, Recent Results, and Cardiac Rhythm SR .

## 2022-11-30 NOTE — PROGRESS NOTES
Patient alert oriented x4, VSS. Patient stated she takes requip at 4pm and then nightly per med rec. Patient stated she would like a dose tonight. Dr Dashawn Leach notified of patient request. Kylee Madera on further orders.  Patient made aware

## 2022-11-30 NOTE — PROGRESS NOTES
D/c plan: Home w/ [facility]. Spouse to transport. CM spoke w/ pt's spouse: Sandra Goel, via phone. Confirmed information on the pt's face sheet. Pt lives at home w/ her spouse and is independent in all ADLs and IDLs at baseline with a cane. Confirmed pt has a working glucometer. Discussed HH at d/c. He is in agreement w/ HH for the pt at d/c. Discussed [facility] for [facility] at d/c. [facility] [address] [facility] [facility] [address] Pt's spouse will transport her home at d/c. Cm will continue to follow to assist w/ care transition needs. Pt is not on home O2. Pt will need to wean off O2 or have a 6 min walk test prior to d/c.     Reason for Admission:  Severe dehydration, hypotension, tachycardia, hypomagnesemia, elevated troponin, UTI, Flu, diarrhea, JOHNATHAN, DM, Hypertension. RUR Score:    15%                 Plan for utilizing home health:  Yes. [facility]. PCP: First and Last name:  Tona Brower MD     Name of Practice:    Are you a current patient: Yes/No:    Approximate date of last visit:    Can you participate in a virtual visit with your PCP:                     Current Advanced Directive/Advance Care Plan: Full Code      Healthcare Decision Maker:   Click here to complete [address] including selection of the Healthcare Decision Maker Relationship (ie \"Primary\")                             Transition of Care Plan:          Home w/ [address] Care Management Interventions  PCP Verified by CM: Yes (Dr. Anay Hoang )  Palliative Care Criteria Met (RRAT>21 & CHF Dx)?: No  Mode of Transport at Discharge: Other (see comment) (Spouse)  Transition of Care Consult (CM Consult): [address]: Yes  Discharge Durable Medical Equipment:  (TBD.  Pt ambulates w/ a cane at baseline.)  Physical Therapy Consult: Yes  Occupational Therapy Consult: Yes  Speech Therapy Consult: No  Support Systems: Spouse/Significant Other  Confirm Follow Up Transport: Family  The Plan for Transition of Care is Related to the Following Treatment Goals : Home w/ [address]  The Patient and/or Patient Representative was Provided with a Choice of Provider and Agrees with the Discharge Plan?: Yes  Name of the Patient Representative Who was Provided with a Choice of Provider and Agrees with the Discharge Plan: Spouse: Leno Khan  Freedom of Choice List was Provided with Basic Dialogue that Supports the Patient's Individualized Plan of Care/Goals, Treatment Preferences and Shares the Quality Data Associated with the Providers?: Yes ([address] )  Discharge Location  Patient Expects to be Discharged to[de-identified] Home with home health

## 2022-11-30 NOTE — ED NOTES
Called report Rajiv Gauthier on Tele. Verbalized understanding of report, all questions answered. Pt prepped for transfer upstairs.

## 2022-11-30 NOTE — PROGRESS NOTES
Hospitalist Progress Note-critical care note     Patient: Charu Mcelroy MRN: [id number]  CSN: [id number]    YOB: 1945  Age: 68 y.o.   Sex: female    DOA: 11/29/2022 LOS:  LOS: 1 day            Chief complaint: colitis , dehydration, elevated trop uti , dm     Assessment/Plan         Hospital Problems  Date Reviewed: 6/7/2022            Codes Class Noted POA    Colitis ICD-10-CM: K52.9  ICD-9-CM: 558.9  11/30/2022 Unknown        * (Principal) Severe dehydration ICD-10-CM: E86.0  ICD-9-CM: 276.51  11/29/2022 Unknown        Hypotension ICD-10-CM: I95.9  ICD-9-CM: 458.9  11/29/2022 Unknown        Tachycardia ICD-10-CM: R00.0  ICD-9-CM: 785.0  11/29/2022 Unknown        Hypomagnesemia ICD-10-CM: E83.42  ICD-9-CM: 275.2  11/29/2022 Unknown        Elevated troponin ICD-10-CM: R77.8  ICD-9-CM: 790.6  11/29/2022 Unknown        UTI (urinary tract infection) ICD-10-CM: N39.0  ICD-9-CM: 599.0  11/29/2022 Unknown        Flu ICD-10-CM: J11.1  ICD-9-CM: 487.1  11/29/2022 Unknown        Diarrhea ICD-10-CM: R19.7  ICD-9-CM: 787.91  11/29/2022 Unknown        Dehydration ICD-10-CM: E86.0  ICD-9-CM: 276.51  11/19/2021 Yes        Hyponatremia ICD-10-CM: E87.1  ICD-9-CM: 276.1  11/19/2021 Yes        JOHNATHAN (acute kidney injury) ([facility] [address] ICD-10-CM: N17.9  ICD-9-CM: 584.9  11/19/2021 Yes        Diabetes ([facility] ICD-10-CM: E11.9  ICD-9-CM: 250.00  Unknown Yes        Hypertension ICD-10-CM: I10  ICD-9-CM: 401.9  Unknown Yes            Hypotension due to severe dehydration-resolved        Flu A  Tamiflu renal dose, droplet isolation, supportive treatment, monitor complication from flu     JOHNATHAN-cr still elevated , will have renal on board, obtain renal ultrasound   Like due to dehydration, continue normal saline infusion, renal dose medication, avoid NSAIDs and IV contrast     Nausea vomiting diarrhea  Will have CT abdomen without contrast  Collect stool sample for CDiff and bacterial panel  IV hydration symptoms treatment     UTI and colitiis   Continue zosyn      Hyponatremia, and hypomagnesemia-resolved   Continue monitoring     Severe dehydration  Due to GI loss, normal saline infusion     Tachycardia and elevated troponin  Tsh wnl, no chest pain, due to risk factors for cad -htn and dm-will have cardiologist on board   PVL no DVT, VQ scan negative for PE     Lactic acidosis \like due to dehydration -resolved         DM type II ,   -long term insulin at home   -will hold lantus , no appetite    Subjective doing a little bit better , no appetite , no diarrhea        Disposition :tbd,   Review of systems:    General: No fevers or chills. No appetite    Cardiovascular: No chest pain or pressure. No palpitations. Pulmonary: No shortness of breath. Gastrointestinal: No nausea, vomiting. Vital signs/Intake and Output:  Visit Vitals  BP (!) 111/48   Pulse (!) 104   Temp 96.9 °F (36.1 °C)   Resp 16   Ht 5' 5\" (1.651 m)   Wt 83.9 kg (185 lb)   SpO2 95%   Breastfeeding No   BMI 30.79 kg/m²     Current Shift:  No intake/output data recorded. Last three shifts:  11/28 1901 - 11/30 0700  In: 650 [I.V.:650]  Out: -     Physical Exam:  General: WD, WN. Alert, cooperative, no acute distress    HEENT: NC, Atraumatic. PERRLA, anicteric sclerae. Lungs: CTA Bilaterally. No Wheezing/Rhonchi/Rales. Heart:  Tachy,   No murmur, No Rubs, No Gallops  Abdomen: Soft, Non distended, Non tender. +Bowel sounds,   Extremities: No c/c/e  Psych:   Not anxious or agitated. Neurologic:  No acute neurological deficit.              Labs: Results:       Chemistry Recent Labs     11/30/22 0437 11/29/22  1015   * 270*    131*   K 3.9 4.4    96*   CO2 19* 18*   BUN 56* 52*   CREA 3.02* 3.11*   CA 8.4* 9.0   AGAP 14 17   BUCR 19 17   AP  --  60   TP  --  7.0   ALB  --  3.2*   GLOB  --  3.8   AGRAT  --  0.8      CBC w/Diff Recent Labs     11/30/22  0437 11/29/22  1015   WBC 6.6 10.5   RBC 4.01* 5.04   HGB 11.6* 14.8   HCT 35.7 43.6    178 GRANS 59 69   LYMPH 20* 15*   EOS 0 0      Cardiac Enzymes No results for input(s): CPK, CKND1, KODAK in the last 72 hours. No lab exists for component: CKRMB, TROIP   Coagulation No results for input(s): PTP, INR, APTT, INREXT in the last 72 hours. Lipid Panel Lab Results   Component Value Date/Time    Cholesterol, total 118 04/03/2010 06:10 AM    HDL Cholesterol 20 (L) 04/03/2010 06:10 AM    LDL, calculated 73 04/03/2010 06:10 AM    VLDL, calculated 25 04/03/2010 06:10 AM    Triglyceride 125 04/03/2010 06:10 AM    CHOL/HDL Ratio 5.9 (H) 04/03/2010 06:10 AM      BNP No results for input(s): BNPP in the last 72 hours. Liver Enzymes Recent Labs     11/29/22  1015   TP 7.0   ALB 3.2*   AP 60      Thyroid Studies Lab Results   Component Value Date/Time    TSH 0.83 04/17/2010 06:10 PM        Procedures/imaging: see electronic medical records for all procedures/Xrays and details which were not copied into this note but were reviewed prior to creation of Plan    NM LUNG SCAN PERF    Result Date: 11/29/2022  EXAM: NUCLEAR MEDICINE PULMONARY PERFUSION SCAN CLINICAL INDICATION/HISTORY: High clinical suspicion for pulmonary embolism. Decreased oxygen saturation and recent travel. COMPARISON: None Available TECHNIQUE: No aerosolized 99mTc-DTPA ventilatory images of the lungs were obtained. After intravenous administration of 7.0 mCi 99mTc-MAA, perfusion images of the lungs were obtained in multiple projections. Images are correlated with portable radiograph dated 11/29/2022.    > Injection site:  Left antecubital fossa ________________________________ FINDINGS: Perfusion images show symmetric radiotracer distribution to both lungs, with no segmental perfusion defects to suggest the presence of pulmonary embolism. ________________________________     No pulmonary embolism.  Perfusion only scintigraphy was performed and correlated with radiographic evaluation of the chest. This exam is reported using a perfusion only modified PIOPED II interpretive criteria. CT CHEST ABD PELV WO CONT    Result Date: 11/29/2022  EXAM: CT CHEST, ABDOMEN AND PELVIS CLINICAL INDICATION/HISTORY: Shortness of breath. Diarrhea. COMPARISON: None TECHNIQUE: Axial CT imaging of the chest, abdomen, and pelvis was performed without intravenous contrast. Multiplanar reformats were generated. One or more dose reduction techniques were used on this CT: automated exposure control, adjustment of the mAs and/or kVp according to patient size, and iterative reconstruction techniques. The specific techniques used on this CT exam have been documented in the patient's electronic medical record. Digital Imaging and Communications in Medicine (DICOM) format image data are available to nonaffiliated external healthcare facilities or entities on a secure, media free, reciprocally searchable basis with patient authorization for at least a 12-month period after this study. ________________ FINDINGS: CHEST: LUNGS: Lung architecture appears normal. Mild bibasilar subsegmental atelectasis. No nodule/mass. PLEURA: Normal. AIRWAY: Normal. MEDIASTINUM: Normal heart size. No pericardial effusion. Severe coronary and aortic atherosclerotic calcifications. LYMPH NODES: No enlarged lymph nodes. OTHER: None. _______________ ABDOMEN/PELVIS: LIVER, BILIARY: Liver is normal. No biliary dilation. Cholecystectomy. SPLEEN: Normal. PANCREAS: Normal. ADRENALS: Normal. KIDNEYS/URETERS/BLADDER: Kidneys appear normal. Mild fat stranding around the urinary bladder. LYMPH NODES: No enlarged lymph nodes. GASTROINTESTINAL TRACT: Edematous wall thickening of the colon extending from the distal transverse through sigmoid colon with mild surrounding fat stranding. No evidence of obstruction. VASCULATURE: Severe atherosclerotic calcifications. PELVIC ORGANS: Hysterectomy BONES: No acute or aggressive osseous abnormalities identified. OTHER: None. _______________     1.  There is edematous wall thickening of the colon from the distal transverse through proximal sigmoid consistent with colitis. This may be infectious, inflammatory, or ischemic in etiology. 2. Mild fat stranding around urinary bladder, correlate for possible cystitis. XR CHEST PORT    Result Date: 11/29/2022  A portable AP radiograph of the chest was obtained at 1334 hours: INDICATION:  Nausea, vomiting, sepsis. COMPARISON:  Portable chest 4/21/2010. FINDINGS:  Heart and mediastinum: Unremarkable. Lungs and pleura: Previously noted infiltrates have resolved with no new infiltrates seen. Lungs are hypoinflated. Aorta: Aortic arch partially calcified. Bones: Within normal limits for age. Other: None. No significant abnormality. No new abnormality is seen developing since last study. DUPLEX LOWER EXT VENOUS BILAT    Result Date: 11/29/2022  · No evidence of deep vein thrombosis in the right lower extremity. · No evidence of deep vein thrombosis in the left lower extremity.         Kisha Tenorio MD

## 2022-11-30 NOTE — PROGRESS NOTES
Pharmacy Dosing Services: Renal Dosing    The following medication: Zosyn was automatically dose-adjusted per [facility] P&T Committee Protocol, with respect to renal function. Consult provided for this   68 y.o. , female , for the indication of UTI x 7 days. Pt Weight:   Wt Readings from Last 1 Encounters:   11/30/22 83.9 kg (185 lb)         Previous Regimen Zosyn 3.375 g IV 12h over 30 min   Serum Creatinine Lab Results   Component Value Date/Time    Creatinine 3.02 (H) 11/30/2022 04:37 AM       Creatinine Clearance Estimated Creatinine Clearance: 16.7 mL/min (A) (based on SCr of 3.02 mg/dL (H)). BUN Lab Results   Component Value Date/Time    BUN 56 (H) 11/30/2022 04:37 AM       Dosage changed to:  Zosyn 2.25 g IV q8h over 30 min    Pharmacy to continue to monitor patient daily. Will make dosage adjustments based upon changing renal function.   Signed Lenox Cogan, [address]

## 2022-11-30 NOTE — CONSULTS
RENAL CONSULT  2022    Patient:  Eliu Subramanian  :  1945  Gender:  female  MRN #:  [id number]    Consulting Physician:  Be Blandon DO,  Assessment:    )  Flu A,   ARF   Hyponatremia  Acidosis  UTI  DM, hx of THN  Hypomagnesemia    Plan:    Needs bladder scanner to rule postobsructive uropathy  Ins and out  Continue with IVF,   Renal function should improve. History of Present Illness:  Eliu Subramanian is a 68y.o. year old female with hx of HTN, DM has been admitted diarrhea, Nuease. Has been admitted with ARF and hyponatremia. She is positive Flu test.   PT had been on Lisinopril as out pt. Past Medical History:   Diagnosis Date    Arthritis     \"all over\"    Diabetes ([address]     Type II 20 yrs ago    Hypertension     Ill-defined condition     Retinitis pigmentosa     Past Surgical History:   Procedure Laterality Date    HX COLONOSCOPY      HX HEENT      cataracts 8-9 yrs ago    [facility]    HX ORTHOPAEDIC  2010    right shoulder    HX ORTHOPAEDIC  2009    foot surgery d/t arthritis    HX WISDOM TEETH EXTRACTION      many yrs ago    [address] [place] [address]    calcium deposit taken out right breast     No family history on file.   Allergies   Allergen Reactions    Bacitracin Rash    Jardiance [Empagliflozin] Other (comments)     Uti symptoms     Current Facility-Administered Medications   Medication Dose Route Frequency Provider Last Rate Last Admin    rOPINIRole (REQUIP) tablet 0.5 mg  0.5 mg Oral DAILY Yanelis Schaefer MD   0.5 mg at 22 0115    piperacillin-tazobactam (ZOSYN) 2.25 g in 0.9% sodium chloride (MBP/ADV) 50 mL MBP  2.25 g IntraVENous Q8H Gris Del Rosario  mL/hr at 22 1554 2.25 g at 22 1554    oseltamivir (TAMIFLU) capsule 30 mg  30 mg Oral DAILY Evan Beavers MD   30 mg at 22 1119    aspirin chewable tablet 81 mg  81 mg Oral DAILY Gris Del Rosario MD   81 mg at 22 1101    montelukast (SINGULAIR) tablet 10 mg  10 mg Oral DAILY Khushi Sanchez MD   10 mg at 11/30/22 1101    rosuvastatin (CRESTOR) tablet 10 mg  10 mg Oral QHS Khushi Sanchez MD   10 mg at 11/30/22 0018    sodium chloride (NS) flush 5-40 mL  5-40 mL IntraVENous Q8H Khushi Sanchez MD   10 mL at 11/30/22 1554    sodium chloride (NS) flush 5-40 mL  5-40 mL IntraVENous PRN Khushi Sanchez MD        acetaminophen (TYLENOL) tablet 650 mg  650 mg Oral Q6H PRN Khushi Sanchez MD   650 mg at 11/30/22 0111    Or    acetaminophen (TYLENOL) suppository 650 mg  650 mg Rectal Q6H PRN Khushi Sanchez MD        bisacodyL (DULCOLAX) suppository 10 mg  10 mg Rectal DAILY PRN Khushi Sanchez MD        promethazine (PHENERGAN) tablet 12.5 mg  12.5 mg Oral Q6H PRN Khushi Sanchez MD        Or    ondansetron [facility]) injection 4 mg  4 mg IntraVENous Q6H PRN Khushi Sanchez MD   4 mg at 11/30/22 1119    heparin (porcine) injection 5,000 Units  5,000 Units SubCUTAneous Q8H Khushi Sanchez MD   5,000 Units at 11/30/22 1101    pantoprazole (PROTONIX) tablet 40 mg  40 mg Oral ACB Khushi Sanchez MD   40 mg at 11/30/22 0647    0.9% sodium chloride infusion  75 mL/hr IntraVENous CONTINUOUS Khushi Sanchez MD 75 mL/hr at 11/30/22 0517 75 mL/hr at 11/30/22 0517    [Held by provider] insulin glargine (LANTUS) injection 10 Units  10 Units SubCUTAneous QHS Garfield Schaefer MD        insulin lispro (HUMALOG) injection   SubCUTAneous AC&HS Khushi Sanchez MD        glucose chewable tablet 16 g  4 Tablet Oral PRN Khushi Sanchez MD        glucagon (GLUCAGEN) injection 1 mg  1 mg IntraMUSCular PRN Khushi Sanchez MD        dextrose 10% infusion 0-250 mL  0-250 mL IntraVENous PRN Khushi Sanchez MD        hydrALAZINE (APRESOLINE) 20 mg/mL injection 10 mg  10 mg IntraVENous Q6H PRN Khushi Sanchez MD        amLODIPine (NORVASC) tablet 5 mg  5 mg Oral DAILY Khushi Sanchez MD   5 mg at 11/30/22 1100       Review of Symptoms:  Below symptoms negative if not in [place].   Consitutional Symptoms: Fever, weight loss, weight gain, fatigue  Eyes:  pain, sudden vision loss, blurry vision, double vision             bleeding nose, sore throat, hoarseness  GI: nausea, vomiting, diarrhea, pain, blood in stool  Pulmonary; Cough, Shortness of breath, Wheezing's  Cardiac: chest pain, palpitation  Musculoskeletal: difficulty walking, falls, pain over muscle, joint pain, weakness  : dysuria, blood in urine, pain with urination, difficulty voiding  Neurologia: dizziness, syncope, focal weakness, difficulty speaking  Integumentary: rash, redness, ulcer   Psychiatric:  Depression suicidal ideation    Objective:  Visit Vitals  /62 (BP 1 Location: Right upper arm, BP Patient Position: At rest)   Pulse (!) 106   Temp 98.3 °F (36.8 °C)   Resp 19   Ht 5' 5\" (1.651 m)   Wt 83.9 kg (185 lb)   SpO2 95%   Breastfeeding No   BMI 30.79 kg/m²         Well developed and groomed  Eyes: anicteric  No edema          Laboratory Data:  Lab Results   Component Value Date    BUN 56 (H) 11/30/2022    BUN 52 (H) 11/29/2022    BUN 13 05/23/2022     11/30/2022     (L) 11/29/2022     05/23/2022    CO2 19 (L) 11/30/2022    CO2 18 (L) 11/29/2022    CO2 30 05/23/2022     Lab Results   Component Value Date    WBC 6.6 11/30/2022    HGB 11.6 (L) 11/30/2022    HCT 35.7 11/30/2022     Imaging have been reviewed:  )NM LUNG SCAN PERF    Result Date: 11/29/2022  EXAM: NUCLEAR MEDICINE PULMONARY PERFUSION SCAN CLINICAL INDICATION/HISTORY: High clinical suspicion for pulmonary embolism. Decreased oxygen saturation and recent travel. COMPARISON: None Available TECHNIQUE: No aerosolized 99mTc-DTPA ventilatory images of the lungs were obtained. After intravenous administration of 7.0 mCi 99mTc-MAA, perfusion images of the lungs were obtained in multiple projections. Images are correlated with portable radiograph dated 11/29/2022.    > Injection site:  Left antecubital fossa  FINDINGS: Perfusion images show symmetric radiotracer distribution to both lungs, with no segmental perfusion defects to suggest the presence of pulmonary embolism. No pulmonary embolism. Perfusion only scintigraphy was performed and correlated with radiographic evaluation of the chest. This exam is reported using a perfusion only modified PIOPED II interpretive criteria. CT CHEST ABD PELV WO CONT    Result Date: 11/29/2022  EXAM: CT CHEST, ABDOMEN AND PELVIS CLINICAL INDICATION/HISTORY: Shortness of breath. Diarrhea. COMPARISON: None TECHNIQUE: Axial CT imaging of the chest, abdomen, and pelvis was performed without intravenous contrast. Multiplanar reformats were generated. One or more dose reduction techniques were used on this CT: automated exposure control, adjustment of the mAs and/or kVp according to patient size, and iterative reconstruction techniques. The specific techniques used on this CT exam have been documented in the patient's electronic medical record. Digital Imaging and Communications in Medicine (DICOM) format image data are available to nonaffiliated external healthcare facilities or entities on a secure, media free, reciprocally searchable basis with patient authorization for at least a 12-month period after this study. FINDINGS: CHEST: LUNGS: Lung architecture appears normal. Mild bibasilar subsegmental atelectasis. No nodule/mass. PLEURA: Normal. AIRWAY: Normal. MEDIASTINUM: Normal heart size. No pericardial effusion. Severe coronary and aortic atherosclerotic calcifications. LYMPH NODES: No enlarged lymph nodes. OTHER: None. ABDOMEN/PELVIS: LIVER, BILIARY: Liver is normal. No biliary dilation. Cholecystectomy. SPLEEN: Normal. PANCREAS: Normal. ADRENALS: Normal. KIDNEYS/URETERS/BLADDER: Kidneys appear normal. Mild fat stranding around the urinary bladder. LYMPH NODES: No enlarged lymph nodes. GASTROINTESTINAL TRACT: Edematous wall thickening of the colon extending from the distal transverse through sigmoid colon with mild surrounding fat stranding. No evidence of obstruction. VASCULATURE: Severe atherosclerotic calcifications.  PELVIC ORGANS: Hysterectomy BONES: No acute or aggressive osseous abnormalities identified. OTHER: None. 1. There is edematous wall thickening of the colon from the distal transverse through proximal sigmoid consistent with colitis. This may be infectious, inflammatory, or ischemic in etiology. 2. Mild fat stranding around urinary bladder, correlate for possible cystitis. US RETROPERITONEUM COMP    Result Date: 11/30/2022  HISTORY: -Provided with order: korey -Additional: None Technique: Sonographic evaluation of the kidneys and bladder was performed. This was a good visibility study. Comparison study: None. Findings: Right kidney : 11.8 cm. Focal lesions: None identified. Renal cortical thickness: Preserved. Renal cortical echogenicity: Slightly increased. Hydronephrosis: None identified sonographically. Renal calculi:  None identified sonographically. Left kidney : 12.5 cm. Focal lesions:  None identified. Renal cortical thickness: Preserved. Renal cortical echogenicity: Preserved. Hydronephrosis: None identified sonographically. Renal calculi:  None identified sonographically. Bladder: Unremarkable within limits of exam. Prevoid bladder volume = 433 cc. Postvoid bladder volume = 277 cc. Postvoid bladder residual = 64 %. Ureteric jets : Right not identified during examination. Left present eventually (after 6 minutes). 1. Mildly increased right renal cortical echogenicity which can reflect medical renal disease. 2. Moderate post void bladder residual.    XR CHEST PORT    Result Date: 11/29/2022  A portable AP radiograph of the chest was obtained at 1334 hours: INDICATION:  Nausea, vomiting, sepsis. COMPARISON:  Portable chest 4/21/2010. FINDINGS:  Heart and mediastinum: Unremarkable. Lungs and pleura: Previously noted infiltrates have resolved with no new infiltrates seen. Lungs are hypoinflated. Aorta: Aortic arch partially calcified. Bones: Within normal limits for age. Other: None. No significant abnormality.  No new abnormality is seen developing since last study. ECHO ADULT COMPLETE    Result Date: 11/30/2022    Left Ventricle: Normal left ventricular systolic function with a visually estimated EF of 60 - 65%. Left ventricle size is normal. Mildly increased wall thickness. Normal wall motion. Grade I diastolic dysfunction present with normal LV EF. Tricuspid Valve: Unable to assess RVSP due to inadequate or insignificant tricuspid regurgitation. DUPLEX LOWER EXT VENOUS BILAT    Result Date: 11/29/2022  · No evidence of deep vein thrombosis in the right lower extremity. · No evidence of deep vein thrombosis in the left lower extremity.       Total time spent 45 minutes      )Megan Aguilar DO,

## 2022-11-30 NOTE — PROGRESS NOTES
Physician Progress Note      Chio Hannon  CSN #:                  [id number]  :                       1945  ADMIT DATE:       2022 10:26 AM  DISCH DATE:  RESPONDING  PROVIDER #:        Sharonda MARQUEZ MD          QUERY TEXT:    Pt admitted with Influenza A. Pt noted to have Tachycardia with bandemia, hypotension, JOHNATHAN, UTI and elevated lactic acid level. If possible, please document in the progress notes and discharge summary if you are evaluating and /or treating any of the following: The medical record reflects the following:  Risk Factors: 69 yo female with PMH DM, HTN  Clinical Indicators: On admission , BP 85/60, bun/cr. 52/3.11, CBC with 6 bands  U/A with large leukocyte esterase, WBC TNTC, lactic acid 3.29  Treatment: IV fluid resuscitation, IV antibiotics, Tamiflu, serial labs, U/A with culture, blood cultures        Thank you for your time,    Norman Araiza BSN, RN, [address]  C: [phone]    [email or web address]  Options provided:  -- Sepsis, present on admission  -- Sepsis was ruled out  -- Other - I will add my own diagnosis  -- Disagree - Not applicable / Not valid  -- Disagree - Clinically unable to determine / Unknown  -- Refer to Clinical Documentation Reviewer    PROVIDER RESPONSE TEXT:    After further study, sepsis was ruled out for this patient.     Query created by: Pia Boss on 2022 11:59 AM      Electronically signed by:  Sharonda De Luna MD 2022 4:30 PM

## 2022-12-01 LAB
ANION GAP SERPL CALC-SCNC: 10 MMOL/L (ref 3–18)
BASOPHILS # BLD: 0 K/UL (ref 0–0.1)
BASOPHILS NFR BLD: 0 % (ref 0–2)
BUN SERPL-MCNC: 61 MG/DL (ref 7–18)
BUN/CREAT SERPL: 17 (ref 12–20)
CALCIUM SERPL-MCNC: 8.9 MG/DL (ref 8.5–10.1)
CHLORIDE SERPL-SCNC: 106 MMOL/L (ref 100–111)
CO2 SERPL-SCNC: 22 MMOL/L (ref 21–32)
CREAT SERPL-MCNC: 3.58 MG/DL (ref 0.6–1.3)
DIFFERENTIAL METHOD BLD: ABNORMAL
EOSINOPHIL # BLD: 0 K/UL (ref 0–0.4)
EOSINOPHIL NFR BLD: 0 % (ref 0–5)
ERYTHROCYTE [DISTWIDTH] IN BLOOD BY AUTOMATED COUNT: 14.7 % (ref 11.6–14.5)
GLUCOSE BLD STRIP.AUTO-MCNC: 100 MG/DL (ref 70–110)
GLUCOSE BLD STRIP.AUTO-MCNC: 104 MG/DL (ref 70–110)
GLUCOSE BLD STRIP.AUTO-MCNC: 153 MG/DL (ref 70–110)
GLUCOSE BLD STRIP.AUTO-MCNC: 164 MG/DL (ref 70–110)
GLUCOSE SERPL-MCNC: 109 MG/DL (ref 74–99)
HCT VFR BLD AUTO: 32.1 % (ref 35–45)
HGB BLD-MCNC: 10.3 G/DL (ref 12–16)
IMM GRANULOCYTES # BLD AUTO: 0 K/UL
IMM GRANULOCYTES NFR BLD AUTO: 0 %
LYMPHOCYTES # BLD: 1 K/UL (ref 0.9–3.6)
LYMPHOCYTES NFR BLD: 19 % (ref 21–52)
MAGNESIUM SERPL-MCNC: 3.3 MG/DL (ref 1.6–2.6)
MCH RBC QN AUTO: 29 PG (ref 24–34)
MCHC RBC AUTO-ENTMCNC: 32.1 G/DL (ref 31–37)
MCV RBC AUTO: 90.4 FL (ref 78–100)
MONOCYTES # BLD: 1.2 K/UL (ref 0.05–1.2)
MONOCYTES NFR BLD: 22 % (ref 3–10)
NEUTS BAND NFR BLD MANUAL: 1 % (ref 0–5)
NEUTS SEG # BLD: 3.3 K/UL (ref 1.8–8)
NEUTS SEG NFR BLD: 58 % (ref 40–73)
NRBC # BLD: 0 K/UL (ref 0–0.01)
NRBC BLD-RTO: 0 PER 100 WBC
PLATELET # BLD AUTO: 152 K/UL (ref 135–420)
PLATELET COMMENTS,PCOM: ABNORMAL
PMV BLD AUTO: 10.9 FL (ref 9.2–11.8)
POTASSIUM SERPL-SCNC: 4 MMOL/L (ref 3.5–5.5)
RBC # BLD AUTO: 3.55 M/UL (ref 4.2–5.3)
RBC MORPH BLD: ABNORMAL
RBC MORPH BLD: ABNORMAL
SODIUM SERPL-SCNC: 138 MMOL/L (ref 136–145)
SODIUM UR-SCNC: 70 MMOL/L (ref 20–110)
WBC # BLD AUTO: 5.5 K/UL (ref 4.6–13.2)

## 2022-12-01 PROCEDURE — 82962 GLUCOSE BLOOD TEST: CPT

## 2022-12-01 PROCEDURE — 36415 COLL VENOUS BLD VENIPUNCTURE: CPT

## 2022-12-01 PROCEDURE — 92610 EVALUATE SWALLOWING FUNCTION: CPT

## 2022-12-01 PROCEDURE — 83735 ASSAY OF MAGNESIUM: CPT

## 2022-12-01 PROCEDURE — 74011250636 HC RX REV CODE- 250/636: Performed by: HOSPITALIST

## 2022-12-01 PROCEDURE — 74011250637 HC RX REV CODE- 250/637: Performed by: HOSPITALIST

## 2022-12-01 PROCEDURE — 97530 THERAPEUTIC ACTIVITIES: CPT

## 2022-12-01 PROCEDURE — 74011250637 HC RX REV CODE- 250/637: Performed by: INTERNAL MEDICINE

## 2022-12-01 PROCEDURE — 74011250637 HC RX REV CODE- 250/637: Performed by: EMERGENCY MEDICINE

## 2022-12-01 PROCEDURE — 85025 COMPLETE CBC W/AUTO DIFF WBC: CPT

## 2022-12-01 PROCEDURE — 74011000258 HC RX REV CODE- 258: Performed by: HOSPITALIST

## 2022-12-01 PROCEDURE — 97116 GAIT TRAINING THERAPY: CPT

## 2022-12-01 PROCEDURE — 77010033678 HC OXYGEN DAILY

## 2022-12-01 PROCEDURE — 74011636637 HC RX REV CODE- 636/637: Performed by: HOSPITALIST

## 2022-12-01 PROCEDURE — 65270000046 HC RM TELEMETRY

## 2022-12-01 PROCEDURE — 51798 US URINE CAPACITY MEASURE: CPT

## 2022-12-01 PROCEDURE — 74011000250 HC RX REV CODE- 250: Performed by: HOSPITALIST

## 2022-12-01 PROCEDURE — 92526 ORAL FUNCTION THERAPY: CPT

## 2022-12-01 PROCEDURE — 80048 BASIC METABOLIC PNL TOTAL CA: CPT

## 2022-12-01 RX ADMIN — SODIUM CHLORIDE 75 ML/HR: 9 INJECTION, SOLUTION INTRAVENOUS at 03:10

## 2022-12-01 RX ADMIN — AMLODIPINE BESYLATE 5 MG: 5 TABLET ORAL at 10:47

## 2022-12-01 RX ADMIN — PANTOPRAZOLE SODIUM 40 MG: 40 TABLET, DELAYED RELEASE ORAL at 06:54

## 2022-12-01 RX ADMIN — ACETAMINOPHEN 650 MG: 325 TABLET ORAL at 21:34

## 2022-12-01 RX ADMIN — INSULIN LISPRO 2 UNITS: 100 INJECTION, SOLUTION INTRAVENOUS; SUBCUTANEOUS at 17:23

## 2022-12-01 RX ADMIN — SODIUM CHLORIDE, PRESERVATIVE FREE 10 ML: 5 INJECTION INTRAVENOUS at 06:54

## 2022-12-01 RX ADMIN — PIPERACILLIN AND TAZOBACTAM 2.25 G: 2; .25 INJECTION, POWDER, FOR SOLUTION INTRAVENOUS at 04:06

## 2022-12-01 RX ADMIN — HEPARIN SODIUM 5000 UNITS: 5000 INJECTION INTRAVENOUS; SUBCUTANEOUS at 17:24

## 2022-12-01 RX ADMIN — SODIUM CHLORIDE, PRESERVATIVE FREE 10 ML: 5 INJECTION INTRAVENOUS at 22:00

## 2022-12-01 RX ADMIN — SODIUM CHLORIDE, PRESERVATIVE FREE 10 ML: 5 INJECTION INTRAVENOUS at 13:35

## 2022-12-01 RX ADMIN — HEPARIN SODIUM 5000 UNITS: 5000 INJECTION INTRAVENOUS; SUBCUTANEOUS at 01:56

## 2022-12-01 RX ADMIN — MONTELUKAST 10 MG: 10 TABLET, FILM COATED ORAL at 10:47

## 2022-12-01 RX ADMIN — PIPERACILLIN AND TAZOBACTAM 2.25 G: 2; .25 INJECTION, POWDER, FOR SOLUTION INTRAVENOUS at 13:35

## 2022-12-01 RX ADMIN — ONDANSETRON 4 MG: 2 INJECTION INTRAMUSCULAR; INTRAVENOUS at 00:04

## 2022-12-01 RX ADMIN — ROSUVASTATIN CALCIUM 10 MG: 10 TABLET, COATED ORAL at 21:34

## 2022-12-01 RX ADMIN — HEPARIN SODIUM 5000 UNITS: 5000 INJECTION INTRAVENOUS; SUBCUTANEOUS at 10:47

## 2022-12-01 RX ADMIN — PIPERACILLIN AND TAZOBACTAM 2.25 G: 2; .25 INJECTION, POWDER, FOR SOLUTION INTRAVENOUS at 21:35

## 2022-12-01 RX ADMIN — ROPINIROLE HYDROCHLORIDE 0.5 MG: 0.25 TABLET, FILM COATED ORAL at 21:35

## 2022-12-01 RX ADMIN — OSELTAMIVIR PHOSPHATE 30 MG: 30 CAPSULE ORAL at 10:46

## 2022-12-01 RX ADMIN — ASPIRIN 81 MG: 81 TABLET, CHEWABLE ORAL at 10:47

## 2022-12-01 RX ADMIN — INSULIN LISPRO 2 UNITS: 100 INJECTION, SOLUTION INTRAVENOUS; SUBCUTANEOUS at 21:42

## 2022-12-01 NOTE — PROGRESS NOTES
2212 - Head to toe assessment completed at this time. Pt denies CP and SOB. Pt rated pain 4/10 and medicated per MAR. 20G Left AC IVF infusing as ordered. Midline to left arm saline locked. Pt assisted to use BSC and then back to bed. Pt bladder scanned and bladder scan revealed 98 cc of urine in bladder. Bed in lowest position. Call bell and personal belongings within reach. Will continue to monitor. 2315 - Urine sample collected and taken to lab.

## 2022-12-01 NOTE — PROGRESS NOTES
Problem: Mobility Impaired (Adult and Pediatric)  Goal: *Acute Goals and Plan of Care (Insert Text)  Description: Physical Therapy Goals   Initiated 11/30/2022 and to be accomplished within 7 day(s)  1. Patient will move from supine <> sit with mod I in prep for out of bed activity and change of position. 2.  Patient will perform sit<> stand with mod I with LRAD in prep for transfers/ambulation. 3.  Patient will transfer from bed <> chair with mod I with LRAD for time up in chair for completion of ADL activity. 4.  Patient will ambulate 100 feet with mod I/LRAD for improved functional mobility at discharge. 5.  Patient will ascend/descend 2 stairs with CGA for home re-entry as needed. Outcome: Progressing Towards Goal    PHYSICAL THERAPY EVALUATION    Patient: Yaneth Marley (50 y.o. female)  Date: 11/30/2022  Primary Diagnosis: Severe dehydration [E86.0]  Precautions: Fall, Other (comment) (Droplet -Influenza A)  PLOF: amb with SPC PTA    ASSESSMENT :  Based on the objective data described below, the patient is seen on telemetry unit in full PPE. Pt presents with decreased ROM/motor performance R hip flexor muscle group, grossly 3-/5 and with decr'd independence in functional mobility with regard to bed mobility, transfers, with decreased gait quality/balance and with decreased activity tolerance. Patient reports pain R thigh between buttock and knee9-10/10 during session, present x~1 yr.  Demonstrates transition to sit EOB with SBA/additional time, transfers to stand with CGA/RW and able to complete GT/RW/CGA ~28ft in room. Margie slow, antalgic with step to gt patter, incr'd stance time R LE. Pt left back supine in bed with all needs in reach, bed alarm engaged, Dr. Meghana Pandey present and nurse Sonya Williamson notified of above. Answered pt questions regarding PT and mobility.  Recommend HHPT for follow up physical therapy upon discharge to reach maximal level of independence/safety with functional mobility. Pt Education: Role of physical therapy in acute care setting, fall prevention and safety/technique during functional mobility tasks    Patient will benefit from skilled intervention to address the above impairments. Patient's rehabilitation potential is considered to be Fair  Factors which may influence rehabilitation potential include:   []         None noted  []         Mental ability/status  [x]         Medical condition  []         Home/family situation and support systems  []         Safety awareness  [x]         Pain tolerance/management  []         Other:      PLAN :  Recommendations and Planned Interventions:   [x]           Bed Mobility Training             []    Neuromuscular Re-Education  [x]           Transfer Training                   []    Orthotic/Prosthetic Training  [x]           Gait Training                          []    Modalities  [x]           Therapeutic Exercises           []    Edema Management/Control  [x]           Therapeutic Activities            []    Family Training/Education  [x]           Patient Education  []           Other (comment):    Frequency/Duration: Patient will be followed by physical therapy 1-2 times per day/4-7 days per week to address goals. Discharge Recommendations: Home Physical Therapy  Further Equipment Recommendations for Discharge:     AMPAC: 15/20    This AMPAC score should be considered in conjunction with interdisciplinary team recommendations to determine the most appropriate discharge setting. Patient's social support, diagnosis, medical stability, and prior level of function should also be taken into consideration. SUBJECTIVE:   Patient stated This leg is sore and weak, but the doctor said it had nothing to do with the procedure (urology procedure ~1 yr ago).     OBJECTIVE DATA SUMMARY:     Past Medical History:   Diagnosis Date    Arthritis     \"all over\"    Diabetes ([facility] [address]     Type II 20 yrs ago    Hypertension     Ill-defined condition     Retinitis pigmentosa     Past Surgical History:   Procedure Laterality Date    HX COLONOSCOPY      HX HEENT      cataracts 8-9 yrs ago    [address]    HX ORTHOPAEDIC  2010    right shoulder    HX ORTHOPAEDIC  2009    foot surgery d/t arthritis    HX WISDOM TEETH EXTRACTION      many yrs ago    [address] [place] [address]    calcium deposit taken out right breast     Barriers to Learning/Limitations: yes;  physical  Compensate with: Visual Cues, Verbal Cues, and Tactile Cues  Home Situation:  Home Situation  Home Environment: Private residence  # Steps to Enter: 2  Rails to Enter: No  One/Two Story Residence: One story  Living Alone: No  Support Systems: Spouse/Significant Other  Patient Expects to be Discharged to[de-identified] Home  Current DME Used/Available at Home: Cane, straight, Walker, rolling, Raised toilet seat  Tub or Shower Type: Shower  Critical Behavior:  Neurologic State: Alert; Appropriate for age  Orientation Level: Oriented X4  Cognition: Follows commands  Safety/Judgement: Awareness of environment; Fall prevention  Psychosocial  Patient Behaviors: Calm; Cooperative  Family  Behaviors: Calm;Supportive (spouse present initially; left at start of session)  Family  Behaviors: Calm;Supportive (spouse present initially; left at start of session)  Strength:    Strength: Generally decreased, functional (R hip flex 3-/5)  Tone & Sensation:   Sensation: Intact  Range Of Motion:  AROM: Generally decreased, functional (R hip flex ~50% of L)  Functional Mobility:  Bed Mobility:  Rolling: Stand-by assistance  Supine to Sit: Stand-by assistance;Supervision; Additional time  Sit to Supine: Supervision  Transfers:  Sit to Stand: Contact guard assistance  Stand to Sit: Contact guard assistance  Balance:   Sitting: Intact  Standing: With support; Impaired  Standing - Static: Good  Standing - Dynamic : Fair  Ambulation/Gait Training:  Distance (ft): 28 Feet (ft)  Assistive Device: Nerissa Will rolling  Ambulation - Level of Assistance: Contact guard assistance  Gait Description (WDL): Exceptions to WDL  Gait Abnormalities: Antalgic;Decreased step clearance; Step to gait  Stance: Right increased;Time;Weight shift  Speed/Margie: Slow;Delayed  Step Length: Left shortened;Right shortened  Interventions: Safety awareness training;Verbal cues  Pain:  Pain level pre-treatment: 9-10/10   Pain level post-treatment: 9-10/10   Pain Intervention(s) : Medication (see MAR); Rest, Ice, Repositioning  Response to intervention: Nurse notified, See doc flow  Activity Tolerance:   Fair   Please refer to the flowsheet for vital signs taken during this treatment. After treatment:   []         Patient left in no apparent distress sitting up in chair  [x]         Patient left in no apparent distress in bed  [x]         Call bell left within reach  [x]         Nursing notified-[facility] nurse  [x]         Dr Elif Clements present  [x]         Bed alarm activated  []         SCDs applied    COMMUNICATION/EDUCATION:   [x]         Role of Physical Therapy in the acute care setting. [x]         Fall prevention education was provided and the patient/caregiver indicated understanding. [x]         Patient/family have participated as able in goal setting and plan of care. [x]         Patient/family agree to work toward stated goals and plan of care. []         Patient understands intent and goals of therapy, but is neutral about his/her participation. []         Patient is unable to participate in goal setting/plan of care: ongoing with therapy staff.  []         Other:     Thank you for this referral.  Roel Alonzo, PT   Time Calculation: 29 mins      Eval Complexity: History: HIGH Complexity :3+ comorbidities / personal factors will impact the outcome/ POC Exam:MEDIUM Complexity : 3 Standardized tests and measures addressing body structure, function, activity limitation and / or participation in recreation  Presentation: MEDIUM Complexity : Evolving with changing characteristics  Clinical Decision Making:Medium Complexity    Overall Complexity:MEDIUM    [facility] AM-PAC® Basic Mobility Inpatient Short Form (6-Clicks) Version 2    How much HELP from another person does the patient currently need    (If the patient hasn't done an activity recently, how much help from another person do you think he/she would need if he/she tried?)   Total (Total A or Dep)   A Lot  (Mod to Max A)   A Little (Sup or Min A)   None (Mod I to I)   Turning from your back to your side while in a flat bed without using bedrails? [] 1 [] 2 [x] 3 [] 4   2. Moving from lying on your back to sitting on the side of a flat bed without using bedrails? [] 1 [] 2 [x] 3 [] 4   3. Moving to and from a bed to a chair (including a wheelchair)? [] 1 [] 2 [x] 3 [] 4   4. Standing up from a chair using your arms (e.g., wheelchair, or bedside chair)? [] 1 [] 2 [x] 3 [] 4   5. Walking in hospital room? [] 1 [] 2 [x] 3 [] 4   6. Climbing 3-5 steps with a railing?+   [] 1 [] 2 [] 3 [] 4   +If stair climbing cannot be assessed, skip item #6. Sum responses from items 1-5. Based on an AM-PAC score of /24 (or 15/20 if omitting stairs) and their current functional mobility deficits, it is recommended that the patient have 2-3 sessions per week of Physical Therapy at d/c to increase the patient's independence.

## 2022-12-01 NOTE — PROGRESS NOTES
Hospitalist Progress Note-critical care note     Patient: Alexi Ledezma MRN: [id number]  CSN: [id number]    YOB: 1945  Age: 68 y.o. Sex: female    DOA: 11/29/2022 LOS:  LOS: 2 days            Chief complaint: colitis , dehydration, elevated trop uti , dm     Assessment/Plan         Hospital Problems  Date Reviewed: 6/7/2022            Codes Class Noted POA    Colitis ICD-10-CM: K52.9  ICD-9-CM: 558.9  11/30/2022 Unknown        * (Principal) Severe dehydration ICD-10-CM: E86.0  ICD-9-CM: 276.51  11/29/2022 Unknown        Hypotension ICD-10-CM: I95.9  ICD-9-CM: 458.9  11/29/2022 Unknown        Tachycardia ICD-10-CM: R00.0  ICD-9-CM: 785.0  11/29/2022 Unknown        Hypomagnesemia ICD-10-CM: E83.42  ICD-9-CM: 275.2  11/29/2022 Unknown        Elevated troponin ICD-10-CM: R77.8  ICD-9-CM: 790.6  11/29/2022 Unknown        UTI (urinary tract infection) ICD-10-CM: N39.0  ICD-9-CM: 599.0  11/29/2022 Unknown        Flu ICD-10-CM: J11.1  ICD-9-CM: 487.1  11/29/2022 Unknown        Diarrhea ICD-10-CM: R19.7  ICD-9-CM: 787.91  11/29/2022 Unknown        Dehydration ICD-10-CM: E86.0  ICD-9-CM: 276.51  11/19/2021 Yes        Hyponatremia ICD-10-CM: E87.1  ICD-9-CM: 276.1  11/19/2021 Yes        JOHNATHAN (acute kidney injury) ([facility] [address] ICD-10-CM: N17.9  ICD-9-CM: 584.9  11/19/2021 Yes        [facility] ICD-10-CM: E11.9  ICD-9-CM: 250.00  Unknown Yes        Hypertension ICD-10-CM: I10  ICD-9-CM: 401.9  Unknown Yes          Hypotension due to severe dehydration-resolved        Flu A  Tamiflu renal dose, droplet isolation  Will complete 5 days treatment      JOHNATHAN-cr still elevated ,   Renal ultrasound right kidney medical disease, moderate post void bladder residual -need urologist as out-pt.  Urine cath as needed, today's renal function still pending   Like due to dehydration, continue normal saline infusion, renal dose medication, avoid NSAIDs and IV contrast  Nephrologist seeing pt     Nausea vomiting diarrhea  Still nausea,no appetite   Continue iv hydration   Change to clear diet to see if she can tolerates      UTI and colitiis   Continue zosyn      Hyponatremia, and hypomagnesemia-resolved   Continue monitoring     Severe dehydration  Due to GI loss, normal saline infusion     Tachycardia and elevated troponin   cardiologist on board   PVL no DVT, VQ scan negative for PE  Ef is good Grade I diastolic dysfunction present      Lactic acidosis \like due to dehydration -resolved         DM type II ,   -long term insulin at home  continue ssi       Subjective still nausea , no appetite       Disposition :tbd,   Review of systems:    General: No fevers or chills. No appetite    Cardiovascular: No chest pain or pressure. No palpitations. Pulmonary: No shortness of breath. Gastrointestinal: + nausea,  no vomiting. Vital signs/Intake and Output:  Visit Vitals  /71   Pulse 96   Temp 97.8 °F (36.6 °C)   Resp 17   Ht 5' 5\" (1.651 m)   Wt 83.9 kg (185 lb)   SpO2 97%   Breastfeeding No   BMI 30.79 kg/m²     Current Shift:  No intake/output data recorded. Last three shifts:  11/29 1901 - 12/01 0700  In: Anne Marie Spencer [P.O.:240; I.V.:1650]  Out: -     Physical Exam:  General: WD, WN. Alert, cooperative, no acute distress    HEENT: NC, Atraumatic. PERRLA, anicteric sclerae. Lungs: CTA Bilaterally. No Wheezing/Rhonchi/Rales. Heart:  Rrr ,   No murmur, No Rubs, No Gallops  Abdomen: Soft, Non distended, Non tender. +Bowel sounds,   Extremities: No c/c/e  Psych:   Not anxious or agitated. Neurologic:  No acute neurological deficit.              Labs: Results:       Chemistry Recent Labs     11/30/22 0437 11/29/22  1015   * 270*    131*   K 3.9 4.4    96*   CO2 19* 18*   BUN 56* 52*   CREA 3.02* 3.11*   CA 8.4* 9.0   AGAP 14 17   BUCR 19 17   AP  --  60   TP  --  7.0   ALB  --  3.2*   GLOB  --  3.8   AGRAT  --  0.8        CBC w/Diff Recent Labs     11/30/22  0437 11/29/22  1015   WBC 6.6 10.5   RBC 4.01* 5.04   HGB 11.6* 14.8   HCT 35.7 43.6    178   GRANS 59 69   LYMPH 20* 15*   EOS 0 0        Cardiac Enzymes No results for input(s): CPK, CKND1, KODAK in the last 72 hours. No lab exists for component: CKRMB, TROIP   Coagulation No results for input(s): PTP, INR, APTT, INREXT, INREXT in the last 72 hours. Lipid Panel Lab Results   Component Value Date/Time    Cholesterol, total 118 04/03/2010 06:10 AM    HDL Cholesterol 20 (L) 04/03/2010 06:10 AM    LDL, calculated 73 04/03/2010 06:10 AM    VLDL, calculated 25 04/03/2010 06:10 AM    Triglyceride 125 04/03/2010 06:10 AM    CHOL/HDL Ratio 5.9 (H) 04/03/2010 06:10 AM      BNP No results for input(s): BNPP in the last 72 hours. Liver Enzymes Recent Labs     11/29/22  1015   TP 7.0   ALB 3.2*   AP 60        Thyroid Studies Lab Results   Component Value Date/Time    TSH 0.83 04/17/2010 06:10 PM        Procedures/imaging: see electronic medical records for all procedures/Xrays and details which were not copied into this note but were reviewed prior to creation of Plan    NM LUNG SCAN PERF    Result Date: 11/29/2022  EXAM: NUCLEAR MEDICINE PULMONARY PERFUSION SCAN CLINICAL INDICATION/HISTORY: High clinical suspicion for pulmonary embolism. Decreased oxygen saturation and recent travel. COMPARISON: None Available TECHNIQUE: No aerosolized 99mTc-DTPA ventilatory images of the lungs were obtained. After intravenous administration of 7.0 mCi 99mTc-MAA, perfusion images of the lungs were obtained in multiple projections. Images are correlated with portable radiograph dated 11/29/2022.    > Injection site:  Left antecubital fossa ________________________________ FINDINGS: Perfusion images show symmetric radiotracer distribution to both lungs, with no segmental perfusion defects to suggest the presence of pulmonary embolism. ________________________________     No pulmonary embolism.  Perfusion only scintigraphy was performed and correlated with radiographic evaluation of the chest. This exam is reported using a perfusion only modified PIOPED II interpretive criteria. CT CHEST ABD PELV WO CONT    Result Date: 11/29/2022  EXAM: CT CHEST, ABDOMEN AND PELVIS CLINICAL INDICATION/HISTORY: Shortness of breath. Diarrhea. COMPARISON: None TECHNIQUE: Axial CT imaging of the chest, abdomen, and pelvis was performed without intravenous contrast. Multiplanar reformats were generated. One or more dose reduction techniques were used on this CT: automated exposure control, adjustment of the mAs and/or kVp according to patient size, and iterative reconstruction techniques. The specific techniques used on this CT exam have been documented in the patient's electronic medical record. Digital Imaging and Communications in Medicine (DICOM) format image data are available to nonaffiliated external healthcare facilities or entities on a secure, media free, reciprocally searchable basis with patient authorization for at least a 12-month period after this study. ________________ FINDINGS: CHEST: LUNGS: Lung architecture appears normal. Mild bibasilar subsegmental atelectasis. No nodule/mass. PLEURA: Normal. AIRWAY: Normal. MEDIASTINUM: Normal heart size. No pericardial effusion. Severe coronary and aortic atherosclerotic calcifications. LYMPH NODES: No enlarged lymph nodes. OTHER: None. _______________ ABDOMEN/PELVIS: LIVER, BILIARY: Liver is normal. No biliary dilation. Cholecystectomy. SPLEEN: Normal. PANCREAS: Normal. ADRENALS: Normal. KIDNEYS/URETERS/BLADDER: Kidneys appear normal. Mild fat stranding around the urinary bladder. LYMPH NODES: No enlarged lymph nodes. GASTROINTESTINAL TRACT: Edematous wall thickening of the colon extending from the distal transverse through sigmoid colon with mild surrounding fat stranding. No evidence of obstruction. VASCULATURE: Severe atherosclerotic calcifications. PELVIC ORGANS: Hysterectomy BONES: No acute or aggressive osseous abnormalities identified. OTHER: None. _______________     1. There is edematous wall thickening of the colon from the distal transverse through proximal sigmoid consistent with colitis. This may be infectious, inflammatory, or ischemic in etiology. 2. Mild fat stranding around urinary bladder, correlate for possible cystitis. XR CHEST PORT    Result Date: 11/29/2022  A portable AP radiograph of the chest was obtained at 1334 hours: INDICATION:  Nausea, vomiting, sepsis. COMPARISON:  Portable chest 4/21/2010. FINDINGS:  Heart and mediastinum: Unremarkable. Lungs and pleura: Previously noted infiltrates have resolved with no new infiltrates seen. Lungs are hypoinflated. Aorta: Aortic arch partially calcified. Bones: Within normal limits for age. Other: None. No significant abnormality. No new abnormality is seen developing since last study. DUPLEX LOWER EXT VENOUS BILAT    Result Date: 11/29/2022  · No evidence of deep vein thrombosis in the right lower extremity. · No evidence of deep vein thrombosis in the left lower extremity.         Kay Recio MD

## 2022-12-01 NOTE — PROGRESS NOTES
Problem: Mobility Impaired (Adult and Pediatric)  Goal: *Acute Goals and Plan of Care (Insert Text)  Description: Physical Therapy Goals   Initiated 11/30/2022 and to be accomplished within 7 day(s)  1. Patient will move from supine <> sit with mod I in prep for out of bed activity and change of position. 2.  Patient will perform sit<> stand with mod I with LRAD in prep for transfers/ambulation. 3.  Patient will transfer from bed <> chair with mod I with LRAD for time up in chair for completion of ADL activity. 4.  Patient will ambulate 100 feet with mod I/LRAD for improved functional mobility at discharge. 5.  Patient will ascend/descend 2 stairs with CGA for home re-entry as needed. Outcome: Progressing Towards Goal      PHYSICAL THERAPY TREATMENT    Patient: Alonzo Morin (20 y.o. female)  Date: 12/1/2022  Diagnosis: Severe dehydration [E86.0] Severe dehydration  Precautions: Fall, Other (comment) (Droplet -Influenza A)  PLOF: amb with AD PTA    ASSESSMENT:  Pt seated EOB on arrival and reporting R thigh pain 8/10 (chronic). Completes transfers with CGA. GT distance increased to 67ft total with 1 seated rest break. Margie slow, antalgic with incr'd stance time R LE, though still more fluid than when seen yesterday. O2 sat 98% t/o session. Pt initially left seated EOB, then requested assist to bathroom. Pt assisted to bathroom, voided, changed under garment and shorts, then returned to bedside, new gown donned and pt returned to supine. Extended time required to perform self care. Rec HHPT at discharge. Will continue per POC. Progression toward goals:   []      Improving appropriately and progressing toward goals  [x]      Improving slowly and progressing toward goals  []      Not making progress toward goals and plan of care will be adjusted     PLAN:  Patient continues to benefit from skilled intervention to address the above impairments.   Continue treatment per established plan of care.  Discharge Recommendations: Home Physical Therapy  Further Equipment Recommendations for Discharge:  rolling walker    AMPAC: 17/20    This AMPAC score should be considered in conjunction with interdisciplinary team recommendations to determine the most appropriate discharge setting. Patient's social support, diagnosis, medical stability, and prior level of function should also be taken into consideration. SUBJECTIVE:   Patient stated I feel a tiny bit better.     OBJECTIVE DATA SUMMARY:   Critical Behavior:  Neurologic State: Alert  Orientation Level: Oriented X4  Cognition: Follows commands, Appropriate for age attention/concentration  Safety/Judgement: Awareness of environment  Functional Mobility Training:  Bed Mobility:  Rolling: Stand-by assistance  Sit to Supine: Stand-by assistance;Supervision  Transfers:  Sit to Stand: Contact guard assistance  Stand to Sit: Contact guard assistance  Balance:  Sitting: Intact  Standing: Intact; With support  Standing - Static: Good  Standing - Dynamic : Fair (fair+)    Ambulation/Gait Training:  Distance (ft): 67 Feet (ft)  Assistive Device: Gait belt;Walker, rolling  Ambulation - Level of Assistance: Contact guard assistance;Stand-by assistance  Gait Description (WDL): Exceptions to WDL  Gait Abnormalities: Antalgic;Decreased step clearance; Step to gait  Stance: Right increased  Speed/Margie: Slow  Step Length: Left shortened;Right shortened  Interventions: Verbal cues  Pain:  Pain level pre-treatment: 8/10  Pain level post-treatment: 7/10   Pain Intervention(s): Medication (see MAR); Rest, Ice, Repositioning   Response to intervention: Nurse notified, See doc flow  Activity Tolerance:   Fair   Please refer to the flowsheet for vital signs taken during this treatment.   After treatment:   [] Patient left in no apparent distress sitting up in chair  [x] Patient left in no apparent distress in bed  [x] Call bell left within reach  [x] Nursing notified  [] Caregiver present  [] Bed alarm activated  [] SCDs applied      COMMUNICATION/EDUCATION:   [x]         Role of Physical Therapy in the acute care setting. [x]         Fall prevention education was provided and the patient/caregiver indicated understanding. [x]         Patient/family have participated as able in working toward goals and plan of care. [x]         Patient/family agree to work toward stated goals and plan of care. []         Patient understands intent and goals of therapy, but is neutral about his/her participation. []         Patient is unable to participate in stated goals/plan of care: ongoing with therapy staff.  []         Other:        Lucas Guerrero, PT   Time Calculation: 61 mins    [address] [facility] [address] [address] AM-PAC® Basic Mobility Inpatient Short Form (6-Clicks) Version 2    How much HELP from another person does the patient currently need    (If the patient hasn't done an activity recently, how much help from another person do you think he/she would need if he/she tried?)   Total (Total A or Dep)   A Lot  (Mod to Max A)   A Little (Sup or Min A)   None (Mod I to I)   Turning from your back to your side while in a flat bed without using bedrails? [] 1 [] 2 [] 3 [x] 4   2. Moving from lying on your back to sitting on the side of a flat bed without using bedrails? [] 1 [] 2 [] 3 [x] 4   3. Moving to and from a bed to a chair (including a wheelchair)? [] 1 [] 2 [x] 3 [] 4   4. Standing up from a chair using your arms (e.g., wheelchair, or bedside chair)? [] 1 [] 2 [x] 3 [] 4   5. Walking in hospital room? [] 1 [] 2 [x] 3 [] 4   6. Climbing 3-5 steps with a railing?+   [] 1 [] 2 [] 3 [] 4   +If stair climbing cannot be assessed, skip item #6. Sum responses from items 1-5.        Based on an AM-PAC score of **/24 (or 17/20 if omitting stairs) and their current functional mobility deficits, it is recommended that the patient have 2-3 sessions per week of Physical Therapy at d/c to increase the patient's independence.

## 2022-12-01 NOTE — PROGRESS NOTES
Problem: Dysphagia (Adult)  Description: Patient will:  1. Tolerate PO trials with 0 s/s overt distress in 4/5 trials. 2. Utilize compensatory swallow strategies/maneuvers (decrease bite/sip, size/rate, alt. liq/sol) with min cues in 4/5 trials. Rec:     Clear liquids (per MD); when medically appropriate, safe for regular, thin liquid diet from swallowing/SLP perspective  Aspiration precautions  HOB >45 during po intake, remain >30 for 30-45 minutes after po   Small bites/sips; alternate liquid/solid with slow feeding rate   Oral care TID  Meds per pt preference   Outcome: Progressing Towards Goal      SPEECH LANGUAGE PATHOLOGY BEDSIDE SWALLOW   EVALUATION & TREATMENT     Patient: Ce Vang (1 y.o. female)  Date: 12/1/2022  Primary Diagnosis: Severe dehydration [E86.0]       Precautions: Aspiration, Fall, Other (comment) (Droplet -Influenza A)  PLOF: Regular, thin    ASSESSMENT :  Based on the objective data described below, the patient presents with no s/sx of oral or pharyngeal dysphagia at bedside. Patient reporting sensation of food getting stuck in mid-chest region with episodes of belching throughout meals. Patient denies prior difficulties with swallowing. Currently on clear liquid diet. RN cleared SLP to trial all textures (thin, puree, solid) for this evaluation. Patient is A/Ox4. Trials of ice chips, thin, puree, and solid textures given. Patient demonstrating +rotary chew (slow but thorough), appearance of WFL A-P transit, and oral clearance with timely swallow initiation. Laryngeal elevation noted to palpation and appears decreased. No overt s/sx of aspiration visualized across consistencies. Patient noted to belch between each bite/sip and reported sensation of food being \"stuck\" in mid-chest region after several bites of puree. From SLP/swallowing perspective patient appears safe for regular, thin liquid diet. Currently on clear liquids per MD orders.  May benefit from consult with GI. D/w JAVIER Link. ST to follow x 1-2 to ensure diet tolerance/ safety. TREATMENT :  Skilled therapy initiated; Educated pt on aspiration precautions and importance of compensatory swallow techniques to decrease aspiration risk (decrease rate of intake & sip/bite size, upright @HOB for all po intake and ~30 minutes after po); verbalized comprehension. SLP to follow as indicated. Patient will benefit from skilled intervention to address the above impairments. Patient's rehabilitation potential is considered to be Good  Factors which may influence rehabilitation potential include:   [x]            None noted  []            Mental ability/status  []            Medical condition  []            Home/family situation and support systems  []            Safety awareness  []            Pain tolerance/management  []            Other:      PLAN :  Recommendations and Planned Interventions:  See above  Frequency/Duration: Patient will be followed by speech-language pathology 3 times a week to address goals. Discharge Recommendations: To Be Determined     SUBJECTIVE:   Patient stated It feels like it's stuck right here.     OBJECTIVE:     Past Medical History:   Diagnosis Date    Arthritis     \"all over\"    Diabetes ([facility] [address]     Type II 20 yrs ago    Hypertension     Ill-defined condition     Retinitis pigmentosa     Past Surgical History:   Procedure Laterality Date    HX COLONOSCOPY      HX HEENT      cataracts 8-9 yrs ago    [address]    HX ORTHOPAEDIC  2010    right shoulder    HX ORTHOPAEDIC  2009    foot surgery d/t arthritis    HX WISDOM TEETH EXTRACTION      many yrs ago    [address] [place] [address]    calcium deposit taken out right breast     Home Situation:   Home Situation  Home Environment: Private residence  # Steps to Enter: 2  Rails to Enter: No  One/Two Story Residence: One story  Living Alone: No  Support Systems: Spouse/Significant Other  Patient Expects to be Discharged to[de-identified] Home  Current DME Used/Available at Home: Cane, straight, Walker, rolling, Raised toilet seat  Tub or Shower Type: Shower    Diet prior to admission: Regular, thin  Current Diet:  Clear liquids     Cognitive and Communication Status:  Neurologic State: Alert  Orientation Level: Oriented X4  Cognition: Follows commands, Appropriate for age attention/concentration  Perception: Appears intact  Perseveration: No perseveration noted  Safety/Judgement: Awareness of environment  Oral Assessment:  Oral Assessment  Labial: No impairment  Dentition: Natural;Intact  Oral Hygiene:  (adequate)  Lingual: No impairment  Velum: No impairment  Mandible: No impairment  Gag Reflex:  (did not test)  P.O. Trials:  Patient Position:  (upright EOB)  Vocal quality prior to P.O.: Low volume  Consistency Presented: Thin liquid; Solid;Puree  How Presented: Self-fed/presented;SLP-fed/presented;Cup/sip;Spoon;Straw;Successive swallows     Bolus Acceptance: No impairment  Bolus Formation/Control: No impairment     Propulsion: No impairment  Oral Residue: None  Initiation of Swallow: No impairment  Laryngeal Elevation: Decreased  Aspiration Signs/Symptoms: None  Pharyngeal Phase Characteristics: No impairment, issues, or problems   Effective Modifications: Alternate liquids/solids;Small sips and bites  Cues for Modifications: Minimal       Oral Phase Severity: No impairment  Pharyngeal Phase Severity :  (no suspected impairment)    PAIN:  Pain level pre-treatment: 0/10   Pain level post-treatment: 0/10     After treatment:   []            Patient left in no apparent distress sitting up in chair  [x]            Patient left in no apparent distress in bed  [x]            Call bell left within reach  [x]            Nursing notified  []            Family present  []            Caregiver present  []            Bed alarm activated    COMMUNICATION/EDUCATION:   [x]            Aspiration precautions; swallow safety; compensatory techniques.   [] Patient/family have participated as able in goal setting and plan of care. []            Patient/family agree to work toward stated goals and plan of care. []            Patient understands intent and goals of therapy; neutral about participation. []            Patient unable to participate in goal setting/plan of care; educ ongoing with interdisciplinary staff  []         Posted safety precautions in patient's room.     Thank you for this referral.  Kyle Bazan SLP  Time Calculation: 27 mins  Evaluation Time: 15 minutes   Treatment Time: 12 minutes

## 2022-12-01 NOTE — ROUTINE PROCESS
Bedside and Verbal shift change report given to Melly Pabon RN by Monica Staples RN. Report included the following information SBAR and Kardex.

## 2022-12-01 NOTE — PROGRESS NOTES
RENAL CONSULT  2022    Patient:  Ce Vang  :  1945  Gender:  female  MRN #:  [id number]    Consulting Physician:  Michael Martínez MD,    Assessment:   she is 68 yrs lady with hypertension, diabetes, presented to ER due to nausea , vomiting and diarrhea with decreased appetite for 2 or 3 days. She was found to have hypotension to 85/6-0 mmHg on arrival which improved after 2 liter fluid bolus . she has acute renal failure most likely ATN due to hypotension vs volume depletion      Acute renal failure  Hyponatremia  Influenza infection   UTI  Acidosis   Hypermagnesemia     Subjective:   She says she is urinating fine   But has suprapubic discomfort and full ness  No fever and difficulty breathing   No other complaints    PLAN:   - continue volume expansion , normal saline 75 cc/hour   She has high post void residue in USG   Need bladder scan BID post void and straight cath for > 300 cc urinary retention   Please record urine output   Intake and output   Avoid nsaids , contrast and nephrotoxin  Dose all meds and antibiotics for current eGFR  No indication of dialysis   Other management per primary team            Objective:  Visit Vitals  BP (!) 151/68 (BP 1 Location: Right upper arm, BP Patient Position: At rest)   Pulse (!) 105   Temp 97.5 °F (36.4 °C)   Resp 16   Ht 5' 5\" (1.651 m)   Wt 83.9 kg (185 lb)   SpO2 100%   Breastfeeding No   BMI 30.79 kg/m²         Well developed and groomed  Eyes: anicteric  No edema  Chest clear           Laboratory Data:  Lab Results   Component Value Date    BUN 56 (H) 2022    BUN 52 (H) 2022    BUN 13 2022     2022     (L) 2022     2022    CO2 19 (L) 2022    CO2 18 (L) 2022    CO2 30 2022     Lab Results   Component Value Date    WBC 6.6 2022    HGB 11.6 (L) 2022    HCT 35.7 2022     Imaging have been reviewed:    USG retroperitoneum- Mildly increased right renal cortical echogenicity which can reflect medical  renal disease. 2. Moderate post void bladder residual      I have reviewed patient's record for pertinent labs, radiology and other test . I have reviewed old records. I have ordered labs, medications and radiology as necessary and indicated per patient's condition . Total time spent is approximately 31 minutes. Greater than 50 % of that time was spent in counseling and or care coordination.          Omayra Hardy MD,

## 2022-12-01 NOTE — CONSULTS
[facility] Consult Note      Patient: Yaneth Marley MRN: [id number]  SSN: [id number]    YOB: 1945  Age: 68 y.o. Sex: female    Date of Consultation: 11/30/2022  Referring Physician: Nithya Avitia MD  Reason for Consultation: Abnormal troponin    Chief complain:  nausea, vomiting and diarrhea    HPI:  75-year-old female came to emergency room with complaining of nausea, vomiting and diarrhea for 2-3 days. Denies any chest pain. Denies any unusual shortness of breath on exertion. Denies any dizziness, palpitation, presyncope or syncope. She is complaining of feeling fatigue and tired. On ER arrival she was hypertensive and tachycardic and in acute renal failure. She is influenza A positive. Cardiology consult called for evaluation of abnormal troponin. Denies any current smoking or alcohol abuse.     Past Medical History:   Diagnosis Date    Arthritis     \"all over\"    Diabetes ([address]     Type II 20 yrs ago    Hypertension     Ill-defined condition     Retinitis pigmentosa     Past Surgical History:   Procedure Laterality Date    HX COLONOSCOPY      HX HEENT      cataracts 8-9 yrs ago    [address]    HX ORTHOPAEDIC  2010    right shoulder    HX ORTHOPAEDIC  2009    foot surgery d/t arthritis    HX WISDOM TEETH EXTRACTION      many yrs ago    [address] [place] [address]    calcium deposit taken out right breast     Current Facility-Administered Medications   Medication Dose Route Frequency    rOPINIRole (REQUIP) tablet 0.5 mg  0.5 mg Oral DAILY    piperacillin-tazobactam (ZOSYN) 2.25 g in 0.9% sodium chloride (MBP/ADV) 50 mL MBP  2.25 g IntraVENous Q8H    oseltamivir (TAMIFLU) capsule 30 mg  30 mg Oral DAILY    aspirin chewable tablet 81 mg  81 mg Oral DAILY    montelukast (SINGULAIR) tablet 10 mg  10 mg Oral DAILY    rosuvastatin (CRESTOR) tablet 10 mg  10 mg Oral QHS    sodium chloride (NS) flush 5-40 mL  5-40 mL IntraVENous Q8H    sodium chloride (NS) flush 5-40 mL  5-40 mL IntraVENous PRN    acetaminophen (TYLENOL) tablet 650 mg  650 mg Oral Q6H PRN    Or    acetaminophen (TYLENOL) suppository 650 mg  650 mg Rectal Q6H PRN    bisacodyL (DULCOLAX) suppository 10 mg  10 mg Rectal DAILY PRN    promethazine (PHENERGAN) tablet 12.5 mg  12.5 mg Oral Q6H PRN    Or    ondansetron (ZOFRAN) injection 4 mg  4 mg IntraVENous Q6H PRN    heparin (porcine) injection 5,000 Units  5,000 Units SubCUTAneous Q8H    pantoprazole (PROTONIX) tablet 40 mg  40 mg Oral ACB    0.9% sodium chloride infusion  75 mL/hr IntraVENous CONTINUOUS    [Held by provider] insulin glargine (LANTUS) injection 10 Units  10 Units SubCUTAneous QHS    insulin lispro (HUMALOG) injection   SubCUTAneous AC&HS    glucose chewable tablet 16 g  4 Tablet Oral PRN    glucagon (GLUCAGEN) injection 1 mg  1 mg IntraMUSCular PRN    dextrose 10% infusion 0-250 mL  0-250 mL IntraVENous PRN    hydrALAZINE (APRESOLINE) 20 mg/mL injection 10 mg  10 mg IntraVENous Q6H PRN    amLODIPine (NORVASC) tablet 5 mg  5 mg Oral DAILY       Allergies and Intolerances: Allergies   Allergen Reactions    Bacitracin Rash    Jardiance [Empagliflozin] Other (comments)     Uti symptoms       Family History:   No family history on file. Social History:   She  reports that she quit smoking about 16 years ago. She has never used smokeless tobacco.  She  reports that she does not currently use alcohol. Review of Systems:     Gen: No fever, chills, malaise, weight loss/gain. Heent: No headache, rhinorrhea, epistaxis, ear pain, hearing loss, sinus pain, neck pain/stiffness, sore throat. Heart: No chest pain, palpitations,  shortness of breath, pnd, or orthopnea. Resp: No cough, hemoptysis, wheezing and  dyspnea  GI:  positive nausea, vomiting, diarrhea,  no constipation, melena or hematochezia. : No urinary obstruction, dysuria or hematuria. Derm: No rash, new skin lesion or pruritis. Musc/skeletal: no bone or joint complains.   Vasc: No edema, cyanosis or claudication. Endo: No heat/cold intolerance, no polyuria,polydipsia or polyphagia. Neuro: No unilateral weakness, numbness, tingling. No seizures. Heme: No easy bruising or bleeding. Physical:   Patient Vitals for the past 6 hrs:   Temp Pulse Resp BP SpO2   11/30/22 1600 98.3 °F (36.8 °C) (!) 106 19 105/62 95 %         Exam:   General Appearance: Comfortable, not using accessory muscles of respiration. HEENT: SHEELA. HEAD: Atraumatic  NECK: No JVD, no thyroidomeglay. CAROTIDS: no bruit  LUNGS: Clear bilaterally. HEART: S1+S2 audible, no murmur, no pericardial rub. ABD: Non-tender, BS Audible    EXT: No edema, and no cyanosis. VASCULAR EXAM: Pulses are intact. PSYCHIATRIC EXAM: Mood is appropriate. MUSCULOSKELETAL: Grossly no joint deformity.   NEUROLOGICAL: AAO times 3, Motor and sensory sytem intact       Review of Data:   LABS:   Lab Results   Component Value Date/Time    WBC 6.6 11/30/2022 04:37 AM    HGB 11.6 (L) 11/30/2022 04:37 AM    HCT 35.7 11/30/2022 04:37 AM    PLATELET 730 05/69/3787 04:37 AM     Lab Results   Component Value Date/Time    Sodium 136 11/30/2022 04:37 AM    Potassium 3.9 11/30/2022 04:37 AM    Chloride 103 11/30/2022 04:37 AM    CO2 19 (L) 11/30/2022 04:37 AM    Glucose 141 (H) 11/30/2022 04:37 AM    BUN 56 (H) 11/30/2022 04:37 AM    Creatinine 3.02 (H) 11/30/2022 04:37 AM     Lab Results   Component Value Date/Time    Cholesterol, total 118 04/03/2010 06:10 AM    HDL Cholesterol 20 (L) 04/03/2010 06:10 AM    LDL, calculated 73 04/03/2010 06:10 AM    Triglyceride 125 04/03/2010 06:10 AM     No components found for: GPT  Lab Results   Component Value Date/Time    Hemoglobin A1c 7.0 (H) 11/29/2022 10:15 AM         Cardiology Procedures:   Results for orders placed or performed during the hospital encounter of 11/29/22   EKG, 12 LEAD, INITIAL   Result Value Ref Range    Ventricular Rate 123 BPM    Atrial Rate 123 BPM    P-R Interval 164 ms    QRS Duration 66 ms    Q-T Interval 388 ms    QTC Calculation (Bezet) 555 ms    Calculated P Axis 31 degrees    Calculated R Axis 1 degrees    Calculated T Axis 56 degrees    Diagnosis       Sinus tachycardia  Low voltage QRS  Nonspecific ST abnormality  Abnormal ECG  Confirmed by Meghana Pandey MD, [facility] ([id number]) on 11/29/2022 8:54:00 PM             Impression / Plan:    Patient Active Problem List   Diagnosis Code    Dehydration E86.0    Hyponatremia E87.1    Acute UTI N39.0    JOHNATHAN (acute kidney injury) ([address] N17.9    Diabetes ([address] E11.9    Hypertension I10    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 Plan:    Continue aspirin, amlodipine and rosuvastatin.     Echocardiogram.    Further cardiac workup as clinically indicated        Signed By: Shiela Pham MD     November 30, 2022

## 2022-12-01 NOTE — PROGRESS NOTES
Problem: Self Care Deficits Care Plan (Adult)  Goal: *Acute Goals and Plan of Care (Insert Text)  Description: Occupational Therapy Goals  Initiated 11/30/2022 within 7 day(s). 1.  Patient will perform grooming with supervision/set-up standing at sink for 5 minutes or more. 2.  Patient will perform lower body dressing with supervision/set-up and use of AEs as needed. 3.  Patient will perform toilet transfers with supervision/set-up. 4.  Patient will perform all aspects of toileting with supervision/set-up. 5.  Patient will participate in upper extremity therapeutic exercise/activities with supervision/set-up for 10 minutes. 6.  Patient will utilize energy conservation techniques during functional activities with verbal, visual, and tactile cues. OCCUPATIONAL THERAPY EVALUATION    Patient: Virgia Ahumada (31 y.o. female)  Date: 11/30/2022  Primary Diagnosis: Severe dehydration [E86.0]       Precautions:   Fall, Other (comment) (Droplet -Influenza A)  PLOF: mod I for transfers and ADLs     ASSESSMENT :  Based on the objective data described below, the patient presents with decreased strength, endurance and balance for carryover of ADLs and transfers following above mentioned medical diagnosis. Pt seen in full PPE and co-treat with PT for the need of another set of skilled hands and safety with transfers/ADLs. Pt performed stand to sit transfers, LB dressing and returned to bed in supine at the end of session in NAD. Pt reports weakness and pain at R hip and quads following a bladder procedure about an year ago. Patient will benefit from skilled intervention to address the above impairments.   Patient's rehabilitation potential is considered to be Good  Factors which may influence rehabilitation potential include:   [x]             None noted  []             Mental ability/status  []             Medical condition  []             Home/family situation and support systems  []             Safety awareness  []             Pain tolerance/management  []             Other:      PLAN :  Recommendations and Planned Interventions:   [x]               Self Care Training                  [x]      Therapeutic Activities  [x]               Functional Mobility Training   []      Cognitive Retraining  [x]               Therapeutic Exercises           [x]      Endurance Activities  [x]               Balance Training                    []      Neuromuscular Re-Education  []               Visual/Perceptual Training     [x]      Home Safety Training  [x]               Patient Education                   [x]      Family Training/Education  []               Other (comment):    Frequency/Duration: Patient will be followed by occupational therapy 3 times a week to address goals. Discharge Recommendations: Home Health  Further Equipment Recommendations for Discharge: N/A    AMPAC: 19/24    This AMPAC score should be considered in conjunction with interdisciplinary team recommendations to determine the most appropriate discharge setting. Patient's social support, diagnosis, medical stability, and prior level of function should also be taken into consideration. SUBJECTIVE:   Patient stated  I am doing alright.     OBJECTIVE DATA SUMMARY:     Past Medical History:   Diagnosis Date    Arthritis     \"all over\"    Diabetes ([facility] [address]     Type II 20 yrs ago    Hypertension     Ill-defined condition     Retinitis pigmentosa     Past Surgical History:   Procedure Laterality Date    HX COLONOSCOPY      HX HEENT      cataracts 8-9 yrs ago    [address] [facility]    HX ORTHOPAEDIC  2010    right shoulder    HX ORTHOPAEDIC  2009    foot surgery d/t arthritis    HX WISDOM TEETH EXTRACTION      many yrs ago    [address] [place] [address]    calcium deposit taken out right breast     Barriers to Learning/Limitations: None  Compensate with: visual, verbal, tactile, kinesthetic cues/model    Home Situation:   [address] [facility] Environment: Private residence  # Steps to Enter: 2  Rails to [facility]: No  One/Two Story Residence: One story  Living Alone: No  Support Systems: Spouse/Significant Other  Patient Expects to be Discharged to[de-identified] Home  Current DME Used/Available at Home: Cane, straight, Walker, rolling, Raised toilet seat  Tub or Shower Type: Shower  []  Right hand dominant   []  Left hand dominant    Cognitive/Behavioral Status:  Neurologic State: Alert; Appropriate for age  Orientation Level: Oriented X4  Cognition: Follows commands  Safety/Judgement: Awareness of environment; Fall prevention    Skin: intact  Edema: none    Vision/Perceptual:    Tracking: Able to track stimulus in all quadrants w/o difficulty     Corrective Lenses: Glasses  Coordination: BUE  Coordination: Within functional limits  Fine Motor Skills-Upper: Left Intact; Right Intact    Gross Motor Skills-Upper: Left Intact; Right Intact  Balance:  Sitting: Intact  Standing: With support; Impaired  Standing - Static: Good  Standing - Dynamic : Fair  Strength: BUE  Strength: Generally decreased, functional  Tone & Sensation: BUE  Sensation: Intact  Range of Motion: BUE  AROM: Generally decreased, functional  Functional Mobility and Transfers for ADLs:  Bed Mobility:  Rolling: Stand-by assistance  Supine to Sit: Stand-by assistance;Supervision; Additional time  Sit to Supine: Supervision  Transfers:  Sit to Stand: Contact guard assistance  Stand to Sit: Contact guard assistance  ADL Assessment:   Feeding: Independent    Oral Facial Hygiene/Grooming: Contact guard assistance    Upper Body Dressing: Contact guard assistance    Lower Body Dressing: Minimum assistance    Toileting: Contact guard assistance  ADL Intervention:  Cognitive Retraining  Safety/Judgement: Awareness of environment; Fall prevention  Pain:  Pain level pre-treatment: 0/10   Pain level post-treatment: 0/10   Pain Intervention(s): Medication (see MAR);  Rest, Ice, Repositioning   Response to intervention: Nurse notified, See doc flow    Activity Tolerance:   Fair+   Please refer to the flowsheet for vital signs taken during this treatment. After treatment:   [] Patient left in no apparent distress sitting up in chair  [x] Patient left in no apparent distress in bed  [x] Call bell left within reach  [x] Nursing notified  [] Caregiver present  [x] Bed alarm activated    COMMUNICATION/EDUCATION:   [x] Role of Occupational Therapy in the acute care setting  [x] Home safety education was provided and the patient/caregiver indicated understanding. [x] Patient/family have participated as able in goal setting and plan of care. [x] Patient/family agree to work toward stated goals and plan of care. [] Patient understands intent and goals of therapy, but is neutral about his/her participation. [] Patient is unable to participate in goal setting and plan of care. Thank you for this referral.  Vicente Aiken OTR/L  Time Calculation: 12 mins    Eval Complexity: History: LOW Complexity : Brief history review ; Examination: LOW Complexity : 1-3 performance deficits relating to physical, cognitive , or psychosocial skils that result in activity limitations and / or participation restrictions ; Decision Making:LOW Complexity : No comorbidities that affect functional and no verbal or physical assistance needed to complete eval tasks     [facility] AM-PAC® Daily Activity Inpatient Short Form (6-Clicks)*    How much HELP from another person does the patient currently need    (If the patient hasn't done an activity recently, how much help from another person do you think he/she would need if he/she tried?)   Total (Total A or Dep)   A Lot  (Mod to Max A)   A Little (Sup or Min A)   None (Mod I to I)   Putting on and taking off regular lower body clothing? [] 1 [] 2 [x] 3 [] 4   2. Bathing (including washing, rinsing,      drying)? [] 1 [] 2 [x] 3 [] 4   3.  Toileting, which includes using toilet, bedpan or urinal?   [] 1 [] 2 [x] 3 [] 4   4. Putting on and taking off regular upper body clothing? [] 1 [] 2 [x] 3 [] 4   5. Taking care of personal grooming such as brushing teeth? [] 1 [] 2 [x] 3 [] 4   6. Eating meals? [] 1 [] 2 [] 3 [x] 4     Based on an AM-PAC score of **/24 and their current ADL deficits; it is recommended that the patient have 5-7 sessions per week of Occupational Therapy at d/c to increase the patient's independence. Currently, this patient demonstrates the potential endurance, and/or tolerance for 3 hours of therapy each day at d/c. Based on an AM-PAC score of **/24 and their current ADL deficits; it is recommended that the patient have 3-5 sessions per week of Occupational Therapy at d/c to increase the patient's independence. Based on an AM-PAC score of **/24 and their current ADL deficits; it is recommended that the patient have 2-3 sessions per week of Occupational Therapy at d/c to increase the patient's independence. At this time and based on an AM-PAC score of **/24, no further OT is recommended upon discharge due to (i.e. patient at baseline functional statusetc). Recommend patient returns to prior setting with prior services.

## 2022-12-02 LAB
ANION GAP SERPL CALC-SCNC: 12 MMOL/L (ref 3–18)
BACTERIA SPEC CULT: ABNORMAL
BACTERIA SPEC CULT: ABNORMAL
BASOPHILS # BLD: 0 K/UL (ref 0–0.1)
BASOPHILS NFR BLD: 0 % (ref 0–2)
BUN SERPL-MCNC: 53 MG/DL (ref 7–18)
BUN/CREAT SERPL: 16 (ref 12–20)
CALCIUM SERPL-MCNC: 9.5 MG/DL (ref 8.5–10.1)
CC UR VC: ABNORMAL
CHLORIDE SERPL-SCNC: 107 MMOL/L (ref 100–111)
CO2 SERPL-SCNC: 21 MMOL/L (ref 21–32)
CREAT SERPL-MCNC: 3.4 MG/DL (ref 0.6–1.3)
DIFFERENTIAL METHOD BLD: ABNORMAL
EOSINOPHIL # BLD: 0 K/UL (ref 0–0.4)
EOSINOPHIL NFR BLD: 0 % (ref 0–5)
ERYTHROCYTE [DISTWIDTH] IN BLOOD BY AUTOMATED COUNT: 14.7 % (ref 11.6–14.5)
GLUCOSE BLD STRIP.AUTO-MCNC: 138 MG/DL (ref 70–110)
GLUCOSE BLD STRIP.AUTO-MCNC: 169 MG/DL (ref 70–110)
GLUCOSE BLD STRIP.AUTO-MCNC: 176 MG/DL (ref 70–110)
GLUCOSE BLD STRIP.AUTO-MCNC: 208 MG/DL (ref 70–110)
GLUCOSE SERPL-MCNC: 152 MG/DL (ref 74–99)
HCT VFR BLD AUTO: 34.8 % (ref 35–45)
HGB BLD-MCNC: 11.2 G/DL (ref 12–16)
IMM GRANULOCYTES # BLD AUTO: 0 K/UL
IMM GRANULOCYTES NFR BLD AUTO: 0 %
LYMPHOCYTES # BLD: 1.1 K/UL (ref 0.9–3.6)
LYMPHOCYTES NFR BLD: 20 % (ref 21–52)
MAGNESIUM SERPL-MCNC: 2.6 MG/DL (ref 1.6–2.6)
MCH RBC QN AUTO: 28.9 PG (ref 24–34)
MCHC RBC AUTO-ENTMCNC: 32.2 G/DL (ref 31–37)
MCV RBC AUTO: 89.7 FL (ref 78–100)
MONOCYTES # BLD: 1.1 K/UL (ref 0.05–1.2)
MONOCYTES NFR BLD: 19 % (ref 3–10)
NEUTS SEG # BLD: 3.4 K/UL (ref 1.8–8)
NEUTS SEG NFR BLD: 61 % (ref 40–73)
NRBC # BLD: 0 K/UL (ref 0–0.01)
NRBC BLD-RTO: 0 PER 100 WBC
PERIPHERAL SMEAR,PSM: NORMAL
PLATELET # BLD AUTO: 179 K/UL (ref 135–420)
PLATELET COMMENTS,PCOM: ABNORMAL
PMV BLD AUTO: 9.9 FL (ref 9.2–11.8)
POTASSIUM SERPL-SCNC: 3.4 MMOL/L (ref 3.5–5.5)
RBC # BLD AUTO: 3.88 M/UL (ref 4.2–5.3)
RBC MORPH BLD: ABNORMAL
SERVICE CMNT-IMP: ABNORMAL
SODIUM SERPL-SCNC: 140 MMOL/L (ref 136–145)
WBC # BLD AUTO: 5.6 K/UL (ref 4.6–13.2)

## 2022-12-02 PROCEDURE — 82962 GLUCOSE BLOOD TEST: CPT

## 2022-12-02 PROCEDURE — 74011250637 HC RX REV CODE- 250/637: Performed by: INTERNAL MEDICINE

## 2022-12-02 PROCEDURE — 74011000250 HC RX REV CODE- 250: Performed by: HOSPITALIST

## 2022-12-02 PROCEDURE — 74011000258 HC RX REV CODE- 258: Performed by: HOSPITALIST

## 2022-12-02 PROCEDURE — 36415 COLL VENOUS BLD VENIPUNCTURE: CPT

## 2022-12-02 PROCEDURE — 85025 COMPLETE CBC W/AUTO DIFF WBC: CPT

## 2022-12-02 PROCEDURE — 74011636637 HC RX REV CODE- 636/637: Performed by: HOSPITALIST

## 2022-12-02 PROCEDURE — 74011250637 HC RX REV CODE- 250/637: Performed by: EMERGENCY MEDICINE

## 2022-12-02 PROCEDURE — 65270000046 HC RM TELEMETRY

## 2022-12-02 PROCEDURE — 74011250636 HC RX REV CODE- 250/636: Performed by: HOSPITALIST

## 2022-12-02 PROCEDURE — 74011250637 HC RX REV CODE- 250/637: Performed by: HOSPITALIST

## 2022-12-02 PROCEDURE — 74011250636 HC RX REV CODE- 250/636: Performed by: STUDENT IN AN ORGANIZED HEALTH CARE EDUCATION/TRAINING PROGRAM

## 2022-12-02 PROCEDURE — 80048 BASIC METABOLIC PNL TOTAL CA: CPT

## 2022-12-02 PROCEDURE — 83735 ASSAY OF MAGNESIUM: CPT

## 2022-12-02 RX ORDER — POTASSIUM CHLORIDE 20 MEQ/1
20 TABLET, EXTENDED RELEASE ORAL
Status: DISCONTINUED | OUTPATIENT
Start: 2022-12-02 | End: 2022-12-02

## 2022-12-02 RX ORDER — POTASSIUM CHLORIDE 20 MEQ/1
20 TABLET, EXTENDED RELEASE ORAL
Status: COMPLETED | OUTPATIENT
Start: 2022-12-02 | End: 2022-12-02

## 2022-12-02 RX ORDER — SODIUM CHLORIDE 9 MG/ML
75 INJECTION, SOLUTION INTRAVENOUS CONTINUOUS
Status: DISCONTINUED | OUTPATIENT
Start: 2022-12-02 | End: 2022-12-03

## 2022-12-02 RX ORDER — POTASSIUM CHLORIDE 20 MEQ/1
40 TABLET, EXTENDED RELEASE ORAL
Status: DISCONTINUED | OUTPATIENT
Start: 2022-12-02 | End: 2022-12-02

## 2022-12-02 RX ADMIN — OSELTAMIVIR PHOSPHATE 30 MG: 30 CAPSULE ORAL at 09:01

## 2022-12-02 RX ADMIN — SODIUM CHLORIDE, PRESERVATIVE FREE 10 ML: 5 INJECTION INTRAVENOUS at 22:22

## 2022-12-02 RX ADMIN — PANTOPRAZOLE SODIUM 40 MG: 40 TABLET, DELAYED RELEASE ORAL at 06:32

## 2022-12-02 RX ADMIN — HEPARIN SODIUM 5000 UNITS: 5000 INJECTION INTRAVENOUS; SUBCUTANEOUS at 17:24

## 2022-12-02 RX ADMIN — INSULIN LISPRO 2 UNITS: 100 INJECTION, SOLUTION INTRAVENOUS; SUBCUTANEOUS at 12:46

## 2022-12-02 RX ADMIN — ACETAMINOPHEN 650 MG: 325 TABLET ORAL at 06:36

## 2022-12-02 RX ADMIN — PIPERACILLIN AND TAZOBACTAM 2.25 G: 2; .25 INJECTION, POWDER, FOR SOLUTION INTRAVENOUS at 12:46

## 2022-12-02 RX ADMIN — SODIUM CHLORIDE 75 ML/HR: 9 INJECTION, SOLUTION INTRAVENOUS at 11:52

## 2022-12-02 RX ADMIN — ASPIRIN 81 MG: 81 TABLET, CHEWABLE ORAL at 08:54

## 2022-12-02 RX ADMIN — AMLODIPINE BESYLATE 5 MG: 5 TABLET ORAL at 08:54

## 2022-12-02 RX ADMIN — SODIUM CHLORIDE, PRESERVATIVE FREE 10 ML: 5 INJECTION INTRAVENOUS at 06:33

## 2022-12-02 RX ADMIN — ROSUVASTATIN CALCIUM 10 MG: 10 TABLET, COATED ORAL at 22:22

## 2022-12-02 RX ADMIN — HEPARIN SODIUM 5000 UNITS: 5000 INJECTION INTRAVENOUS; SUBCUTANEOUS at 02:42

## 2022-12-02 RX ADMIN — HEPARIN SODIUM 5000 UNITS: 5000 INJECTION INTRAVENOUS; SUBCUTANEOUS at 09:02

## 2022-12-02 RX ADMIN — PIPERACILLIN AND TAZOBACTAM 2.25 G: 2; .25 INJECTION, POWDER, FOR SOLUTION INTRAVENOUS at 06:32

## 2022-12-02 RX ADMIN — ACETAMINOPHEN 650 MG: 325 TABLET ORAL at 23:23

## 2022-12-02 RX ADMIN — SODIUM CHLORIDE, PRESERVATIVE FREE 10 ML: 5 INJECTION INTRAVENOUS at 14:00

## 2022-12-02 RX ADMIN — POTASSIUM CHLORIDE 20 MEQ: 1500 TABLET, EXTENDED RELEASE ORAL at 12:46

## 2022-12-02 RX ADMIN — MONTELUKAST 10 MG: 10 TABLET, FILM COATED ORAL at 08:54

## 2022-12-02 RX ADMIN — INSULIN LISPRO 2 UNITS: 100 INJECTION, SOLUTION INTRAVENOUS; SUBCUTANEOUS at 09:01

## 2022-12-02 RX ADMIN — INSULIN LISPRO 4 UNITS: 100 INJECTION, SOLUTION INTRAVENOUS; SUBCUTANEOUS at 22:33

## 2022-12-02 RX ADMIN — ROPINIROLE HYDROCHLORIDE 0.5 MG: 0.25 TABLET, FILM COATED ORAL at 22:22

## 2022-12-02 RX ADMIN — PIPERACILLIN AND TAZOBACTAM 2.25 G: 2; .25 INJECTION, POWDER, FOR SOLUTION INTRAVENOUS at 22:22

## 2022-12-02 NOTE — PROGRESS NOTES
Cardiology Progress Note        Patient: Laly Arias        Sex: female          DOA: 11/29/2022  YOB: 1945      Age:  68 y.o.        LOS:  LOS: 3 days     Patient seen and examined, chart reviewed. 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 Echocardiogram revealed       Left Ventricle: Normal left ventricular systolic function with a visually estimated EF of 60 - 65%. Left ventricle size is normal. Mildly increased wall thickness. Normal wall motion. Grade I diastolic dysfunction present with normal LV EF. Tricuspid Valve: Unable to assess RVSP due to inadequate or insignificant tricuspid regurgitation. Plan:     Continue aspirin, amlodipine and rosuvastatin. Monitor renal function   Further cardiac workup as clinically indicated              Subjective:    cc:   Denies any chest pain or shortness of breath      REVIEW OF SYSTEMS:     General: No fevers or chills. Cardiovascular: No chest pain,No palpitations, No orthopnea, No PND, No leg swelling, No claudication  Pulmonary: No  dyspnea.    Gastrointestinal: No nausea, vomiting, bleeding  Neurology: No Dizziness    Objective:      Visit Vitals  BP (!) 156/74 (BP 1 Location: Right upper arm, BP Patient Position: At rest)   Pulse (!) 108   Temp 97.6 °F (36.4 °C)   Resp 16   Ht 5' 5\" (1.651 m)   Wt 83.9 kg (185 lb)   SpO2 99%   Breastfeeding No   BMI 30.79 kg/m²     Body mass index is 30.79 kg/m². Physical Exam:  General Appearance: Comfortable, not using accessory muscles of respiration. HEENT: SHEELA. HEAD: Atraumatic  NECK: No JVD, no thyroidomeglay. CAROTIDS: no bruit  LUNGS: Clear bilaterally. HEART: S1+S2 audible, no murmur, no pericardial rub. ABD: Non-tender, BS Audible    EXT: No edema, and no cyanosis. VASCULAR EXAM: Pulses are intact. PSYCHIATRIC EXAM: Mood is appropriate. MUSCULOSKELETAL: Grossly no joint deformity.   NEUROLOGICAL: AAO times 3, No motor and sensory deficit    Medication:  Current Facility-Administered Medications   Medication Dose Route Frequency    rOPINIRole (REQUIP) tablet 0.5 mg  0.5 mg Oral DAILY    piperacillin-tazobactam (ZOSYN) 2.25 g in 0.9% sodium chloride (MBP/ADV) 50 mL MBP  2.25 g IntraVENous Q8H    oseltamivir (TAMIFLU) capsule 30 mg  30 mg Oral DAILY    aspirin chewable tablet 81 mg  81 mg Oral DAILY    montelukast (SINGULAIR) tablet 10 mg  10 mg Oral DAILY    rosuvastatin (CRESTOR) tablet 10 mg  10 mg Oral QHS    sodium chloride (NS) flush 5-40 mL  5-40 mL IntraVENous Q8H    sodium chloride (NS) flush 5-40 mL  5-40 mL IntraVENous PRN    acetaminophen (TYLENOL) tablet 650 mg  650 mg Oral Q6H PRN    Or    acetaminophen (TYLENOL) suppository 650 mg  650 mg Rectal Q6H PRN    bisacodyL (DULCOLAX) suppository 10 mg  10 mg Rectal DAILY PRN    promethazine (PHENERGAN) tablet 12.5 mg  12.5 mg Oral Q6H PRN    Or    ondansetron (ZOFRAN) injection 4 mg  4 mg IntraVENous Q6H PRN    heparin (porcine) injection 5,000 Units  5,000 Units SubCUTAneous Q8H    pantoprazole (PROTONIX) tablet 40 mg  40 mg Oral ACB    [Held by provider] insulin glargine (LANTUS) injection 10 Units  10 Units SubCUTAneous QHS    insulin lispro (HUMALOG) injection   SubCUTAneous AC&HS    glucose chewable tablet 16 g  4 Tablet Oral PRN    glucagon (GLUCAGEN) injection 1 mg  1 mg IntraMUSCular PRN    dextrose 10% infusion 0-250 mL  0-250 mL IntraVENous PRN    hydrALAZINE (APRESOLINE) 20 mg/mL injection 10 mg  10 mg IntraVENous Q6H PRN    amLODIPine (NORVASC) tablet 5 mg  5 mg Oral DAILY               Lab/Data Reviewed:       Recent Labs     12/01/22  0855 11/30/22  0437 11/29/22  1015   WBC 5.5 6.6 10.5   HGB 10.3* 11.6* 14.8   HCT 32.1* 35.7 43.6    158 178     Recent Labs     12/01/22  0855 11/30/22  0437 11/29/22  1015    136 131*   K 4.0 3.9 4.4    103 96*   CO2 22 19* 18*   * 141* 270*   BUN 61* 56* 52*   CREA 3.58* 3.02* 3.11*   CA 8.9 8.4* 9.0       Signed By: Deneen Berry MD     December 2, 2022

## 2022-12-02 NOTE — PROGRESS NOTES
D/c plan: Home w/ [address] Spouse to transport. CM discussed pt with Dr. Saleem Leo today. Anticipate d/c home in 24 to 48 hours pending improvement in renal labs. Pt will d/c home w/ [address] Spouse to transport. Care Management Interventions  PCP Verified by CM: Yes (Dr. Judy Hickey )  Palliative Care Criteria Met (RRAT>21 & CHF Dx)?: No  Mode of Transport at Discharge: Other (see comment) (Spouse)  Transition of Care Consult (CM Consult): [address]: Yes  Discharge Durable Medical Equipment:  (TBD.  Pt ambulates w/ a cane at baseline.)  Physical Therapy Consult: Yes  Occupational Therapy Consult: Yes  Speech Therapy Consult: No  Support Systems: Spouse/Significant Other  Confirm Follow Up Transport: Family  The Plan for Transition of Care is Related to the Following Treatment Goals : Home w/ [address]  The Patient and/or Patient Representative was Provided with a Choice of Provider and Agrees with the Discharge Plan?: Yes  Name of the Patient Representative Who was Provided with a Choice of Provider and Agrees with the Discharge Plan: Spouse: Hui Heredia  Freedom of Choice List was Provided with Basic Dialogue that Supports the Patient's Individualized Plan of Care/Goals, Treatment Preferences and Shares the Quality Data Associated with the Providers?: Yes ([address] )  Discharge Location  Patient Expects to be Discharged to[de-identified] Home

## 2022-12-02 NOTE — PROGRESS NOTES
Hospitalist Progress Note-critical care note     Patient: Mark Echavarria MRN: [id number]  CSN: [id number]    YOB: 1945  Age: 68 y.o. Sex: female    DOA: 11/29/2022 LOS:  LOS: 3 days            Chief complaint: colitis , dehydration, elevated trop uti , dm     Assessment/Plan         Hospital Problems  Date Reviewed: 6/7/2022            Codes Class Noted POA    Colitis ICD-10-CM: K52.9  ICD-9-CM: 558.9  11/30/2022 Unknown        * (Principal) Severe dehydration ICD-10-CM: E86.0  ICD-9-CM: 276.51  11/29/2022 Unknown        Hypotension ICD-10-CM: I95.9  ICD-9-CM: 458.9  11/29/2022 Unknown        Tachycardia ICD-10-CM: R00.0  ICD-9-CM: 785.0  11/29/2022 Unknown        Hypomagnesemia ICD-10-CM: E83.42  ICD-9-CM: 275.2  11/29/2022 Unknown        Elevated troponin ICD-10-CM: R77.8  ICD-9-CM: 790.6  11/29/2022 Unknown        UTI (urinary tract infection) ICD-10-CM: N39.0  ICD-9-CM: 599.0  11/29/2022 Unknown        Flu ICD-10-CM: J11.1  ICD-9-CM: 487.1  11/29/2022 Unknown        Diarrhea ICD-10-CM: R19.7  ICD-9-CM: 787.91  11/29/2022 Unknown        Dehydration ICD-10-CM: E86.0  ICD-9-CM: 276.51  11/19/2021 Yes        Hyponatremia ICD-10-CM: E87.1  ICD-9-CM: 276.1  11/19/2021 Yes        JOHNATHAN (acute kidney injury) ([facility] [address] ICD-10-CM: N17.9  ICD-9-CM: 584.9  11/19/2021 Yes        [facility] ICD-10-CM: E11.9  ICD-9-CM: 250.00  Unknown Yes        Hypertension ICD-10-CM: I10  ICD-9-CM: 401.9  Unknown Yes          Hypotension due to severe dehydration-resolved        Flu A  Tamiflu renal dose, droplet isolation  Will complete 5 days treatment       JOHNATHAN-cr mild improving discussed with renal, renal biopsy if not improving per Iv hydration   Renal ultrasound right kidney medical disease, moderate post void bladder residual -need urologist as out-pt.  Urine cath as needed, today's renal function still pending   Like due to dehydration, continue normal saline infusion, renal dose medication, avoid NSAIDs and IV contrast  Nephrologist seeing pt     Nausea vomiting diarrhea- resolved   Mild improving  appetite   Continue iv hydration   Change to clear diet to see if she can tolerates      UTI and colitiis   Continue zosyn      Hyponatremia, and hypomagnesemia-resolved   Continue monitoring     Severe dehydration  Due to GI loss, normal saline infusion     Tachycardia and elevated troponin   cardiologist on board   PVL no DVT, VQ scan negative for PE  Ef is good Grade I diastolic dysfunction present      Lactic acidosis \like due to dehydration -resolved         DM type II ,   -long term insulin at home  continue ssi       Subjective like the popsickle , would like ensure       Disposition :tbd,   Review of systems:    General: No fevers or chills. No appetite    Cardiovascular: No chest pain or pressure. No palpitations. Pulmonary: No shortness of breath. Gastrointestinal: + nausea,  no vomiting. Vital signs/Intake and Output:  Visit Vitals  /68 (BP 1 Location: Right upper arm, BP Patient Position: Sitting)   Pulse (!) 111   Temp 98.5 °F (36.9 °C)   Resp 17   Ht 5' 5\" (1.651 m)   Wt 83.9 kg (185 lb)   SpO2 99%   Breastfeeding No   BMI 30.79 kg/m²     Current Shift:  No intake/output data recorded. Last three shifts:  11/30 1901 - 12/02 0700  In: 1480 [P.O.:480; I.V.:1000]  Out: 1250 [Urine:1250]    Physical Exam:  General: WD, WN. Alert, cooperative, no acute distress    HEENT: NC, Atraumatic. PERRLA, anicteric sclerae. Lungs: CTA Bilaterally. No Wheezing/Rhonchi/Rales. Heart:  Rrr ,   No murmur, No Rubs, No Gallops  Abdomen: Soft, Non distended, Non tender. +Bowel sounds,   Extremities: No c/c/e  Psych:   Not anxious or agitated. Neurologic:  No acute neurological deficit.              Labs: Results:       Chemistry Recent Labs     12/02/22  0705 12/01/22  0855 11/30/22  0437   * 109* 141*    138 136   K 3.4* 4.0 3.9    106 103   CO2 21 22 19*   BUN 53* 61* 56*   CREA 3.40* 3.58* 3.02* CA 9.5 8.9 8.4*   AGAP 12 10 14   BUCR 16 17 19        CBC w/Diff Recent Labs     12/02/22  0705 12/01/22  0855 11/30/22  0437   WBC 5.6 5.5 6.6   RBC 3.88* 3.55* 4.01*   HGB 11.2* 10.3* 11.6*   HCT 34.8* 32.1* 35.7    152 158   GRANS 61 58 59   LYMPH 20* 19* 20*   EOS 0 0 0        Cardiac Enzymes No results for input(s): CPK, CKND1, KODAK in the last 72 hours. No lab exists for component: CKRMB, TROIP   Coagulation No results for input(s): PTP, INR, APTT, INREXT, INREXT in the last 72 hours. Lipid Panel Lab Results   Component Value Date/Time    Cholesterol, total 118 04/03/2010 06:10 AM    HDL Cholesterol 20 (L) 04/03/2010 06:10 AM    LDL, calculated 73 04/03/2010 06:10 AM    VLDL, calculated 25 04/03/2010 06:10 AM    Triglyceride 125 04/03/2010 06:10 AM    CHOL/HDL Ratio 5.9 (H) 04/03/2010 06:10 AM      BNP No results for input(s): BNPP in the last 72 hours. Liver Enzymes No results for input(s): TP, ALB, TBIL, AP in the last 72 hours. No lab exists for component: SGOT, GPT, DBIL     Thyroid Studies Lab Results   Component Value Date/Time    TSH 0.83 04/17/2010 06:10 PM        Procedures/imaging: see electronic medical records for all procedures/Xrays and details which were not copied into this note but were reviewed prior to creation of Plan    NM LUNG SCAN PERF    Result Date: 11/29/2022  EXAM: NUCLEAR MEDICINE PULMONARY PERFUSION SCAN CLINICAL INDICATION/HISTORY: High clinical suspicion for pulmonary embolism. Decreased oxygen saturation and recent travel. COMPARISON: None Available TECHNIQUE: No aerosolized 99mTc-DTPA ventilatory images of the lungs were obtained. After intravenous administration of 7.0 mCi 99mTc-MAA, perfusion images of the lungs were obtained in multiple projections.   Images are correlated with portable radiograph dated 11/29/2022.    > Injection site:  Left antecubital fossa ________________________________ FINDINGS: Perfusion images show symmetric radiotracer distribution to both lungs, with no segmental perfusion defects to suggest the presence of pulmonary embolism. ________________________________     No pulmonary embolism. Perfusion only scintigraphy was performed and correlated with radiographic evaluation of the chest. This exam is reported using a perfusion only modified PIOPED II interpretive criteria. CT CHEST ABD PELV WO CONT    Result Date: 11/29/2022  EXAM: CT CHEST, ABDOMEN AND PELVIS CLINICAL INDICATION/HISTORY: Shortness of breath. Diarrhea. COMPARISON: None TECHNIQUE: Axial CT imaging of the chest, abdomen, and pelvis was performed without intravenous contrast. Multiplanar reformats were generated. One or more dose reduction techniques were used on this CT: automated exposure control, adjustment of the mAs and/or kVp according to patient size, and iterative reconstruction techniques. The specific techniques used on this CT exam have been documented in the patient's electronic medical record. Digital Imaging and Communications in Medicine (DICOM) format image data are available to nonaffiliated external healthcare facilities or entities on a secure, media free, reciprocally searchable basis with patient authorization for at least a 12-month period after this study. ________________ FINDINGS: CHEST: LUNGS: Lung architecture appears normal. Mild bibasilar subsegmental atelectasis. No nodule/mass. PLEURA: Normal. AIRWAY: Normal. MEDIASTINUM: Normal heart size. No pericardial effusion. Severe coronary and aortic atherosclerotic calcifications. LYMPH NODES: No enlarged lymph nodes. OTHER: None. _______________ ABDOMEN/PELVIS: LIVER, BILIARY: Liver is normal. No biliary dilation. Cholecystectomy. SPLEEN: Normal. PANCREAS: Normal. ADRENALS: Normal. KIDNEYS/URETERS/BLADDER: Kidneys appear normal. Mild fat stranding around the urinary bladder. LYMPH NODES: No enlarged lymph nodes.  GASTROINTESTINAL TRACT: Edematous wall thickening of the colon extending from the distal transverse through sigmoid colon with mild surrounding fat stranding. No evidence of obstruction. VASCULATURE: Severe atherosclerotic calcifications. PELVIC ORGANS: Hysterectomy BONES: No acute or aggressive osseous abnormalities identified. OTHER: None. _______________     1. There is edematous wall thickening of the colon from the distal transverse through proximal sigmoid consistent with colitis. This may be infectious, inflammatory, or ischemic in etiology. 2. Mild fat stranding around urinary bladder, correlate for possible cystitis. XR CHEST PORT    Result Date: 11/29/2022  A portable AP radiograph of the chest was obtained at 1334 hours: INDICATION:  Nausea, vomiting, sepsis. COMPARISON:  Portable chest 4/21/2010. FINDINGS:  Heart and mediastinum: Unremarkable. Lungs and pleura: Previously noted infiltrates have resolved with no new infiltrates seen. Lungs are hypoinflated. Aorta: Aortic arch partially calcified. Bones: Within normal limits for age. Other: None. No significant abnormality. No new abnormality is seen developing since last study. DUPLEX LOWER EXT VENOUS BILAT    Result Date: 11/29/2022  · No evidence of deep vein thrombosis in the right lower extremity. · No evidence of deep vein thrombosis in the left lower extremity.         Cesar Farmer MD

## 2022-12-02 NOTE — PROGRESS NOTES
.Bedside, Verbal, and Written shift change report given to Monique Rosario RN (oncoming nurse) by Fawn Chong RN (offgoing nurse). Report included the following information SBAR, Kardex, and MAR.

## 2022-12-02 NOTE — PROGRESS NOTES
RENAL CONSULT  2022    Patient:  Cameron Rivera  :  1945  Gender:  female  MRN #:  [id number]    Consulting Physician:  Ailyn Mendez MD,    Assessment:   she is 68 yrs lady with hypertension, diabetes, presented to ER due to nausea , vomiting and diarrhea with decreased appetite for 2 or 3 days. She was found to have hypotension to 85/6-0 mmHg on arrival which improved after 2 liter fluid bolus . she has acute renal failure most likely ATN due to hypotension vs volume depletion      Acute renal failure  Hyponatremia  Influenza infection   UTI  Acidosis   Hypermagnesemia     Subjective:   She says she is urinating fine, feeling much better   No fever and difficulty breathing   No other complaints    PLAN:   - continue volume expansion , normal saline 75 cc/hour  for one more day   She has high post void residue in USG   Continue  bladder scan BID post void and straight cath for > 300 cc urinary retention   Please record urine output   Intake and output   Avoid nsaids , contrast and nephrotoxin  Dose all meds and antibiotics for current eGFR  No indication of dialysis   She has 3.7 gram proteinuria, repeat UA, if persistent will consider renal biopsy once infection resolve   Other management per primary team            Objective:  Visit Vitals  /68 (BP 1 Location: Right upper arm, BP Patient Position: Sitting)   Pulse (!) 111   Temp 98.5 °F (36.9 °C)   Resp 17   Ht 5' 5\" (1.651 m)   Wt 83.9 kg (185 lb)   SpO2 99%   Breastfeeding No   BMI 30.79 kg/m²         Well developed and groomed  Eyes: anicteric  No edema  Chest clear           Laboratory Data:  Lab Results   Component Value Date    BUN 53 (H) 2022    BUN 61 (H) 2022    BUN 56 (H) 2022     2022     2022     2022    CO2 21 2022    CO2 22 2022    CO2 19 (L) 2022     Lab Results   Component Value Date    WBC 5.6 2022    HGB 11.2 (L) 2022    HCT 34.8 (L) 12/02/2022     Imaging have been reviewed:    USG retroperitoneum- Mildly increased right renal cortical echogenicity which can reflect medical  renal disease. 2. Moderate post void bladder residual      I have reviewed patient's record for pertinent labs, radiology and other test . I have reviewed old records. I have ordered labs, medications and radiology as necessary and indicated per patient's condition . Total time spent is approximately 31 minutes. Greater than 50 % of that time was spent in counseling and or care coordination.          Crystal Heath MD,

## 2022-12-02 NOTE — PROGRESS NOTES
Physician Progress Note      Lyla Silva  CSN #:                  [id number]  :                       1945  ADMIT DATE:       2022 10:26 AM  DISCH DATE:  RESPONDING  PROVIDER #:        Citlali MARQUEZ MD          QUERY TEXT:    Pt admitted with severe dehydration. Noted documentation of \"s acute renal failure most likely ATN\" on 22 by Nephrology consultant. If possible, please document in progress notes and discharge summary:    The medical record reflects the following:  Risk Factors: Dehydration; N/V/D; not appetite  Clinical Indicators:  Nephrology note:  She was found to have hypotension to 85/6-0 mmHg on arrival which improved after 2 liter fluid bolus . she has acute renal failure most likely ATN due to hypotension vs volume depletion  Treatment: IVF; renal indices; Nephrology following    Thank you for your time,  Dorita Perez [email or web address]  Options provided:  -- ARF with ATN confirmed present on admission  -- Defer to Nephrology consultant documentation regarding ARF with ATN  -- Other - I will add my own diagnosis  -- Disagree - Not applicable / Not valid  -- Disagree - Clinically unable to determine / Unknown  -- Refer to Clinical Documentation Reviewer    PROVIDER RESPONSE TEXT:    I defer to Nephrology consultant regarding documentation of ARF with ATN.     Query created by: John Yanez on 2022 8:50 AM      Electronically signed by:  Citlali MARQUEZ MD 2022 4:12 PM

## 2022-12-03 LAB
ANION GAP SERPL CALC-SCNC: 12 MMOL/L (ref 3–18)
BASOPHILS # BLD: 0 K/UL (ref 0–0.1)
BASOPHILS NFR BLD: 0 % (ref 0–2)
BUN SERPL-MCNC: 46 MG/DL (ref 7–18)
BUN/CREAT SERPL: 16 (ref 12–20)
CALCIUM SERPL-MCNC: 8.8 MG/DL (ref 8.5–10.1)
CHLORIDE SERPL-SCNC: 109 MMOL/L (ref 100–111)
CO2 SERPL-SCNC: 21 MMOL/L (ref 21–32)
CREAT SERPL-MCNC: 2.87 MG/DL (ref 0.6–1.3)
DIFFERENTIAL METHOD BLD: ABNORMAL
EOSINOPHIL # BLD: 0 K/UL (ref 0–0.4)
EOSINOPHIL NFR BLD: 0 % (ref 0–5)
ERYTHROCYTE [DISTWIDTH] IN BLOOD BY AUTOMATED COUNT: 14.5 % (ref 11.6–14.5)
GLUCOSE BLD STRIP.AUTO-MCNC: 128 MG/DL (ref 70–110)
GLUCOSE BLD STRIP.AUTO-MCNC: 182 MG/DL (ref 70–110)
GLUCOSE BLD STRIP.AUTO-MCNC: 206 MG/DL (ref 70–110)
GLUCOSE BLD STRIP.AUTO-MCNC: 289 MG/DL (ref 70–110)
GLUCOSE SERPL-MCNC: 142 MG/DL (ref 74–99)
HCT VFR BLD AUTO: 32.9 % (ref 35–45)
HGB BLD-MCNC: 10.5 G/DL (ref 12–16)
IMM GRANULOCYTES # BLD AUTO: 0 K/UL (ref 0–0.04)
IMM GRANULOCYTES NFR BLD AUTO: 1 % (ref 0–0.5)
LYMPHOCYTES # BLD: 1.6 K/UL (ref 0.9–3.6)
LYMPHOCYTES NFR BLD: 23 % (ref 21–52)
MAGNESIUM SERPL-MCNC: 2.2 MG/DL (ref 1.6–2.6)
MCH RBC QN AUTO: 28.2 PG (ref 24–34)
MCHC RBC AUTO-ENTMCNC: 31.9 G/DL (ref 31–37)
MCV RBC AUTO: 88.4 FL (ref 78–100)
MONOCYTES # BLD: 1.4 K/UL (ref 0.05–1.2)
MONOCYTES NFR BLD: 21 % (ref 3–10)
NEUTS SEG # BLD: 3.7 K/UL (ref 1.8–8)
NEUTS SEG NFR BLD: 54 % (ref 40–73)
NRBC # BLD: 0 K/UL (ref 0–0.01)
NRBC BLD-RTO: 0 PER 100 WBC
PLATELET # BLD AUTO: 211 K/UL (ref 135–420)
PMV BLD AUTO: 10.2 FL (ref 9.2–11.8)
POTASSIUM SERPL-SCNC: 3.2 MMOL/L (ref 3.5–5.5)
RBC # BLD AUTO: 3.72 M/UL (ref 4.2–5.3)
SODIUM SERPL-SCNC: 142 MMOL/L (ref 136–145)
WBC # BLD AUTO: 6.7 K/UL (ref 4.6–13.2)

## 2022-12-03 PROCEDURE — 74011000250 HC RX REV CODE- 250: Performed by: HOSPITALIST

## 2022-12-03 PROCEDURE — 83735 ASSAY OF MAGNESIUM: CPT

## 2022-12-03 PROCEDURE — 74011250637 HC RX REV CODE- 250/637: Performed by: HOSPITALIST

## 2022-12-03 PROCEDURE — 74011000258 HC RX REV CODE- 258: Performed by: HOSPITALIST

## 2022-12-03 PROCEDURE — 80048 BASIC METABOLIC PNL TOTAL CA: CPT

## 2022-12-03 PROCEDURE — 85025 COMPLETE CBC W/AUTO DIFF WBC: CPT

## 2022-12-03 PROCEDURE — 82962 GLUCOSE BLOOD TEST: CPT

## 2022-12-03 PROCEDURE — 65270000046 HC RM TELEMETRY

## 2022-12-03 PROCEDURE — 74011250636 HC RX REV CODE- 250/636: Performed by: STUDENT IN AN ORGANIZED HEALTH CARE EDUCATION/TRAINING PROGRAM

## 2022-12-03 PROCEDURE — 97530 THERAPEUTIC ACTIVITIES: CPT

## 2022-12-03 PROCEDURE — 74011250637 HC RX REV CODE- 250/637: Performed by: EMERGENCY MEDICINE

## 2022-12-03 PROCEDURE — 74011636637 HC RX REV CODE- 636/637: Performed by: HOSPITALIST

## 2022-12-03 PROCEDURE — 74011250637 HC RX REV CODE- 250/637: Performed by: INTERNAL MEDICINE

## 2022-12-03 PROCEDURE — 97116 GAIT TRAINING THERAPY: CPT

## 2022-12-03 PROCEDURE — 36415 COLL VENOUS BLD VENIPUNCTURE: CPT

## 2022-12-03 PROCEDURE — 74011250636 HC RX REV CODE- 250/636: Performed by: HOSPITALIST

## 2022-12-03 RX ORDER — CIPROFLOXACIN 250 MG/1
250 TABLET, FILM COATED ORAL EVERY 12 HOURS
Status: DISCONTINUED | OUTPATIENT
Start: 2022-12-03 | End: 2022-12-04 | Stop reason: HOSPADM

## 2022-12-03 RX ORDER — POTASSIUM CHLORIDE 20 MEQ/1
40 TABLET, EXTENDED RELEASE ORAL DAILY
Status: DISCONTINUED | OUTPATIENT
Start: 2022-12-03 | End: 2022-12-04 | Stop reason: HOSPADM

## 2022-12-03 RX ADMIN — POTASSIUM CHLORIDE 40 MEQ: 1500 TABLET, EXTENDED RELEASE ORAL at 09:17

## 2022-12-03 RX ADMIN — PIPERACILLIN AND TAZOBACTAM 2.25 G: 2; .25 INJECTION, POWDER, FOR SOLUTION INTRAVENOUS at 05:29

## 2022-12-03 RX ADMIN — MONTELUKAST 10 MG: 10 TABLET, FILM COATED ORAL at 09:17

## 2022-12-03 RX ADMIN — OSELTAMIVIR PHOSPHATE 30 MG: 30 CAPSULE ORAL at 09:21

## 2022-12-03 RX ADMIN — ROSUVASTATIN CALCIUM 10 MG: 10 TABLET, COATED ORAL at 21:03

## 2022-12-03 RX ADMIN — CIPROFLOXACIN HYDROCHLORIDE 250 MG: 250 TABLET, FILM COATED ORAL at 20:49

## 2022-12-03 RX ADMIN — SODIUM CHLORIDE, PRESERVATIVE FREE 10 ML: 5 INJECTION INTRAVENOUS at 22:23

## 2022-12-03 RX ADMIN — HEPARIN SODIUM 5000 UNITS: 5000 INJECTION INTRAVENOUS; SUBCUTANEOUS at 18:11

## 2022-12-03 RX ADMIN — SODIUM CHLORIDE 75 ML/HR: 9 INJECTION, SOLUTION INTRAVENOUS at 01:55

## 2022-12-03 RX ADMIN — SODIUM CHLORIDE, PRESERVATIVE FREE 10 ML: 5 INJECTION INTRAVENOUS at 05:31

## 2022-12-03 RX ADMIN — INSULIN LISPRO 2 UNITS: 100 INJECTION, SOLUTION INTRAVENOUS; SUBCUTANEOUS at 18:11

## 2022-12-03 RX ADMIN — ACETAMINOPHEN 650 MG: 325 TABLET ORAL at 09:29

## 2022-12-03 RX ADMIN — SODIUM CHLORIDE, PRESERVATIVE FREE 10 ML: 5 INJECTION INTRAVENOUS at 16:14

## 2022-12-03 RX ADMIN — PANTOPRAZOLE SODIUM 40 MG: 40 TABLET, DELAYED RELEASE ORAL at 09:17

## 2022-12-03 RX ADMIN — CIPROFLOXACIN HYDROCHLORIDE 250 MG: 250 TABLET, FILM COATED ORAL at 09:17

## 2022-12-03 RX ADMIN — ROPINIROLE HYDROCHLORIDE 0.5 MG: 0.25 TABLET, FILM COATED ORAL at 22:22

## 2022-12-03 RX ADMIN — INSULIN LISPRO 4 UNITS: 100 INJECTION, SOLUTION INTRAVENOUS; SUBCUTANEOUS at 12:44

## 2022-12-03 RX ADMIN — INSULIN LISPRO 6 UNITS: 100 INJECTION, SOLUTION INTRAVENOUS; SUBCUTANEOUS at 21:03

## 2022-12-03 RX ADMIN — AMLODIPINE BESYLATE 5 MG: 5 TABLET ORAL at 09:17

## 2022-12-03 RX ADMIN — HEPARIN SODIUM 5000 UNITS: 5000 INJECTION INTRAVENOUS; SUBCUTANEOUS at 02:50

## 2022-12-03 RX ADMIN — HEPARIN SODIUM 5000 UNITS: 5000 INJECTION INTRAVENOUS; SUBCUTANEOUS at 09:17

## 2022-12-03 RX ADMIN — ACETAMINOPHEN 650 MG: 325 TABLET ORAL at 20:49

## 2022-12-03 RX ADMIN — ASPIRIN 81 MG: 81 TABLET, CHEWABLE ORAL at 09:17

## 2022-12-03 NOTE — PROGRESS NOTES
After 1st med pass and assessment, as I was setting the bed alarm, patient asked that I not set it. She stated that she will not get up and that it is very irritating to her when the alarm goes off as she moves around the bed. I stated that I was afraid to find her on the floor with an injury. She stated she will be ok and restated that she did not want the alarm on. Bed locked, lowered, call light and possessions in reach, patient taken to bed side commode-voided 150 mls of clear yellow urine-and returned to bed.  at bedside.

## 2022-12-03 NOTE — PROGRESS NOTES
Cardiology Progress Note        Patient: Cody Shelton        Sex: female          DOA: 11/29/2022  YOB: 1945      Age:  68 y.o.        LOS:  LOS: 3 days     Patient seen and examined, chart reviewed. 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 Echocardiogram revealed       Left Ventricle: Normal left ventricular systolic function with a visually estimated EF of 60 - 65%. Left ventricle size is normal. Mildly increased wall thickness. Normal wall motion. Grade I diastolic dysfunction present with normal LV EF. Tricuspid Valve: Unable to assess RVSP due to inadequate or insignificant tricuspid regurgitation. Plan:     Continue aspirin, amlodipine and rosuvastatin. Monitor renal function   Further cardiac workup as clinically indicated              Subjective:    cc:   Denies any chest pain or shortness of breath      REVIEW OF SYSTEMS:     General: No fevers or chills. Cardiovascular: No chest pain,No palpitations, No orthopnea, No PND, No leg swelling, No claudication  Pulmonary: No  dyspnea.    Gastrointestinal: No nausea, vomiting, bleeding  Neurology: No Dizziness    Objective:      Visit Vitals  /83 (BP 1 Location: Right upper arm, BP Patient Position: Sitting)   Pulse (!) 117   Temp 97.6 °F (36.4 °C)   Resp 16   Ht 5' 5\" (1.651 m)   Wt 83.9 kg (185 lb)   SpO2 100%   Breastfeeding No   BMI 30.79 kg/m²     Body mass index is 30.79 kg/m². Physical Exam:  General Appearance: Comfortable, not using accessory muscles of respiration. HEENT: SHEELA. HEAD: Atraumatic  NECK: No JVD, no thyroidomeglay. CAROTIDS: no bruit  LUNGS: Clear bilaterally. HEART: S1+S2 audible, no murmur, no pericardial rub. ABD: Non-tender, BS Audible    EXT: No edema, and no cyanosis. VASCULAR EXAM: Pulses are intact. PSYCHIATRIC EXAM: Mood is appropriate. MUSCULOSKELETAL: Grossly no joint deformity.   NEUROLOGICAL: AAO times 3, No motor and sensory deficit    Medication:  Current Facility-Administered Medications   Medication Dose Route Frequency    0.9% sodium chloride infusion  75 mL/hr IntraVENous CONTINUOUS    rOPINIRole (REQUIP) tablet 0.5 mg  0.5 mg Oral DAILY    piperacillin-tazobactam (ZOSYN) 2.25 g in 0.9% sodium chloride (MBP/ADV) 50 mL MBP  2.25 g IntraVENous Q8H    oseltamivir (TAMIFLU) capsule 30 mg  30 mg Oral DAILY    aspirin chewable tablet 81 mg  81 mg Oral DAILY    montelukast (SINGULAIR) tablet 10 mg  10 mg Oral DAILY    rosuvastatin (CRESTOR) tablet 10 mg  10 mg Oral QHS    sodium chloride (NS) flush 5-40 mL  5-40 mL IntraVENous Q8H    sodium chloride (NS) flush 5-40 mL  5-40 mL IntraVENous PRN    acetaminophen (TYLENOL) tablet 650 mg  650 mg Oral Q6H PRN    Or    acetaminophen (TYLENOL) suppository 650 mg  650 mg Rectal Q6H PRN    bisacodyL (DULCOLAX) suppository 10 mg  10 mg Rectal DAILY PRN    promethazine (PHENERGAN) tablet 12.5 mg  12.5 mg Oral Q6H PRN    Or    ondansetron (ZOFRAN) injection 4 mg  4 mg IntraVENous Q6H PRN    heparin (porcine) injection 5,000 Units  5,000 Units SubCUTAneous Q8H    pantoprazole (PROTONIX) tablet 40 mg  40 mg Oral ACB    [Held by provider] insulin glargine (LANTUS) injection 10 Units  10 Units SubCUTAneous QHS    insulin lispro (HUMALOG) injection   SubCUTAneous AC&HS    glucose chewable tablet 16 g  4 Tablet Oral PRN    glucagon (GLUCAGEN) injection 1 mg  1 mg IntraMUSCular PRN    dextrose 10% infusion 0-250 mL  0-250 mL IntraVENous PRN    hydrALAZINE (APRESOLINE) 20 mg/mL injection 10 mg  10 mg IntraVENous Q6H PRN    amLODIPine (NORVASC) tablet 5 mg  5 mg Oral DAILY               Lab/Data Reviewed:       Recent Labs     12/02/22  0705 12/01/22  0855 11/30/22  0437   WBC 5.6 5.5 6.6   HGB 11.2* 10.3* 11.6*   HCT 34.8* 32.1* 35.7    152 158       Recent Labs     12/02/22  0705 12/01/22  0855 11/30/22  0437    138 136   K 3.4* 4.0 3.9    106 103   CO2 21 22 19*   * 109* 141*   BUN 53* 61* 56*   CREA 3.40* 3.58* 3.02*   CA 9.5 8.9 8.4*         Signed By: Shiela Pham MD     December 2, 2022

## 2022-12-03 NOTE — PROGRESS NOTES
Alert and oriented x4, assessments and VS completed, pt has no reports of pain, accu checks completed, pt tolerated diet upgrade to full liquid.  Pt found eating solid food brought in by , this nurse educated patient about noncompliance,

## 2022-12-03 NOTE — PROGRESS NOTES
Problem: Mobility Impaired (Adult and Pediatric)  Goal: *Acute Goals and Plan of Care (Insert Text)  Description: Physical Therapy Goals   Initiated 11/30/2022 and to be accomplished within 7 day(s)  1. Patient will move from supine <> sit with mod I in prep for out of bed activity and change of position. 2.  Patient will perform sit<> stand with mod I with LRAD in prep for transfers/ambulation. 3.  Patient will transfer from bed <> chair with mod I with LRAD for time up in chair for completion of ADL activity. 4.  Patient will ambulate 100 feet with mod I/LRAD for improved functional mobility at discharge. 5.  Patient will ascend/descend 2 stairs with CGA for home re-entry as needed. Outcome: Progressing Towards Goal   []  Patient has met MD mobilization moses for d/c home   []  Recommend HH with 24 hour adult care   []  Benefit from additional acute PT session to address:      PHYSICAL THERAPY TREATMENT    Patient: Brit Tao (85 y.o. female)  Date: 12/3/2022  Diagnosis: Severe dehydration [E86.0] Severe dehydration    Precautions: Fall, Other (comment) (Droplet -Influenza A)  PLOF: ambulatory with a cane or walker PTA    ASSESSMENT:  Pt sitting up EOB upon arrival.  No difficulty performing sit to stand. Ambulated to the bathroom with RW, voided, independent wth zohra-care and undergarments. Ambulated 45ft total in the room, very slow pace, no LOB or path deviations. Left sitting EOB to eat lunch. Progression toward goals:   [x]      Improving appropriately and progressing toward goals  []      Improving slowly and progressing toward goals  []      Not making progress toward goals and plan of care will be adjusted     PLAN:  Patient continues to benefit from skilled intervention to address the above impairments. Continue treatment per established plan of care.   Discharge Recommendations: Home Physical Therapy  Further Equipment Recommendations for Discharge:  N/A    [facility]: 15/20    This AMPAC score should be considered in conjunction with interdisciplinary team recommendations to determine the most appropriate discharge setting. Patient's social support, diagnosis, medical stability, and prior level of function should also be taken into consideration. SUBJECTIVE:   Patient stated ok.     OBJECTIVE DATA SUMMARY:   Critical Behavior:  Neurologic State: Alert  Orientation Level: Oriented X4  Cognition: Appropriate decision making, Follows commands  Safety/Judgement: Awareness of environment  Functional Mobility Training:  Transfers:  Sit to Stand: Independent  Stand to Sit: Supervision  Balance:  Sitting: Intact  Standing: Intact; With support  Standing - Static: Good  Standing - Dynamic : Fair   Ambulation/Gait Training:  Distance (ft): 45 Feet (ft)  Assistive Device: Gait belt;Walker, rolling  Ambulation - Level of Assistance: Stand-by assistance;Supervision  Gait Abnormalities: Decreased step clearance  Speed/Margie: Slow  Step Length: Right shortened;Left shortened          Pain:  Pain level pre-treatment: 0/10  Pain level post-treatment: 0/10   Pain Intervention(s): Medication (see MAR); Rest, Ice, Repositioning   Response to intervention: Nurse notified, See doc flow    Activity Tolerance:   Fair+  Please refer to the flowsheet for vital signs taken during this treatment. After treatment:   [] Patient left in no apparent distress sitting up in chair  [x] Patient left in no apparent distress in bed  [x] Call bell left within reach  [] Nursing notified  [] Caregiver present  [] Bed alarm activated  [] SCDs applied      COMMUNICATION/EDUCATION:   [x]         Role of Physical Therapy in the acute care setting. [x]         Fall prevention education was provided and the patient/caregiver indicated understanding. [x]         Patient/family have participated as able in working toward goals and plan of care.   [x]         Patient/family agree to work toward stated goals and plan of care.  []         Patient understands intent and goals of therapy, but is neutral about his/her participation. []         Patient is unable to participate in stated goals/plan of care: ongoing with therapy staff.  []         Other:        Johnjuan miguel Jefferson PTA   Time Calculation: 59 mins    [employer] AM-PAC® Basic Mobility Inpatient Short Form (6-Clicks) Version 2    How much HELP from another person does the patient currently need    (If the patient hasn't done an activity recently, how much help from another person do you think he/she would need if he/she tried?)   Total (Total A or Dep)   A Lot  (Mod to Max A)   A Little (Sup or Min A)   None (Mod I to I)   Turning from your back to your side while in a flat bed without using bedrails? [] 1 [] 2 [x] 3 [] 4   2. Moving from lying on your back to sitting on the side of a flat bed without using bedrails? [] 1 [] 2 [x] 3 [] 4   3. Moving to and from a bed to a chair (including a wheelchair)? [] 1 [] 2 [x] 3 [] 4   4. Standing up from a chair using your arms (e.g., wheelchair, or bedside chair)? [] 1 [] 2 [x] 3 [] 4   5. Walking in hospital room? [] 1 [] 2 [x] 3 [] 4   6. Climbing 3-5 steps with a railing?+   [] 1 [] 2 [] 3 [] 4   +If stair climbing cannot be assessed, skip item #6. Sum responses from items 1-5. Based on an AM-PAC score of **/24 (or **/20 if omitting stairs) and their current functional mobility deficits, it is recommended that the patient have 5-7 sessions per week of Physical Therapy at d/c to increase the patient's independence. Currently, this patient demonstrates the potential endurance, and/or tolerance for 3 hours of therapy each day at d/c. Based on an AM-PAC score of **/24 (**/20 if omitting stairs) and their current functional mobility deficits, it is recommended that the patient have 3-5 sessions per week of Physical Therapy at d/c to increase the patient's independence.      Based on an AM-PAC score of **/24 (or 15/20 if omitting stairs) and their current functional mobility deficits, it is recommended that the patient have 2-3 sessions per week of Physical Therapy at d/c to increase the patient's independence. At this time and based on an AM-PAC score of **/24 (or **/20 if omitting stairs), no further PT is recommended upon discharge due to (i.e. patient at baseline functional statusetc). Recommend patient returns to prior setting with prior services.

## 2022-12-03 NOTE — PROGRESS NOTES
.Bedside, Verbal, and Written shift change report given to Amy Bell RN (oncoming nurse) by Loretta Moreno RN (offgoing nurse). Report included the following information SBAR, Kardex, and MAR.

## 2022-12-03 NOTE — ROUTINE PROCESS
Bedside and Verbal shift change report given to Amanda Lynch RN (oncoming nurse) by Loralie Severs, RN (offgoing nurse). Report included the following information SBAR and Kardex.

## 2022-12-03 NOTE — PROGRESS NOTES
Report received from [facility]. Pt sitting on side of bed. Educated to call for help to get OOB. Pt expressed understanding. Bed lowered, locked, alarm engaged for safety. Call light in reach.

## 2022-12-03 NOTE — PROGRESS NOTES
Hospitalist Progress Note    Patient: Jose Marie MRN: [id number]  CSN: [id number]    YOB: 1945  Age: 68 y.o. Sex: female    DOA: 11/29/2022 LOS:  LOS: 4 days            Assessment/Plan     Influenza A  JOHNATHAN  N/v/d- resolved  Ecoli UTI  Hyponatremia  Dehydration  Elevated trop- demand mismatch  Dm2  9. 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 Cardiovascular: No chest pain or pressure. No palpitations. Pulmonary: No shortness of breath. Gastrointestinal: No nausea, vomiting. Objective:        Vital signs/Intake and Output:  Visit Vitals  BP (!) 148/71   Pulse (!) 107   Temp 97.7 °F (36.5 °C)   Resp 18   Ht 5' 5\" (1.651 m)   Wt 83.9 kg (185 lb)   SpO2 100%   Breastfeeding No   BMI 30.79 kg/m²     Current Shift:  No intake/output data recorded. Last three shifts:  12/01 1901 - 12/03 0700  In: 480 [P.O.:480]  Out: 1650 [Urine:1650]    Body mass index is 30.79 kg/m².     Physical Exam:  GEN: Alert and oriented times three NAD  EYES: conjunctiva normal, lids with out lesions  HEENT: MMM, No thyromegaly, no lymphadenopathy  HEART: RRR +S1 +S2, no JVD, pulses 2+ distally  LUNGS: CTA B/L no wheezes, rales or rhonchi  ABDOMEN: + BS, soft NT/ND no organomegaly,  No rebound  EXTREMITIES: No edema cyanosis, cap refill normal   SKIN: no rashes or skin breakdown, no nodules, normal turgor  Current Facility-Administered Medications   Medication Dose Route Frequency    potassium chloride (K-DUR, KLOR-CON M20) SR tablet 40 mEq  40 mEq Oral DAILY    ciprofloxacin HCl (CIPRO) tablet 250 mg  250 mg Oral Q12H    0.9% sodium chloride infusion  75 mL/hr IntraVENous CONTINUOUS    rOPINIRole (REQUIP) tablet 0.5 mg  0.5 mg Oral DAILY    oseltamivir (TAMIFLU) capsule 30 mg  30 mg Oral DAILY    aspirin chewable tablet 81 mg  81 mg Oral DAILY    montelukast (SINGULAIR) tablet 10 mg  10 mg Oral DAILY    rosuvastatin (CRESTOR) tablet 10 mg  10 mg Oral QHS    sodium chloride (NS) flush 5-40 mL  5-40 mL IntraVENous Q8H    sodium chloride (NS) flush 5-40 mL  5-40 mL IntraVENous PRN    acetaminophen (TYLENOL) tablet 650 mg  650 mg Oral Q6H PRN    Or    acetaminophen (TYLENOL) suppository 650 mg  650 mg Rectal Q6H PRN    bisacodyL (DULCOLAX) suppository 10 mg  10 mg Rectal DAILY PRN    promethazine (PHENERGAN) tablet 12.5 mg  12.5 mg Oral Q6H PRN    Or    ondansetron (ZOFRAN) injection 4 mg  4 mg IntraVENous Q6H PRN    heparin (porcine) injection 5,000 Units  5,000 Units SubCUTAneous Q8H    pantoprazole (PROTONIX) tablet 40 mg  40 mg Oral ACB    [Held by provider] insulin glargine (LANTUS) injection 10 Units  10 Units SubCUTAneous QHS    insulin lispro (HUMALOG) injection   SubCUTAneous AC&HS    glucose chewable tablet 16 g  4 Tablet Oral PRN    glucagon (GLUCAGEN) injection 1 mg  1 mg IntraMUSCular PRN    dextrose 10% infusion 0-250 mL  0-250 mL IntraVENous PRN    hydrALAZINE (APRESOLINE) 20 mg/mL injection 10 mg  10 mg IntraVENous Q6H PRN    amLODIPine (NORVASC) tablet 5 mg  5 mg Oral DAILY         All the patient's labs over the past 24 hours were reviewed both during my initial daily workflow process and at the time notated as \"note time\" in [facility]. (It is not time stamped separately in this workflow.)  Select labs are listed below.         Labs: Results:       Chemistry Recent Labs     12/03/22  0334 12/02/22  0705 12/01/22  0855   * 152* 109*    140 138   K 3.2* 3.4* 4.0    107 106   CO2 21 21 22   BUN 46* 53* 61*   CREA 2.87* 3.40* 3.58*   CA 8.8 9.5 8.9   AGAP 12 12 10   BUCR 16 16 17      CBC w/Diff Recent Labs     12/03/22  0334 12/02/22  0705 12/01/22  0855   WBC 6.7 5.6 5.5   RBC 3.72* 3.88* 3.55*   HGB 10.5* 11.2* 10.3*   HCT 32.9* 34.8* 32.1*    179 152   GRANS 54 61 58   LYMPH 23 20* 19*   EOS 0 0 0              Lipid Panel Lab Results   Component Value Date/Time    Cholesterol, total 118 04/03/2010 06:10 AM    HDL Cholesterol 20 (L) 04/03/2010 06:10 AM    LDL, calculated 73 04/03/2010 06:10 AM    VLDL, calculated 25 04/03/2010 06:10 AM    Triglyceride 125 04/03/2010 06:10 AM    CHOL/HDL Ratio 5.9 (H) 04/03/2010 06:10 AM              Thyroid Studies Lab Results   Component Value Date/Time    TSH 0.83 04/17/2010 06:10 PM        Procedures/imaging: see electronic medical records for all procedures/Xrays and details which were not copied into this note but were reviewed prior to creation of Minh Jackman DO  Internal Medicine/Geriatrics

## 2022-12-03 NOTE — PROGRESS NOTES
Pt refused PT due to:    [x]  Mobilizing with CNA in room. Preparing for vitals. (1st attempt)  [x]  Eating. RN discussing consumption of food from outside of hospital and sodium intake. (2nd attempt)  []  Pain  []  Pt lethargic  []  Off Unit  Will f/u tomorrow. Thank you.   Kriss Dias, PTA

## 2022-12-03 NOTE — PROGRESS NOTES
RENAL CONSULT  12/3/2022    Patient:  Porsha Donnelly  :  1945  Gender:  female  MRN #:  [id number]    Consulting Physician:  Jody Sauer MD,    Assessment:   she is 68 yrs lady with hypertension, diabetes, presented to ER due to nausea , vomiting and diarrhea with decreased appetite for 2 or 3 days. She was found to have hypotension to 85/6-0 mmHg on arrival which improved after 2 liter fluid bolus . she has acute renal failure most likely ATN due to hypotension vs volume depletion      Acute renal failure  Hyponatremia-improved   Influenza infection   UTI  Acidosis - improved   Hypermagnesemia     Subjective:   She says she is urinating fine, feeling much better , denied shortness of breath   No fever and difficulty breathing   No other complaints    PLAN:   - decent urine output , down trending creatinine , will stop normal saline infusion   Encourage oral water intake   She has high post void residue in USG   Continue  bladder scan BID post void and straight cath for > 300 cc urinary retention   Please record urine output   Intake and output   Avoid nsaids , contrast and nephrotoxin  Dose all meds and antibiotics for current eGFR  No indication of dialysis   She has 3.7 gram proteinuria, repeat UA, if persistent will consider renal biopsy once infection resolve   Other management per primary team            Objective:  Visit Vitals  BP (!) 143/84   Pulse (!) 111   Temp 97.9 °F (36.6 °C)   Resp 18   Ht 5' 5\" (1.651 m)   Wt 83.9 kg (185 lb)   SpO2 97%   Breastfeeding No   BMI 30.79 kg/m²         Well developed and groomed  Eyes: anicteric  No edema  Chest clear  to auscultation           Laboratory Data:  Lab Results   Component Value Date    BUN 46 (H) 2022    BUN 53 (H) 2022    BUN 61 (H) 2022     2022     2022     2022    CO2 21 2022    CO2 21 2022    CO2 22 2022     Lab Results   Component Value Date    WBC 6.7 2022 HGB 10.5 (L) 12/03/2022    HCT 32.9 (L) 12/03/2022     Imaging have been reviewed:    USG retroperitoneum- Mildly increased right renal cortical echogenicity which can reflect medical  renal disease. 2. Moderate post void bladder residual      I have reviewed patient's record for pertinent labs, radiology and other test . I have reviewed old records. I have ordered labs, medications and radiology as necessary and indicated per patient's condition . Total time spent is approximately 28 minutes. Greater than 50 % of that time was spent in counseling and or care coordination.          Robbin Kehr, MD,

## 2022-12-04 VITALS
HEART RATE: 100 BPM | OXYGEN SATURATION: 100 % | HEIGHT: 65 IN | DIASTOLIC BLOOD PRESSURE: 67 MMHG | SYSTOLIC BLOOD PRESSURE: 143 MMHG | TEMPERATURE: 98.1 F | WEIGHT: 185 LBS | BODY MASS INDEX: 30.82 KG/M2 | RESPIRATION RATE: 16 BRPM

## 2022-12-04 LAB
ANION GAP SERPL CALC-SCNC: 10 MMOL/L (ref 3–18)
BASOPHILS # BLD: 0 K/UL (ref 0–0.1)
BASOPHILS NFR BLD: 0 % (ref 0–2)
BUN SERPL-MCNC: 34 MG/DL (ref 7–18)
BUN/CREAT SERPL: 14 (ref 12–20)
CALCIUM SERPL-MCNC: 9.6 MG/DL (ref 8.5–10.1)
CHLORIDE SERPL-SCNC: 111 MMOL/L (ref 100–111)
CO2 SERPL-SCNC: 22 MMOL/L (ref 21–32)
CREAT SERPL-MCNC: 2.37 MG/DL (ref 0.6–1.3)
DIFFERENTIAL METHOD BLD: ABNORMAL
EOSINOPHIL # BLD: 0.1 K/UL (ref 0–0.4)
EOSINOPHIL NFR BLD: 1 % (ref 0–5)
ERYTHROCYTE [DISTWIDTH] IN BLOOD BY AUTOMATED COUNT: 14.6 % (ref 11.6–14.5)
GLUCOSE BLD STRIP.AUTO-MCNC: 165 MG/DL (ref 70–110)
GLUCOSE SERPL-MCNC: 183 MG/DL (ref 74–99)
HCT VFR BLD AUTO: 31.7 % (ref 35–45)
HGB BLD-MCNC: 10.3 G/DL (ref 12–16)
IMM GRANULOCYTES # BLD AUTO: 0 K/UL
IMM GRANULOCYTES NFR BLD AUTO: 0 %
LYMPHOCYTES # BLD: 1.1 K/UL (ref 0.9–3.6)
LYMPHOCYTES NFR BLD: 15 % (ref 21–52)
MAGNESIUM SERPL-MCNC: 1.8 MG/DL (ref 1.6–2.6)
MCH RBC QN AUTO: 29 PG (ref 24–34)
MCHC RBC AUTO-ENTMCNC: 32.5 G/DL (ref 31–37)
MCV RBC AUTO: 89.3 FL (ref 78–100)
MONOCYTES # BLD: 1.6 K/UL (ref 0.05–1.2)
MONOCYTES NFR BLD: 21 % (ref 3–10)
NEUTS BAND NFR BLD MANUAL: 1 % (ref 0–5)
NEUTS SEG # BLD: 4.6 K/UL (ref 1.8–8)
NEUTS SEG NFR BLD: 62 % (ref 40–73)
NRBC # BLD: 0 K/UL (ref 0–0.01)
NRBC BLD-RTO: 0 PER 100 WBC
PLATELET # BLD AUTO: 285 K/UL (ref 135–420)
PLATELET COMMENTS,PCOM: ABNORMAL
PMV BLD AUTO: 10.3 FL (ref 9.2–11.8)
POTASSIUM SERPL-SCNC: 3.7 MMOL/L (ref 3.5–5.5)
RBC # BLD AUTO: 3.55 M/UL (ref 4.2–5.3)
RBC MORPH BLD: ABNORMAL
SODIUM SERPL-SCNC: 143 MMOL/L (ref 136–145)
WBC # BLD AUTO: 7.4 K/UL (ref 4.6–13.2)

## 2022-12-04 PROCEDURE — 36415 COLL VENOUS BLD VENIPUNCTURE: CPT

## 2022-12-04 PROCEDURE — 74011250636 HC RX REV CODE- 250/636: Performed by: HOSPITALIST

## 2022-12-04 PROCEDURE — 74011250637 HC RX REV CODE- 250/637: Performed by: INTERNAL MEDICINE

## 2022-12-04 PROCEDURE — 85025 COMPLETE CBC W/AUTO DIFF WBC: CPT

## 2022-12-04 PROCEDURE — 74011000250 HC RX REV CODE- 250: Performed by: HOSPITALIST

## 2022-12-04 PROCEDURE — 80048 BASIC METABOLIC PNL TOTAL CA: CPT

## 2022-12-04 PROCEDURE — 74011250637 HC RX REV CODE- 250/637: Performed by: HOSPITALIST

## 2022-12-04 PROCEDURE — 74011636637 HC RX REV CODE- 636/637: Performed by: HOSPITALIST

## 2022-12-04 PROCEDURE — 83735 ASSAY OF MAGNESIUM: CPT

## 2022-12-04 PROCEDURE — 82962 GLUCOSE BLOOD TEST: CPT

## 2022-12-04 RX ORDER — CIPROFLOXACIN 250 MG/1
250 TABLET, FILM COATED ORAL EVERY 12 HOURS
Qty: 2 TABLET | Refills: 0 | Status: SHIPPED | OUTPATIENT
Start: 2022-12-04 | End: 2022-12-05

## 2022-12-04 RX ORDER — AMLODIPINE BESYLATE 5 MG/1
5 TABLET ORAL DAILY
Qty: 30 TABLET | Refills: 0 | Status: SHIPPED | OUTPATIENT
Start: 2022-12-04

## 2022-12-04 RX ADMIN — ASPIRIN 81 MG: 81 TABLET, CHEWABLE ORAL at 09:10

## 2022-12-04 RX ADMIN — PANTOPRAZOLE SODIUM 40 MG: 40 TABLET, DELAYED RELEASE ORAL at 09:10

## 2022-12-04 RX ADMIN — INSULIN LISPRO 2 UNITS: 100 INJECTION, SOLUTION INTRAVENOUS; SUBCUTANEOUS at 09:10

## 2022-12-04 RX ADMIN — MONTELUKAST 10 MG: 10 TABLET, FILM COATED ORAL at 09:10

## 2022-12-04 RX ADMIN — AMLODIPINE BESYLATE 5 MG: 5 TABLET ORAL at 09:10

## 2022-12-04 RX ADMIN — SODIUM CHLORIDE, PRESERVATIVE FREE 10 ML: 5 INJECTION INTRAVENOUS at 06:00

## 2022-12-04 RX ADMIN — HEPARIN SODIUM 5000 UNITS: 5000 INJECTION INTRAVENOUS; SUBCUTANEOUS at 02:45

## 2022-12-04 RX ADMIN — HEPARIN SODIUM 5000 UNITS: 5000 INJECTION INTRAVENOUS; SUBCUTANEOUS at 09:10

## 2022-12-04 RX ADMIN — POTASSIUM CHLORIDE 40 MEQ: 1500 TABLET, EXTENDED RELEASE ORAL at 09:10

## 2022-12-04 RX ADMIN — CIPROFLOXACIN HYDROCHLORIDE 250 MG: 250 TABLET, FILM COATED ORAL at 09:10

## 2022-12-04 NOTE — DISCHARGE SUMMARY
Discharge Summary  Subjective:       Admit Date: 11/29/2022  Discharge Date:  12/4/2022, 9:06 AM    Discharging Physician: Eliel Cruz pager [phone]  Primary Care Provider:  Shlomo Faye MD    Patient ID:  Shayy Jimenez  68 y.o. female  1945    Reason for Admission:  Severe dehydration [E86.0]    Discharge Diagnosis:   Influenza A  JOHNATHAN  N/v/d- resolved  Ecoli UTI  Hyponatremia  Dehydration  Elevated trop- demand mismatch  Dm2  9. 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 female with history of hypertension, diabetes, presented to ER due to nausea vomiting and the diarrhea with decreased appetite for 2 or 3 days. She reported that her diarrhea mainly yesterday. It was watery diarrhea low blood. She presented hypotension and tachycardia. Sepsis protocol started. Her hypotension resolved upper 2 L normal saline. She still presented with tachycardia, her magnesium was 1.5. Creatinine is 3.1. She recent visit for diabetes at [place]. VQ scan was done in ER which is negative for PE, PVL was negative for DVT. Flu  A is positive. She also have mild elevated troponin. She denies any chest pain     Denies any slurred speech/headache/cp/n/v/blurred vision/palpitation/gait change/bleeding. Denies smoking/ any alcohol or drug use. \"    She was admitted to the hospitalist service. She was given tamiflu, antibiotics and IV fluids. Her clinical status improved and her renal function improved. Her Na and K were normal at time of DC. Her cr has not normalized but is continuing to trend downwards and she is stable for DC home w out pt follow up. Red flags regarding when to seek emergent care was discussed. Pertinent Imaging/ Operative procedures:   CT chest/ab/pelvis:\"    1. There is edematous wall thickening of the colon from the distal transverse  through proximal sigmoid consistent with colitis. This may be infectious,  inflammatory, or ischemic in etiology. 2. Mild fat stranding around urinary bladder, correlate for possible cystitis. \"      VQ:\"    No pulmonary embolism. \"    2d echo:\"   Left Ventricle: Normal left ventricular systolic function with a visually estimated EF of 60 - 65%. Left ventricle size is normal. Mildly increased wall thickness. Normal wall motion. Grade I diastolic dysfunction present with normal LV EF. Tricuspid Valve: Unable to assess RVSP due to inadequate or insignificant tricuspid regurgitation. \"      Pertinent Results:    CMP:   Lab Results   Component Value Date/Time     12/04/2022 07:10 AM    K 3.7 12/04/2022 07:10 AM     12/04/2022 07:10 AM    CO2 22 12/04/2022 07:10 AM    AGAP 10 12/04/2022 07:10 AM     (H) 12/04/2022 07:10 AM    BUN 34 (H) 12/04/2022 07:10 AM    CREA 2.37 (H) 12/04/2022 07:10 AM    CA 9.6 12/04/2022 07:10 AM    MG 1.8 12/04/2022 07:10 AM     CBC:   Lab Results   Component Value Date/Time    WBC 7.4 12/04/2022 07:10 AM    HGB 10.3 (L) 12/04/2022 07:10 AM    HCT 31.7 (L) 12/04/2022 07:10 AM     12/04/2022 07:10 AM         Physical Exam on Discharge:  Visit Vitals  BP (!) 143/67   Pulse 100   Temp 98.1 °F (36.7 °C)   Resp 16   Ht 5' 5\" (1.651 m)   Wt 83.9 kg (185 lb)   SpO2 100%   Breastfeeding No   BMI 30.79 kg/m²     Body mass index is 30.79 kg/m².   GEN: NAD  HEART: S1 S2  NEURO: nonfocal   Condition at discharge: stable    Discharge Medications  Current Discharge Medication List        START taking these medications    Details   amLODIPine (NORVASC) 5 mg tablet Take 1 Tablet by mouth daily. Qty: 30 Tablet, Refills: 0      ciprofloxacin HCl (CIPRO) 250 mg tablet Take 1 Tablet by mouth every twelve (12) hours for 2 doses. Qty: 2 Tablet, Refills: 0           CONTINUE these medications which have NOT CHANGED    Details   brimonidine-timoloL (COMBIGAN) 0.2-0.5 % drop ophthalmic solution Administer 1 Drop to both eyes every twelve (12) hours. 2 drops in am and 2 drops HS      aspirin 81 mg chewable tablet Take 81 mg by mouth daily. exenatide microspheres (BYDUREON SC) by SubCUTAneous route. Takes q sunday      insulin aspart protamine/insulin aspart (NovoLOG Mix 70-30FlexPen U-100) 100 unit/mL (70-30) inpn by SubCUTAneous route. 30 units in AM    40 units at night. SITagliptin (Januvia) 25 mg tablet Take 25 mg by mouth daily. rosuvastatin (Crestor) 10 mg tablet Take 10 mg by mouth nightly. montelukast (SINGULAIR) 10 mg tablet Take 10 mg by mouth daily. eye lubricant combination no.1 2-0.9-1.8 % drop Apply  to eye. rOPINIRole (Requip) 1 mg tablet Take 0.5 mg by mouth daily.  nightly 0.5mg at 4pm, and 1mg at night  Indications: restless legs syndrome, an extreme discomfort in the calf muscles when sitting or lying down           STOP taking these medications       lisinopriL (PRINIVIL, ZESTRIL) 20 mg tablet Comments:   Reason for Stopping:               Disposition: home   Follow up:  pcp  Restrictions: none    Diagnostic Imaging/Labs pending at discharge: none      Reid Richards, [facility]  Internal Medicine/Geriatrics    Time Spent 45 minutes with >50% coordination of care          CC: Alyson Woodward MD

## 2022-12-04 NOTE — PROGRESS NOTES
RENAL CONSULT  2022    Patient:  Lenore Garcia  :  1945  Gender:  female  MRN #:  [id number]    Consulting Physician:  Aris Christiansen MD,    Assessment:   she is 68 yrs lady with hypertension, diabetes, presented to ER due to nausea , vomiting and diarrhea with decreased appetite for 2 or 3 days. She was found to have hypotension to 85/6-0 mmHg on arrival which improved after 2 liter fluid bolus . she has acute renal failure most likely ATN due to hypotension vs volume depletion      Acute renal failure  Hyponatremia-improved   Influenza infection   UTI  Acidosis - improved   Hypermagnesemia     Subjective:   She says she is urinating fine, feeling much better , denied shortness of breath and wants to go home  No other complaints    PLAN:   - Renal function is improving slowly   Encourage oral water intake   She has high post void residue in USG   Continue  bladder scan BID post void and straight cath for > 300 cc urinary retention   Please record urine output   Intake and output   Avoid nsaids , contrast and nephrotoxin  Dose all meds and antibiotics for current eGFR  No indication of dialysis   She has 3.7 gram proteinuria,  if persistent will consider renal biopsy as outpatient ,  once infection resolve   Other management per primary team     If she  goes home need to check BMP in 1 week and follow up in nephrology clinic in 3-4 weeks        Objective:  Visit Vitals  BP (!) 143/67   Pulse 100   Temp 98.1 °F (36.7 °C)   Resp 16   Ht 5' 5\" (1.651 m)   Wt 83.9 kg (185 lb)   SpO2 100%   Breastfeeding No   BMI 30.79 kg/m²         Well developed and groomed  No neck swelling   No pedal edema  Chest clear  to auscultation           Laboratory Data:  Lab Results   Component Value Date    BUN 34 (H) 2022    BUN 46 (H) 2022    BUN 53 (H) 2022     2022     2022     2022    CO2 22 2022    CO2 21 2022    CO2 21 2022     Lab Results Component Value Date    WBC 7.4 12/04/2022    HGB 10.3 (L) 12/04/2022    HCT 31.7 (L) 12/04/2022     Imaging have been reviewed:    USG retroperitoneum- Mildly increased right renal cortical echogenicity which can reflect medical  renal disease. 2. Moderate post void bladder residual      I have reviewed patient's record for pertinent labs, radiology and other test . I have reviewed old records. I have ordered labs, medications and radiology as necessary and indicated per patient's condition . Total time spent is approximately 28 minutes. Greater than 50 % of that time was spent in counseling and or care coordination.          Yadi Walden MD,

## 2022-12-04 NOTE — PROGRESS NOTES
Bedside and Verbal shift change report given to Sharon Fuentes (oncoming nurse) by Hadley Luong (offgoing nurse). Report included the following information SBAR, Kardex, Intake/Output, MAR, Recent Results, and Cardiac Rhythm ST asymptomatic .

## 2022-12-04 NOTE — PROGRESS NOTES
Cardiology Progress Note        Patient: Karen Hu        Sex: female          DOA: 11/29/2022  YOB: 1945      Age:  68 y.o.        LOS:  LOS: 4 days     Patient seen and examined, chart reviewed. 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 Echocardiogram revealed       Left Ventricle: Normal left ventricular systolic function with a visually estimated EF of 60 - 65%. Left ventricle size is normal. Mildly increased wall thickness. Normal wall motion. Grade I diastolic dysfunction present with normal LV EF. Tricuspid Valve: Unable to assess RVSP due to inadequate or insignificant tricuspid regurgitation. Plan:     Continue aspirin, amlodipine and rosuvastatin. Monitor renal function                 Subjective:    cc:   Denies any chest pain or shortness of breath      REVIEW OF SYSTEMS:     General: No fevers or chills. Cardiovascular: No chest pain,No palpitations, No orthopnea, No PND, No leg swelling, No claudication  Pulmonary: No  dyspnea. Gastrointestinal: No nausea, vomiting, bleeding  Neurology: No Dizziness    Objective:      Visit Vitals  BP (!) 150/69   Pulse (!) 105   Temp 98.3 °F (36.8 °C)   Resp 20   Ht 5' 5\" (1.651 m)   Wt 83.9 kg (185 lb)   SpO2 100%   Breastfeeding No   BMI 30.79 kg/m²     Body mass index is 30.79 kg/m².     Physical Exam:  General Appearance: Comfortable, not using accessory muscles of respiration. HEENT: SHEELA. HEAD: Atraumatic  NECK: No JVD, no thyroidomeglay. CAROTIDS: no bruit  LUNGS: Clear bilaterally. HEART: S1+S2 audible, no murmur, no pericardial rub. ABD: Non-tender, BS Audible    EXT: No edema, and no cyanosis. VASCULAR EXAM: Pulses are intact. PSYCHIATRIC EXAM: Mood is appropriate. MUSCULOSKELETAL: Grossly no joint deformity.   NEUROLOGICAL: AAO times 3, No motor and sensory deficit    Medication:  Current Facility-Administered Medications   Medication Dose Route Frequency    potassium chloride (K-DUR, KLOR-CON M20) SR tablet 40 mEq  40 mEq Oral DAILY    ciprofloxacin HCl (CIPRO) tablet 250 mg  250 mg Oral Q12H    rOPINIRole (REQUIP) tablet 0.5 mg  0.5 mg Oral DAILY    aspirin chewable tablet 81 mg  81 mg Oral DAILY    montelukast (SINGULAIR) tablet 10 mg  10 mg Oral DAILY    rosuvastatin (CRESTOR) tablet 10 mg  10 mg Oral QHS    sodium chloride (NS) flush 5-40 mL  5-40 mL IntraVENous Q8H    sodium chloride (NS) flush 5-40 mL  5-40 mL IntraVENous PRN    acetaminophen (TYLENOL) tablet 650 mg  650 mg Oral Q6H PRN    Or    acetaminophen (TYLENOL) suppository 650 mg  650 mg Rectal Q6H PRN    bisacodyL (DULCOLAX) suppository 10 mg  10 mg Rectal DAILY PRN    promethazine (PHENERGAN) tablet 12.5 mg  12.5 mg Oral Q6H PRN    Or    ondansetron (ZOFRAN) injection 4 mg  4 mg IntraVENous Q6H PRN    heparin (porcine) injection 5,000 Units  5,000 Units SubCUTAneous Q8H    pantoprazole (PROTONIX) tablet 40 mg  40 mg Oral ACB    [Held by provider] insulin glargine (LANTUS) injection 10 Units  10 Units SubCUTAneous QHS    insulin lispro (HUMALOG) injection   SubCUTAneous AC&HS    glucose chewable tablet 16 g  4 Tablet Oral PRN    glucagon (GLUCAGEN) injection 1 mg  1 mg IntraMUSCular PRN    dextrose 10% infusion 0-250 mL  0-250 mL IntraVENous PRN    hydrALAZINE (APRESOLINE) 20 mg/mL injection 10 mg  10 mg IntraVENous Q6H PRN    amLODIPine (NORVASC) tablet 5 mg  5 mg Oral DAILY               Lab/Data Reviewed:       Recent Labs     12/03/22  0334 12/02/22  0705 12/01/22  0855   WBC 6.7 5.6 5.5   HGB 10.5* 11.2* 10.3*   HCT 32.9* 34.8* 32.1*    179 152       Recent Labs     12/03/22  0334 12/02/22  0705 12/01/22  0855    140 138   K 3.2* 3.4* 4.0    107 106   CO2 21 21 22   * 152* 109*   BUN 46* 53* 61*   CREA 2.87* 3.40* 3.58*   CA 8.8 9.5 8.9         Signed By: Daquan Lezama MD     December 3, 2022

## 2022-12-04 NOTE — PROGRESS NOTES
Bedside and Verbal shift change report given to [employer] (oncoming nurse) by Santos Monroe (offgoing nurse). Report included the following information SBAR, Kardex, Procedure Summary, MAR, Recent Results, and Cardiac Rhythm ST asymptomatic .

## 2022-12-05 ENCOUNTER — HOME HEALTH ADMISSION (OUTPATIENT)
Dept: HOME HEALTH SERVICES | Facility: HOME HEALTH | Age: 77
End: 2022-12-05
Payer: MEDICARE

## 2022-12-05 LAB
BACTERIA SPEC CULT: NORMAL
BACTERIA SPEC CULT: NORMAL
SERVICE CMNT-IMP: NORMAL
SERVICE CMNT-IMP: NORMAL

## 2022-12-07 ENCOUNTER — HOME CARE VISIT (OUTPATIENT)
Dept: SCHEDULING | Facility: HOME HEALTH | Age: 77
End: 2022-12-07
Payer: MEDICARE

## 2022-12-07 PROCEDURE — G0299 HHS/HOSPICE OF RN EA 15 MIN: HCPCS

## 2022-12-07 PROCEDURE — 400018 HH-NO PAY CLAIM PROCEDURE

## 2022-12-07 NOTE — Clinical Note
Completed [facility] [address] ADM 12/07/22 for disease and medication management. SNV freq 1w5, 2 prn. Needs PT, OT eval/treat.

## 2022-12-08 ENCOUNTER — HOME CARE VISIT (OUTPATIENT)
Dept: SCHEDULING | Facility: HOME HEALTH | Age: 77
End: 2022-12-08
Payer: MEDICARE

## 2022-12-08 VITALS
OXYGEN SATURATION: 97 % | RESPIRATION RATE: 18 BRPM | TEMPERATURE: 97 F | HEART RATE: 68 BPM | DIASTOLIC BLOOD PRESSURE: 78 MMHG | SYSTOLIC BLOOD PRESSURE: 136 MMHG

## 2022-12-08 PROCEDURE — G0151 HHCP-SERV OF PT,EA 15 MIN: HCPCS

## 2022-12-09 VITALS
RESPIRATION RATE: 15 BRPM | TEMPERATURE: 97.5 F | DIASTOLIC BLOOD PRESSURE: 63 MMHG | OXYGEN SATURATION: 99 % | HEART RATE: 90 BPM | SYSTOLIC BLOOD PRESSURE: 137 MMHG

## 2022-12-09 NOTE — HOME HEALTH
S: Patient stated she thought the pain in her groin was related to her UTI. Her  reports she has had trouble walking for a while before this hospitalization and he feels like it is weakness from being [address]. O: PAIN: see pain tab, R groin pain consistant with DJD of R hip   WOUND:no open wounds noted or reported. ROM: no impairments noted   STRENGTH:Patient demonstrates decreased muscle strength as follows:  R hip flex 3/5  R hip abd 3/5  R hip add 3/5  R hip ext nt/5  R knee flex 3/5  R knee ext 3/5  R ankle DF 3/5  L hip flex 3/5  L hip abd 3/5  L hip ext nt/5  L hip add 3/5  L knee flex 3/5  L knee ext 3/5  L ankle DF 3/5  FTSTS 58 seconds    EQUIPMENT: FWW, SPC, 4WW, raised commode  TRANSFERS: patient requires SBA from recliner and uses BUE to push up and significant support of back of legs on recliner for support and balance upon standing, has a wide base of support and requires AD for initial standing balance. GAIT: patient ambulating 30 ft with SBA using a FWW. Patient demonstrates the following gait deficits: antalgic gait pattern with decreased stance time RLE. Patient requires VC's for use of her walker at all times to improve safety and decrease risk of falling. STEPS: there are/is 2 to enter home. Steps not assessed this visit   BALANCE: Tinetti 15/28 and TUG 70 seconds - high fall risk due to reduced strength, gait deviations and decreased efficiency of balance reactions and pain. Patient demonstrates poor use and activation of ankle and hip strategy during standing balance testing and activities. A:ASSESSMENT AND PROGRESS TOWARD GOALS:  Patient demonstrated a positive result to therapy this date as evidenced by an improvement in gait pattern with cues, an understanding of her fall risk and agreement to not walk without her AD,  and patient expressing an understanding of the rehab plan due to therapy verbal and written instructions.  Goals established for increased independence in the home, safe mobility in the home, improvement in strength and balance all designed to reduce fall risk and progress toward independence. Patient will benefit from continued PT intervention to progress toward meeting all established goals     P:1W1, 2W2 (patient goes out of town 12/26 to visit family in [place])          PMH/Summary of clinical condition: Gisella Trivedi is a 68 y.o. female referred s/p hospitalization with a dx of flu A, dehydration, UTI and colitis. PMH: hypomagnesium, elevated troponin, hypotension, HTN and DM2. Medications reconciled. No changes in medications at this time. Medications are effective at this time. Social history/ caregivers: lives with her  in a one level home with 2 steps to enter. She requires assistance from her  for ADL's, IADL's, medication management and transportation. Pt/Caregiver instructed on plan of care and are agreeable to plan of care at this time. Plan of care and admission to home health status reported to attending physician:Alli Morales MD   Discharge planning discussed with patient and caregiver. Discharge planning as follows: DC to self and family under MD supervision when all goals are met or maximum potential is reached  Pt/Caregiver did verbalize understanding. Patient education provided this visit: safety, fall risk, HEP, need for assistance at all times with transfers and ambulation  Home exercise program: 1. always have someone with her for assistance when transferring or ambulating   2. stand and walk short distances every hour during the day to increase strength, provide pressure relief and increase independence with transfers  3. Patient/CG instructed in a  HEP as follows: LAQ, seated marching and chair push-ups x 10 reps 2-3 times daily. Patient's response to treatment: Patient reported increased R hip pain with walking and weightbearing.   Patient's response to education provided: Patient expressed understanding of her fall risk and to use her walker at all times. Continued need for the following skills: MD referral for HHPT following recent hospital stay. HHPT is medically necessary to address  dx and clinical findings: decreased strength, impaired gait, decreased ability w stair negotiation, increased swelling, decreased transfer status, decreased endurance, decreased balance and decreased safety, increased pain in order to improve functional mobility/quality of life, decrease burden of care, reduce risk for re-hospitalization, work towards patient's personal goals of return to PLOF w decrease risk for falls.

## 2022-12-09 NOTE — HOME HEALTH
Summary of clinical health condition:  68 y.o. female with history of hypertension, diabetes, presented to ER due to nausea vomiting and the diarrhea with decreased appetite for 2 or 3 days. She reported that her diarrhea mainly yesterday. It was watery diarrhea low blood. She presented hypotension and tachycardia. Sepsis protocol started. Her hypot ension resolved upper 2 L normal saline. She still presented with tachycardia, her magnesium was 1.5. Creatinine is 3.1. She recent visit for diabetes at [place]. VQ scan was done in ER which is negative for PE, PVL was negative for DVT. Flu  A is positive. She also have mild elevated troponin. She denies any chest pain     Denies any slurred speech/headache/cp/n/v/blurred vision/palpitation/gait change/bleeding. Denies smoking/ any alcohol or drug use. \"      She was admitted to the hospitalist service. She was given tamiflu, antibiotics and IV fluids. Her clinical status improved and her renal function improved. Her Na and K were normal at time of DC. Her cr has not normalized but is continuing to trend downwards and she is stable for DC home w out pt follow up. Red flags regarding when to seek emergent care was discussed. Medications reconciled, all medications are at home. The following education was provided regarding medications, medication interactions, and look a like medications: N/A all meds reviewed. Discussed importance of compliance, timely taking all prescribed meds, proper dosage and freq. Teaching provided with high risk medication Novolog S/E; weight gain, swelling to hands and feet, body allergic, pits at the injection site. Reviewed Januvia S/E;  upper respiratory infection, stuffy nose, sore throat & head ache. Medications are somewhat effective at this time.    Caregiver: caregiver/ is available to assist with daily meals, ADL's prn, provide reminders with daily medications, run errands, groceries and accompany to  MD appt. prn. Skilled care provided: Teaching disease and medication, completed assessment. Completed [facility] admission, explained POC, SNV freq and D/C plan to pt/CG with good understanding. Patient education provided this visit to include: Discussed intervention to prevent infection; hand washing, wearing mask when going to clinic or MD appt and avoiding sick person. Pacing activity/energy conservation; rest in bet activity and deep breathing exercises. Ambulate q 2hrs as tolerated, safety and fall prec; clearing walkway,  placing walker in easy access, avoid getting up too quickly when feeling weak, dizzy, and to call for assistance prn. Continue ADA and heart healthy diet. Avoid skipping meals and good hydration. Monitor for S/S of infection; fever 100.4, increase pain, redness, swelling, coughing with yellow thick sputum, cloudy urine with foul smell or strong odor, not feeling well 2-3 days, SOB, and to call [facility] or MD for assistance if experiencing any of these S/S. To call 911 with chest pains, facial drooping, difficulty talking, non arousable/unconscious and uncontrollable bleeding. Sharps education provided: Reviewed teaching how to dispose sharp needles, may obtain sharps containers from medical supplies or pharmacies or may use laundry detergent or liquid softener container, close lid with a tight lid, needles should not be able to poke through puncture the lid. Close lid tight and tape when container gets 2/3 full and label \"SHARP Needles Do Not Recycle\"   Patient/caregiver degree of understanding: Pt has  good understanding of the teaching provided during visit. Home health supplies by type and quantity ordered/delivered this visit include: N/A    Pt./CG instructed on plan of care, PT, OT eval/treat, SN freq visit; 1w5, 2 prn and D/C plan. Home exercise program/Homework provided: Ambulation, HEP deep breathing exercises 10x when having SOB, pain and anxiety, IS 10x q 1-2 hrs.       Plan of care and admission to home health status called to attending physician: Dr. Jacqueline Kapoor MD was informed admission completed 12/07/22     Discharge planning discussed with patient and caregiver. Discharge planning as follows: Pt/CG will be able to manage disease and medication independently and health condition stable. Pt/Caregiver did verbalize understanding of discharge planning. Patient/caregiver encouraged/instructed to keep appointment as lack of follow through with physician appointment could result in discontinuation of home care services for non-compliance. The [address] [facility] [address] of Rights was verbally reviewed and signed by pt on this visit. The patient or representative was given the opportunity to ask pertinent questions regarding the bill of rights. COVID - 23 Screening completed before visit:       Denies and no family member  has any of these S/S:    Fever, dry cough, sore throat diarrhea, body aches, chills, not feeling well and loss of taste.

## 2022-12-09 NOTE — CASE COMMUNICATION
Carlin Rivera is a 68 y.o. female referred s/p hospitalization with a dx of flu A, dehydration, UTI and colitis. PMH: hypomagnesium, elevated troponin, hypotension, HTN and DM2. Plan: 3V8, 2W2.    Therapy Functional Score Assessment  Question   Score   Grooming  2       Upper Dressing 2      Lower Dressing 2      Bathing  4      Toilet Transfer  1    Transfer  2            Ambulation  3   Dyspnea                      0       Pain Interfering with activity 3  Est number therapy visits      5

## 2022-12-10 ENCOUNTER — HOME CARE VISIT (OUTPATIENT)
Dept: HOME HEALTH SERVICES | Facility: HOME HEALTH | Age: 77
End: 2022-12-10
Payer: MEDICARE

## 2022-12-13 ENCOUNTER — HOME CARE VISIT (OUTPATIENT)
Dept: HOME HEALTH SERVICES | Facility: HOME HEALTH | Age: 77
End: 2022-12-13
Payer: MEDICARE

## 2022-12-14 ENCOUNTER — HOME CARE VISIT (OUTPATIENT)
Dept: SCHEDULING | Facility: HOME HEALTH | Age: 77
End: 2022-12-14
Payer: MEDICARE

## 2022-12-14 VITALS
HEART RATE: 98 BPM | OXYGEN SATURATION: 99 % | TEMPERATURE: 98.5 F | SYSTOLIC BLOOD PRESSURE: 120 MMHG | RESPIRATION RATE: 18 BRPM | DIASTOLIC BLOOD PRESSURE: 75 MMHG

## 2022-12-14 PROCEDURE — G0152 HHCP-SERV OF OT,EA 15 MIN: HCPCS

## 2022-12-14 PROCEDURE — G0299 HHS/HOSPICE OF RN EA 15 MIN: HCPCS

## 2022-12-14 NOTE — HOME HEALTH
Clinical Condition per [facility]:    Hospital Course:     \"Reason for admission ( Admitting HPI): \" Laly Arias is a 68 y.o. female with history of hypertension, diabetes, presented to ER due to nausea vomiting and the diarrhea with decreased appetite for 2 or 3 days. She reported that her diarrhea mainly yesterday. It was watery diarrhea low blood. She presented hypotension and tachycardia. Sepsis protocol started. Her hypotension resolved upper 2 L normal saline. She still presented with tachycardia, her magnesium was 1.5. Creatinine is 3.1. She recent visit for diabetes at [place]. VQ scan was done in ER which is negative for PE, PVL was negative for DVT. Flu  A is positive. She also have mild elevated troponin. She denies any chest pain. Denies any slurred speech/headache/cp/n/v/blurred vision/palpitation/gait change/bleeding. Denies smoking/ any alcohol or drug use. \"     \"She was admitted to the hospitalist service. She was given tamiflu, antibiotics and IV fluids. Her clinical status improved and her renal function improved. Her Na and K were normal at time of DC. Her cr has not normalized but is continuing to trend downwards and she is stable for DC home w out pt follow up. Red flags regarding when to seek emergent care was discussed\". Past Medical History: \" Arthritis \"all over\", Diabetes ([address] , Type II 20 yrs ago, Hypertension, Ill-defined condition, Retinitis pigmentosa\". Past Surgical History: \"HX COLONOSCOPY, HX HEENT cataracts 8-9 yrs ago, [address], HX ORTHOPAEDIC 2010 right shoulder, HX ORTHOPAEDIC 2009, foot surgery d/t arthritis, HX WISDOM TEETH EXTRACTION many yrs ago, LA BREAST SURGERY PROCEDURE UNLISTED 1984, calcium deposit taken out right breast\". SUBJECTIVE: Pt reports right hip pain and states \"my doctor diagnosis me with osteoarthritis\". Pt agreeable to home health occupational therapy assessment.      CAREGIVER INVOLVEMENT: Pt's  assist with IADL's as needed. MEDICATION RECONCILIATION: Medication reconciled    DME ORDERED/RECOMMENDED: N/A    PLOF: Pt lives with  in one story with 2 stairs, however, stairs lack hand railings. Pt was modified independent with ADL's, functional mobility and transfers prior to hospitalization. Pt used an assistive device (rolling walker) for engagement in functional mobility. Pt is able to complete IADL's, however,  provides assistance as needed. OBJECTIVE:    BATHING: Pt has a walk-in shower with a built-in shower seat, with grab bars and handheld shower head. Pt completes upper/lower body bathing in sitting with modified independence. Pt reports difficulty with washing bilateral feet throughly. Pt educated on use of adaptive equipment (long handled sponge) with pt verbalizing understanding. TOILETING: Pt has a riased toilet seat with bilateral handles. Pt completes all aspect of toileting to include transfer to/from, hygiene and clothing management with modified independence. UB DRESSING: Pt completes upper body dressing to include dayan/doff shirt, bra and/or jackets with modified independence. LB DRESSING: Pt completes lower body dressing to include socks, shoes, and pants with modified independence. GROOMING: Pt completes simple grooming to include face/hand washing, hair grooming and oral care with modified independence. FEEDING: Pt completes self feeding with independence. OT instructed/demonstrated pt the following with good understanding:     -         energy conservation while performing bathing, dressing, and setup such as set clothing out night before, gather items required to perform task in one trip, sit while performing tasks, take rest breaks as needed, perform shower/bathing on days with no other appointments or activities scheduled, etc.     -           pt is to perform bathing/dressing tasks sitting, when possible, for safety/fall prevention.      - while sitting perform weight shift technique bringing LE contra laterally onto opposite LE while performing LB bathing/dressing for safety and fall prevention. -           pt instructed/demonstrated one handed technique while standing to perform functional tasks with use rollator walker/grab bar for increased stability, fall prevention, and safety while standing.    -          ADL training performed for improved body mechanics, safety, and technique for reduced risk of falls and improved functional level and participation of tasks. IADL: Pt's  assist with IADL's as needed    BALANCE:    STATIC STANDING/SITTING: Refer to OT assessment     DYNAMIC STANDING/SITTING: Refer to OT assessment     AMBULATION: Pt is able to complete short distance functional mobility with use of rolling walker to/from living room to bedroom/bathroom. Pt presents with decreased safety awareness with rolling walker use, noted lifting walker off ground to avoid obstacles in path. Pt demonstrates an \"antalgic gait\" pattern, requiring increase time for navigating throughout the home. Verbal cues provided for walker safety. EOB/BED TRANSFER: Pt is able to complete bed mobility, supine to/from sit with modified independence. COUCH: Pt presents with widen BALBINA when initiating sit/stand from recliner to assistive device. Pt noted with use of BUE to assist with pushing up, however, utilizing support of BLE to brace against surface to transition to erect standing posture. TOILET: Due to width of bathroom door and clutter, pt noted furniture walking from doorway to toilet seat. Pt demonstrated proper technique and hand placement for completing toilet transfer. TUB SHOWER: Pt is able to transfer in/out of shower, however, demonstrates decreased safety awareness and technique. Pt noted utilizing glass door handles to enter to/from bathroom and non weight bearing object to assist with transferring out of shower.      PATIENT REPONSE TO TREATMENT: Pt responded well to home health occupational therapy assessment. PATIENT EDUCATION PROVIDED THIS VISIT: OT role, energy conservation, adaptive equipment, fall prevention/safety during I/ADL, functional mobility and transfers, continue with current diet and medication as instructed per MD, consult MD or urgent care oppossed to ER unless situation emergent. PATIENT LEVEL OF UNDERSTANDING OF EDUCATION PROVIDED: Pt able to teach back role of OT with good understanding. Pt educated on use of adaptive equipment (reacher, sock aid, long handled sponge, etc) to improve efficiency and safety with lower body dressing, with pt verbalizing understanding, however, decline training on use. Pt educated on energy conservation techniques to implement as needed, however, pt verbalizing she is currently implementing strategies throughout her daily routines (e.g. taking rest breaks). REHAB POTENTIAL: Mrs. Carlos Brown presents at her baseline of functional independence. Pt is able to complete her daily ADL routine and complete household functional mobility with support of an assistive device. Pt is able to complete IADL tasks, however, pt's  assist as needed. Pt would benefit from education and training on adaptive equipment use of efficiency with lower body ADL's, however, pt declined continuing skilled OT services at this time. Pt with no further skilled home health occupational therapy needs at this time, and patient in agreement. ASSESSMENT: Pt demonstrates independence with all ADL's, functional mobility and transfers. Pt is able to complete IADL's, however, pt's  assist as needed. Pt reports she is at her baseline level prior to hospitalization with no further question/concerns. Pt presents with 4+/5 BUE strength, this is noted during MMT, which indicated pt's good BUE strength/endurance for engagement in functional daily routines.  Pt presents with balance deficits and decreased safety awareness. Due to clutter in home and fair - balance, pt is at a high risk of falls within the home. Pt expressed preference to address balance deficits with physical therapy. Pt is able to complete lower body bathing/dressing, however, educated on training and use of adaptive equipment to improve efficiency and safety, with verbalizing understanding, however, declined further training. SKILLED CARE PROVIDED: Pt completed full occupational therapy assessment to include balance, coordination, strengthening, ADL education/training, functional mobility and home safety. PLAN:  Pt frequency 1w1. Pt will discharge to skilled nursing and home health physical therapy.

## 2022-12-14 NOTE — CASE COMMUNICATION
REHAB POTENTIAL: Mrs. Michelle Gold presents at her baseline of functional independence. Pt is able to complete her daily ADL routine and complete household functional mobility with support of an assistive device. Pt is able to complete IADL tasks, however, pt's  assist as needed. Pt would benefit from education and training on adaptive equipment use of efficiency with lower body ADL's, however, pt declined continuing skilled OT service s at this time. Pt with no further skilled home health occupational therapy needs at this time, and patient in agreement. PLAN:  Pt frequency 1w1. Pt will discharge to skilled nursing and home health physical therapy.

## 2022-12-15 ENCOUNTER — HOME CARE VISIT (OUTPATIENT)
Dept: SCHEDULING | Facility: HOME HEALTH | Age: 77
End: 2022-12-15
Payer: MEDICARE

## 2022-12-15 VITALS
HEART RATE: 88 BPM | OXYGEN SATURATION: 98 % | TEMPERATURE: 98.8 F | RESPIRATION RATE: 16 BRPM | DIASTOLIC BLOOD PRESSURE: 72 MMHG | SYSTOLIC BLOOD PRESSURE: 120 MMHG

## 2022-12-15 VITALS
TEMPERATURE: 98.9 F | RESPIRATION RATE: 16 BRPM | SYSTOLIC BLOOD PRESSURE: 138 MMHG | OXYGEN SATURATION: 98 % | HEART RATE: 97 BPM | DIASTOLIC BLOOD PRESSURE: 68 MMHG

## 2022-12-15 PROCEDURE — G0157 HHC PT ASSISTANT EA 15: HCPCS

## 2022-12-15 NOTE — HOME HEALTH
Skilled reason for visit: education regarding diet, medications and disease     Caregiver involvement:  cares for all needs and is available 24/7. Medications reviewed and all medications are available in the home this visit. The following education was provided regarding medications:  norvasc and its sideeffects and what it is for . MD notified of any discrepancies/look a-like medications/medication interactions: none  Medications are reconciled  at this time.       Home health supplies by type and quantity ordered/delivered this visit include: none    Patient education provided this visit:  educated on protein shakes and different types, ways to help with diaherra ,     Sharps education provided: NA    Patient level of understanding of education provided:  verbalized understanding     Skilled Care Performed this visit: noted above     Patient response to procedure performed:  NA    Patient's Progress towards personal goals: patient stated that MD is getting xray's of her hip and she hopes that she is able to get it fixed     Home exercise program: deep breathing     Continued need for the following skills: Nursing and Physical Therapy    Plan for next visit: education    Patient and/or caregiver notified and agrees to changes in the Plan of Care YES/NO/NA: N/A      The following discharge planning was discussed with the pt/caregiver: when goals met and education is complete

## 2022-12-15 NOTE — HOME HEALTH
SUBJECTIVE: Patient reports that she is having some aches and pains from the weather but otherwise doing ok. CAREGIVER INVOLVEMENT/ASSISTANCE NEEDED FOR:  present and assists with ADLs and IADLs needed  . OBJECTIVE:  See interventions. PATIENT EDUCATION PROVIDED THIS VISIT: Pain management education, edema management, gait training, transfer training, HEP program.   PATIENT RESPONSE TO EDUCATION PROVIDED: Patient verbalized understanding and return demonstration of techniques. PATIENT RESPONSE TO TREATMENT: Patient reports a 5/10 pain levels, patient reports 3/10 on AYDEN RPE following ambulation, patient reports 3-4/10 on AYDEN RPE scale following exercises. .  ASSESSMENT OF PROGRESS TOWARD GOALS: Patient is making progress toward goals indicated by improvements in gait quality with SPC, improved transfer assistance, advanced HEP program. Patient is limited secondary to impaired balance, decreased strength and endurance. Patient would benefit from continued skilled PT to improve strength, independent mobility, stair negotiation, car transfers. Kathrin Anderson PLAN FOR NEXT VISIT: outdoor ambulation, car transfers  [address] PATIENT/CAREGIVER:  2w1 with discharge from skilled PT and no further rehab needs identified. Patient and caregiver verbalized understanding and in agreement.

## 2022-12-20 ENCOUNTER — HOME CARE VISIT (OUTPATIENT)
Dept: SCHEDULING | Facility: HOME HEALTH | Age: 77
End: 2022-12-20
Payer: MEDICARE

## 2022-12-20 VITALS
TEMPERATURE: 98.7 F | HEART RATE: 75 BPM | RESPIRATION RATE: 17 BRPM | DIASTOLIC BLOOD PRESSURE: 64 MMHG | OXYGEN SATURATION: 98 % | SYSTOLIC BLOOD PRESSURE: 130 MMHG

## 2022-12-20 PROCEDURE — G0157 HHC PT ASSISTANT EA 15: HCPCS

## 2022-12-20 NOTE — Clinical Note
Patient is doing well. Improved in all functional tests and improved mobility. patient is going out of town through - next week. Below is her summary of care:    Assessment and Summary of Care:  Patient's current functional status before discharge is as follows  Strength: R hip flex 3+/5 R hip abd 4-/5 R hip add 4-/5 R hip ext nt/5 R knee flex 4-/5 R knee ext3+/5 R ankle DF 3/5 L hip flex 4/5 L hip abd 4/5 L hip ext nt/5 L hip add 4-/5 L knee flex4- /5 L knee ext 4-/5 L ankle DF 4/5  ROM:  WNL per evaluation  Bed Mobility: Independent in bed mobility  Transfers: Mod I with all functional transfers to include bed, chair, toilet and car. Gait/WC mobility: Patient ambulates > feet with FWW/SPC and mod I over even/uneven terrain. Stairs: 2 steps ascending/descending with 1 HR support and SPC and demonstrates mod I with technique. Special Tests: Tinetti: indicating a medium risk for falls TU seconds FTSTS: 32 seconds  Recommendations: No further rehab needs identified at this time. Patient to continue HEP and walking program. patient in agreement with discharge plan. Let me know if you have further questions.     Thanks,  JEANNE Adams

## 2022-12-20 NOTE — HOME HEALTH
SUBJECTIVE: patient reportst that she may have over did it a bit. CAREGIVER INVOLVEMENT/ASSISTANCE NEEDED FOR:  present and assists with ADLs and IADLs needed    OBJECTIVE: See interventions. PATIENT EDUCATION PROVIDED THIS VISIT: Pain management education, stair negotiation strategies, car transfer trainings. PATIENT RESPONSE TO EDUCATION PROVIDED: Patient verbalized understanding and return demonstration of techniques. PATIENT RESPONSE TO TREATMENT: Patient reports a 7/10 pain levels. Patient demonstrates independent understanding on use of SPC for stair negotiation. Assessment and Summary of Care:  Patient's current functional status before discharge is as follows  Strength: R hip flex 3+/5 R hip abd 4-/5 R hip add 4-/5 R hip ext nt/5 R knee flex 4-/5 R knee ext3+/5 R ankle DF 3/5 L hip flex 4/5 L hip abd 4/5 L hip ext nt/5 L hip add 4-/5 L knee flex4- /5 L knee ext 4-/5 L ankle DF 4/5  ROM:  WNL per evaluation  Bed Mobility: Independent in bed mobility  Transfers: Mod I with all functional transfers to include bed, chair, toilet and car. Gait/WC mobility: Patient ambulates > feet with FWW/SPC and mod I over even/uneven terrain. Stairs: 2 steps ascending/descending with 1 HR support and SPC and demonstrates mod I with technique. Special Tests: Tinetti:22/28 indicating a medium risk for falls TU seconds FTSTS: 32 seconds  Recommendations: No further rehab needs identified at this time. Patient to continue HEP and walking program. patient in agreement with discharge plan.

## 2022-12-22 ENCOUNTER — HOME CARE VISIT (OUTPATIENT)
Dept: SCHEDULING | Facility: HOME HEALTH | Age: 77
End: 2022-12-22
Payer: MEDICARE

## 2022-12-22 VITALS
TEMPERATURE: 97.7 F | SYSTOLIC BLOOD PRESSURE: 104 MMHG | OXYGEN SATURATION: 96 % | RESPIRATION RATE: 16 BRPM | HEART RATE: 81 BPM | DIASTOLIC BLOOD PRESSURE: 62 MMHG

## 2022-12-22 VITALS
TEMPERATURE: 98.9 F | DIASTOLIC BLOOD PRESSURE: 72 MMHG | OXYGEN SATURATION: 98 % | SYSTOLIC BLOOD PRESSURE: 110 MMHG | RESPIRATION RATE: 16 BRPM | HEART RATE: 78 BPM

## 2022-12-22 PROCEDURE — G0151 HHCP-SERV OF PT,EA 15 MIN: HCPCS

## 2022-12-22 PROCEDURE — G0299 HHS/HOSPICE OF RN EA 15 MIN: HCPCS

## 2022-12-22 NOTE — HOME HEALTH
Skilled reason for visit: disease education as well as medications education. Patient is starting outpatient therapy for right hip    Caregiver involvement:  cares for all needs and is available 24/7. Medications reviewed and all medications are available in the home this visit. The following education was provided regarding medications:  all medications are in home and she takes them as ordered . MD notified of any discrepancies/look a-like medications/medication interactions: none  Medications are reconciled  at this time.       Home health supplies by type and quantity ordered/delivered this visit include: none    Patient education provided this visit: SN educated on s/s to report to MD, deep breathing , using ice on hip     Sharps education provided: NA    Patient level of understanding of education provided: patient verbalized understanding     Skilled Care Performed this visit: noted above     Patient response to procedure performed:  none    Agency Progress toward goals: all goals met at this time     Patient's Progress towards personal goals: patient stated that she feel better     Home exercise program: deep breathing     Continued need for the following skills:none    Plan for next visit: NA    Patient and/or caregiver notified and agrees to changes in the Plan of Care YES/NO/NA: N/A      The following discharge planning was discussed with the pt/caregiver: today from agency per MD  request to start outpatient

## 2022-12-23 NOTE — CASE COMMUNICATION
Karley Salgado received skilled PT, OT and RN s/p hospitalization with flu A. This patient has currently met all goals and has been discharged to a [facility] and outpatient physical therapy at this time. The following goals have been met: TUG 40 seconds, FTSTS 32 seconds, Tinetti 22/28 and pt is ambulating MI with a SPC or FWW. She continues to have pain and difficulty walking due to R hip pain with recent x-rays that showed OA and outpatient  PT was ordered to address hip pain and weakness. Patient verbalized understanding of all discharge instructions and is in agreement with discharge this visit.

## 2022-12-23 NOTE — HOME HEALTH
S: Patient stated she was doing a lot better and that Darnelle Escort has really helped her. O: PAIN: see pain tab   WOUND:no open wounds noted or reported. ROM: Active hip flexion limited due to pain   STRENGTH:Patient demonstrates decreased muscle strength as follows:  R hip flex 3-/5  R hip abd 3-/5  R hip add 3/5  R hip ext nt/5  R knee flex 3/5  R knee ext 3-/5  R ankle DF 4/5  L hip flex 4/5  L hip abd 4/5  L hip ext 4/5  L hip add 4/5  L knee flex 4/5  L knee ext 4/5  L ankle DF 4/5  FTSTS goal met   BED MOBILITY: independent   EQUIPMENT: FWW, SPC  TRANSFERS: MI  GAIT: MI with antlagic gait pattern with SPC and FWW  STEPS: SBA   BALANCE: Tinetti 22/28 and TUG 40 seconds - pt has been free from falls    A:ASSESSMENT AND PROGRESS TOWARD GOALS:  Leonardo Harris received skilled PT, OT and RN s/p hospitalization with flu A. This patient has currently met all goals and has been discharged to a [facility] and outpatient physical therapy at this time. The following goals have been met: TUG 40 seconds, FTSTS 32 seconds, Tinetti 22/28 and pt is ambulating MI with a SPC or FWW. She continues to have pain and difficulty walking due to R hip pain with recent x-rays that showed OA and outpatient PT was ordered to address hip pain and weakness. Patient verbalized understanding of all discharge instructions and is in agreement with discharge this visit. P: DC    1. Discharge medication list reconciled with patient and caregiver. Questions regarding medications answered and patient/caregiver advised to refer to MD for any medication questions after discharge. 2. Patient to continue use of the following assistive device for maximum safety: FWW  3. Today's treatment included: review of therapeutic exercise program, reassessment of mobility, transfers, balance and gait. 4. Patient and caregiver demonstrate understanding of DC instructions and repeat verbalization. Patient and caregiver given written copy of instructions.   5. Patient and caregiver given notification of discharge and in agreement with DC this date. 6. MD notified of discharge.

## 2022-12-29 PROBLEM — N39.0 UTI (URINARY TRACT INFECTION): Status: RESOLVED | Noted: 2022-11-29 | Resolved: 2022-12-29

## 2022-12-29 PROBLEM — E86.0 DEHYDRATION: Status: RESOLVED | Noted: 2021-11-19 | Resolved: 2022-12-29

## 2022-12-29 PROBLEM — R77.8 ELEVATED TROPONIN: Status: RESOLVED | Noted: 2022-11-29 | Resolved: 2022-12-29

## 2023-04-04 ENCOUNTER — HOSPITAL ENCOUNTER (OUTPATIENT)
Facility: HOSPITAL | Age: 78
Discharge: HOME | End: 2023-04-07
Payer: MEDICARE

## 2023-04-04 DIAGNOSIS — N39.41 URGE INCONTINENCE: ICD-10-CM

## 2023-04-04 LAB
EKG ATRIAL RATE: 80 BPM
EKG DIAGNOSIS: NORMAL
EKG P AXIS: 59 DEGREES
EKG P-R INTERVAL: 208 MS
EKG Q-T INTERVAL: 386 MS
EKG QRS DURATION: 80 MS
EKG QTC CALCULATION (BAZETT): 445 MS
EKG R AXIS: 38 DEGREES
EKG T AXIS: 61 DEGREES
EKG VENTRICULAR RATE: 80 BPM
ERYTHROCYTE [DISTWIDTH] IN BLOOD BY AUTOMATED COUNT: 13.8 % (ref 11.6–14.5)
HCT VFR BLD AUTO: 37.5 % (ref 35–45)
HGB BLD-MCNC: 12 G/DL (ref 12–16)
MCH RBC QN AUTO: 28.4 PG (ref 24–34)
MCHC RBC AUTO-ENTMCNC: 32 G/DL (ref 31–37)
MCV RBC AUTO: 88.9 FL (ref 78–100)
NRBC # BLD: 0 K/UL (ref 0–0.01)
NRBC BLD-RTO: 0 PER 100 WBC
PLATELET # BLD AUTO: 348 K/UL (ref 135–420)
PMV BLD AUTO: 9.3 FL (ref 9.2–11.8)
RBC # BLD AUTO: 4.22 M/UL (ref 4.2–5.3)
WBC # BLD AUTO: 6.7 K/UL (ref 4.6–13.2)

## 2023-04-04 PROCEDURE — 36415 COLL VENOUS BLD VENIPUNCTURE: CPT

## 2023-04-04 PROCEDURE — 85027 COMPLETE CBC AUTOMATED: CPT

## 2023-04-04 PROCEDURE — 83036 HEMOGLOBIN GLYCOSYLATED A1C: CPT

## 2023-04-04 PROCEDURE — 93005 ELECTROCARDIOGRAM TRACING: CPT

## 2023-04-07 LAB
EST. AVERAGE GLUCOSE BLD GHB EST-MCNC: 192 MG/DL
HBA1C MFR BLD: 8.3 % (ref 4.2–5.6)

## 2023-04-14 ENCOUNTER — ANESTHESIA EVENT (OUTPATIENT)
Facility: HOSPITAL | Age: 78
End: 2023-04-14
Payer: MEDICARE

## 2023-04-17 RX ORDER — DEXTROSE MONOHYDRATE 100 MG/ML
INJECTION, SOLUTION INTRAVENOUS CONTINUOUS PRN
Status: CANCELLED | OUTPATIENT
Start: 2023-04-17

## 2023-04-18 ENCOUNTER — ANESTHESIA (OUTPATIENT)
Facility: HOSPITAL | Age: 78
End: 2023-04-18
Payer: MEDICARE

## 2023-04-18 ENCOUNTER — HOSPITAL ENCOUNTER (OUTPATIENT)
Facility: HOSPITAL | Age: 78
Setting detail: OUTPATIENT SURGERY
Discharge: HOME | End: 2023-04-18
Attending: UROLOGY | Admitting: UROLOGY
Payer: MEDICARE

## 2023-04-18 VITALS
SYSTOLIC BLOOD PRESSURE: 130 MMHG | HEIGHT: 65 IN | TEMPERATURE: 97.9 F | OXYGEN SATURATION: 100 % | HEART RATE: 90 BPM | DIASTOLIC BLOOD PRESSURE: 77 MMHG | WEIGHT: 197.4 LBS | BODY MASS INDEX: 32.89 KG/M2 | RESPIRATION RATE: 18 BRPM

## 2023-04-18 PROBLEM — N39.41 URGE INCONTINENCE: Status: ACTIVE | Noted: 2023-04-18

## 2023-04-18 LAB
GLUCOSE BLD STRIP.AUTO-MCNC: 101 MG/DL (ref 70–110)
GLUCOSE BLD STRIP.AUTO-MCNC: 144 MG/DL (ref 70–110)

## 2023-04-18 PROCEDURE — A4216 STERILE WATER/SALINE, 10 ML: HCPCS | Performed by: UROLOGY

## 2023-04-18 PROCEDURE — 7100000010 HC PHASE II RECOVERY - FIRST 15 MIN: Performed by: UROLOGY

## 2023-04-18 PROCEDURE — 2709999900 HC NON-CHARGEABLE SUPPLY: Performed by: UROLOGY

## 2023-04-18 PROCEDURE — 6360000002 HC RX W HCPCS: Performed by: UROLOGY

## 2023-04-18 PROCEDURE — 7100000011 HC PHASE II RECOVERY - ADDTL 15 MIN: Performed by: UROLOGY

## 2023-04-18 PROCEDURE — 3600000012 HC SURGERY LEVEL 2 ADDTL 15MIN: Performed by: UROLOGY

## 2023-04-18 PROCEDURE — 3600000002 HC SURGERY LEVEL 2 BASE: Performed by: UROLOGY

## 2023-04-18 PROCEDURE — 3700000001 HC ADD 15 MINUTES (ANESTHESIA): Performed by: UROLOGY

## 2023-04-18 PROCEDURE — 2580000003 HC RX 258: Performed by: UROLOGY

## 2023-04-18 PROCEDURE — 6360000002 HC RX W HCPCS: Performed by: SPECIALIST

## 2023-04-18 PROCEDURE — 82962 GLUCOSE BLOOD TEST: CPT

## 2023-04-18 PROCEDURE — 3700000000 HC ANESTHESIA ATTENDED CARE: Performed by: UROLOGY

## 2023-04-18 PROCEDURE — 7100000001 HC PACU RECOVERY - ADDTL 15 MIN: Performed by: UROLOGY

## 2023-04-18 PROCEDURE — 2500000003 HC RX 250 WO HCPCS: Performed by: SPECIALIST

## 2023-04-18 PROCEDURE — 7100000000 HC PACU RECOVERY - FIRST 15 MIN: Performed by: UROLOGY

## 2023-04-18 RX ORDER — IPRATROPIUM BROMIDE AND ALBUTEROL SULFATE 2.5; .5 MG/3ML; MG/3ML
1 SOLUTION RESPIRATORY (INHALATION)
Status: DISCONTINUED | OUTPATIENT
Start: 2023-04-18 | End: 2023-04-18 | Stop reason: HOSPADM

## 2023-04-18 RX ORDER — METHENAMINE, SODIUM PHOSPHATE, MONOBASIC, MONOHYDRATE, PHENYL SALICYLATE, METHYLENE BLUE, AND HYOSCYAMINE SULFATE 120; 40.8; 36; 10; .12 MG/1; MG/1; MG/1; MG/1; MG/1
1 CAPSULE ORAL EVERY 8 HOURS PRN
Qty: 12 CAPSULE | Refills: 0 | Status: SHIPPED | OUTPATIENT
Start: 2023-04-18

## 2023-04-18 RX ORDER — LABETALOL HYDROCHLORIDE 5 MG/ML
10 INJECTION, SOLUTION INTRAVENOUS
Status: DISCONTINUED | OUTPATIENT
Start: 2023-04-18 | End: 2023-04-18 | Stop reason: HOSPADM

## 2023-04-18 RX ORDER — LEVOFLOXACIN 500 MG/1
500 TABLET, FILM COATED ORAL DAILY
Qty: 5 TABLET | Refills: 0 | Status: SHIPPED | OUTPATIENT
Start: 2023-04-18 | End: 2023-04-23

## 2023-04-18 RX ORDER — SODIUM CHLORIDE 0.9 % (FLUSH) 0.9 %
5-40 SYRINGE (ML) INJECTION EVERY 12 HOURS SCHEDULED
Status: DISCONTINUED | OUTPATIENT
Start: 2023-04-18 | End: 2023-04-18 | Stop reason: HOSPADM

## 2023-04-18 RX ORDER — HYDROMORPHONE HYDROCHLORIDE 1 MG/ML
0.5 INJECTION, SOLUTION INTRAMUSCULAR; INTRAVENOUS; SUBCUTANEOUS EVERY 5 MIN PRN
Status: DISCONTINUED | OUTPATIENT
Start: 2023-04-18 | End: 2023-04-18 | Stop reason: HOSPADM

## 2023-04-18 RX ORDER — FENTANYL CITRATE 50 UG/ML
INJECTION, SOLUTION INTRAMUSCULAR; INTRAVENOUS PRN
Status: DISCONTINUED | OUTPATIENT
Start: 2023-04-18 | End: 2023-04-18 | Stop reason: SDUPTHER

## 2023-04-18 RX ORDER — DIPHENHYDRAMINE HYDROCHLORIDE 50 MG/ML
12.5 INJECTION INTRAMUSCULAR; INTRAVENOUS
Status: DISCONTINUED | OUTPATIENT
Start: 2023-04-18 | End: 2023-04-18 | Stop reason: HOSPADM

## 2023-04-18 RX ORDER — SODIUM CHLORIDE, SODIUM LACTATE, POTASSIUM CHLORIDE, CALCIUM CHLORIDE 600; 310; 30; 20 MG/100ML; MG/100ML; MG/100ML; MG/100ML
INJECTION, SOLUTION INTRAVENOUS CONTINUOUS
Status: DISCONTINUED | OUTPATIENT
Start: 2023-04-18 | End: 2023-04-18 | Stop reason: HOSPADM

## 2023-04-18 RX ORDER — KETOROLAC TROMETHAMINE 30 MG/ML
30 INJECTION, SOLUTION INTRAMUSCULAR; INTRAVENOUS EVERY 6 HOURS PRN
Status: DISCONTINUED | OUTPATIENT
Start: 2023-04-18 | End: 2023-04-18 | Stop reason: CLARIF

## 2023-04-18 RX ORDER — CEFAZOLIN SODIUM 1 G/3ML
INJECTION, POWDER, FOR SOLUTION INTRAMUSCULAR; INTRAVENOUS PRN
Status: DISCONTINUED | OUTPATIENT
Start: 2023-04-18 | End: 2023-04-18 | Stop reason: SDUPTHER

## 2023-04-18 RX ORDER — MEPERIDINE HYDROCHLORIDE 50 MG/ML
12.5 INJECTION INTRAMUSCULAR; INTRAVENOUS; SUBCUTANEOUS ONCE
Status: DISCONTINUED | OUTPATIENT
Start: 2023-04-18 | End: 2023-04-18 | Stop reason: HOSPADM

## 2023-04-18 RX ORDER — FENTANYL CITRATE 50 UG/ML
25 INJECTION, SOLUTION INTRAMUSCULAR; INTRAVENOUS EVERY 5 MIN PRN
Status: DISCONTINUED | OUTPATIENT
Start: 2023-04-18 | End: 2023-04-18 | Stop reason: HOSPADM

## 2023-04-18 RX ORDER — OXYCODONE HYDROCHLORIDE 5 MG/1
5 TABLET ORAL
Status: DISCONTINUED | OUTPATIENT
Start: 2023-04-18 | End: 2023-04-18 | Stop reason: HOSPADM

## 2023-04-18 RX ORDER — SODIUM CHLORIDE 0.9 % (FLUSH) 0.9 %
5-40 SYRINGE (ML) INJECTION PRN
Status: DISCONTINUED | OUTPATIENT
Start: 2023-04-18 | End: 2023-04-18 | Stop reason: HOSPADM

## 2023-04-18 RX ORDER — PROPOFOL 10 MG/ML
INJECTION, EMULSION INTRAVENOUS PRN
Status: DISCONTINUED | OUTPATIENT
Start: 2023-04-18 | End: 2023-04-18 | Stop reason: SDUPTHER

## 2023-04-18 RX ORDER — DROPERIDOL 2.5 MG/ML
0.62 INJECTION, SOLUTION INTRAMUSCULAR; INTRAVENOUS
Status: DISCONTINUED | OUTPATIENT
Start: 2023-04-18 | End: 2023-04-18 | Stop reason: HOSPADM

## 2023-04-18 RX ORDER — LIDOCAINE HYDROCHLORIDE 20 MG/ML
INJECTION, SOLUTION EPIDURAL; INFILTRATION; INTRACAUDAL; PERINEURAL PRN
Status: DISCONTINUED | OUTPATIENT
Start: 2023-04-18 | End: 2023-04-18 | Stop reason: SDUPTHER

## 2023-04-18 RX ORDER — ACETAMINOPHEN 500 MG
500 TABLET ORAL EVERY 6 HOURS PRN
COMMUNITY

## 2023-04-18 RX ORDER — ONDANSETRON 2 MG/ML
4 INJECTION INTRAMUSCULAR; INTRAVENOUS
Status: DISCONTINUED | OUTPATIENT
Start: 2023-04-18 | End: 2023-04-18 | Stop reason: HOSPADM

## 2023-04-18 RX ORDER — INSULIN LISPRO 100 [IU]/ML
0-8 INJECTION, SOLUTION INTRAVENOUS; SUBCUTANEOUS EVERY 4 HOURS
Status: DISCONTINUED | OUTPATIENT
Start: 2023-04-18 | End: 2023-04-18 | Stop reason: HOSPADM

## 2023-04-18 RX ORDER — SODIUM CHLORIDE 9 MG/ML
INJECTION, SOLUTION INTRAVENOUS PRN
Status: DISCONTINUED | OUTPATIENT
Start: 2023-04-18 | End: 2023-04-18 | Stop reason: HOSPADM

## 2023-04-18 RX ADMIN — FENTANYL CITRATE 25 MCG: 50 INJECTION, SOLUTION INTRAMUSCULAR; INTRAVENOUS at 10:56

## 2023-04-18 RX ADMIN — CEFAZOLIN 2 G: 1 INJECTION, POWDER, FOR SOLUTION INTRAMUSCULAR; INTRAVENOUS at 10:48

## 2023-04-18 RX ADMIN — FENTANYL CITRATE 25 MCG: 50 INJECTION, SOLUTION INTRAMUSCULAR; INTRAVENOUS at 10:57

## 2023-04-18 RX ADMIN — FENTANYL CITRATE 50 MCG: 50 INJECTION, SOLUTION INTRAMUSCULAR; INTRAVENOUS at 10:54

## 2023-04-18 RX ADMIN — SODIUM CHLORIDE 100 UNITS: 9 INJECTION, SOLUTION INTRAMUSCULAR; INTRAVENOUS; SUBCUTANEOUS at 11:02

## 2023-04-18 RX ADMIN — SODIUM CHLORIDE, POTASSIUM CHLORIDE, SODIUM LACTATE AND CALCIUM CHLORIDE: 600; 310; 30; 20 INJECTION, SOLUTION INTRAVENOUS at 10:05

## 2023-04-18 RX ADMIN — LIDOCAINE HYDROCHLORIDE 30 MG: 20 INJECTION, SOLUTION EPIDURAL; INFILTRATION; INTRACAUDAL; PERINEURAL at 10:53

## 2023-04-18 RX ADMIN — PROPOFOL 100 MG: 10 INJECTION, EMULSION INTRAVENOUS at 10:53

## 2023-04-18 ASSESSMENT — PAIN - FUNCTIONAL ASSESSMENT
PAIN_FUNCTIONAL_ASSESSMENT: WONG-BAKER FACES
PAIN_FUNCTIONAL_ASSESSMENT: 0-10

## 2023-04-18 ASSESSMENT — PAIN SCALES - GENERAL: PAINLEVEL_OUTOF10: 0

## 2023-04-18 NOTE — PERIOP NOTE
Reviewed PTA medication list with patient/caregiver and patient/caregiver denies any additional medications. Patient admits to having a responsible adult care for them at home for at least 24 hours after surgery. Patient encouraged to use gown warming system and informed that using said warming gown to regulate body temperature prior to a procedure has been shown to help reduce the risks of blood clots and infection. Patient's pharmacy of choice verified and documented in PTA medication section. Dual skin assessment & fall risk band verification completed with Gely Gil.

## 2023-04-18 NOTE — ANESTHESIA POSTPROCEDURE EVALUATION
.  Post-Anesthesia Evaluation & Assessment    Visit Vitals  BP (!) 147/70   Pulse 86   Temp 97.6 °F (36.4 °C) (Temporal)   Resp 11   Ht 5' 5\" (1.651 m)   Wt 197 lb 6.4 oz (89.5 kg)   SpO2 96%   BMI 32.85 kg/m²       Nausea/Vomiting: no nausea    Post-operative hydration adequate.     Pain score (VAS): 0    Mental status & Level of consciousness: alert and oriented x 3    Neurological status: moves all extremities, sensation grossly intact    Pulmonary status: airway patent, no supplemental oxygen required    Complications related to anesthesia: none    Additional comments:

## 2023-04-18 NOTE — PERIOP NOTE
TRANSFER - OUT REPORT:    Verbal report given to [facility] RN  on [employer]  being transferred to phase2  for routine post-op       Report consisted of patient's Situation, Background, Assessment and   Recommendations(SBAR). Information from the following report(s) Nurse Handoff Report, Adult Overview, Surgery Report, Intake/Output, MAR, and Cardiac Rhythm NSR  was reviewed with the receiving nurse. Union Assessment: No data recorded  Lines:   Peripheral IV 04/18/23 Left Hand (Active)   Site Assessment Clean, dry & intact 04/18/23 1137   Line Status Infusing 04/18/23 1137   Phlebitis Assessment No symptoms 04/18/23 1137   Infiltration Assessment 0 04/18/23 1137   Alcohol Cap Used Yes 04/18/23 1137   Dressing Status Clean, dry & intact 04/18/23 1137   Dressing Type Transparent 04/18/23 1137        Opportunity for questions and clarification was provided.       Patient transported with:  Registered Nurse

## 2023-04-18 NOTE — DISCHARGE INSTRUCTIONS
DISCHARGE SUMMARY from Nurse    PATIENT INSTRUCTIONS:    After general anesthesia or intravenous sedation, for 24 hours or while taking prescription Narcotics:  Limit your activities  Do not drive and operate hazardous machinery  Do not make important personal or business decisions  Do  not drink alcoholic beverages  If you have not urinated within 8 hours after discharge, please contact your surgeon on call. Report the following to your surgeon:  Excessive pain, swelling, redness or odor of or around the surgical area  Temperature over 100.5  Nausea and vomiting lasting longer than 4 hours or if unable to take medications  Any signs of decreased circulation or nerve impairment to extremity: change in color, persistent  numbness, tingling, coldness or increase pain  Any questions    What to do at Home:  Recommended activity: activity as tolerated    If you experience any of the following symptoms FEVER, CHILLS, NAUSEA/VOMITING, PAIN, BLEEDING please follow up with Dr. Braden Munoz. *  Please give a list of your current medications to your Primary Care Provider. *  Please update this list whenever your medications are discontinued, doses are      changed, or new medications (including over-the-counter products) are added. *  Please carry medication information at all times in case of emergency situations. These are general instructions for a healthy lifestyle:    No smoking/ No tobacco products/ Avoid exposure to second hand smoke  Surgeon General's Warning:  Quitting smoking now greatly reduces serious risk to your health.     Obesity, smoking, and sedentary lifestyle greatly increases your risk for illness    A healthy diet, regular physical exercise & weight monitoring are important for maintaining a healthy lifestyle    You may be retaining fluid if you have a history of heart failure or if you experience any of the following symptoms:  Weight gain of 3 pounds or more overnight or 5 pounds in a week,

## 2023-04-18 NOTE — OP NOTE
Operative Note      Patient: Inocencia Brandt  YOB: 1945  MRN: [id number]    Date of Procedure: 4/18/2023    Pre-Op Diagnosis Codes:     * Urge incontinence [N39.41]    Post-Op Diagnosis: Same       Procedure(s):  CYSTOSCOPY, BOTOX INJECTION TO THE BLADDER (100 UNITS)    Surgeon(s):  Rick Mejia MD    Assistant:   * No surgical staff found *    Anesthesia: General    Estimated Blood Loss (mL): Minimal    Complications: None    Specimens:   * No specimens in log *    Implants:  * No implants in log *      Drains: * No LDAs found *    Findings: 100 units of botox injected into the bladder. There was one area of bleeding that was cauterized post injection. Detailed Description of Procedure:     Procedure Details:   Patient brought in the operating room, placed in  the supine position. After the administration of MAC anesthesia, she  was placed in lithotomy position. Groin and genitalia were prepped and  draped in the usual sterile fashion. I began with a 21-Ghanaian cystoscope. Cystourethroscopy was performed. No abnormalities were seen throughout the  bladder. I began by injecting Botox, a total of 30 injections of 1 mL each,  with a mixture of botulinum toxin type A 100 units in 9% saline mixed in 30  mL of normal saline, so a total of 30 injections were used, 3 rows of 10  were injected, 1-cm up from the trigone, then advancing up another  centimeter with the second row of injections and then 1 centimeter further  up the posterior wall. There was bleeding at one site on posterior wall that was cauterized with a bugbee. At this point, there was no evidence of any active bleeding. The bladder was emptied. The patient was then transferred to recovery room in stable condition.         Renata Almeida MD     Electronically signed by Renata Almeida MD on 4/18/2023 at 11:29 AM

## 2023-04-18 NOTE — PERIOP NOTE
TRANSFER - IN REPORT:    Verbal report received from ESTEVAN Cadet on [facility]  being received from PACU for routine progression of patient care      Report consisted of patient's Situation, Background, Assessment and   Recommendations(SBAR). Information from the following report(s) Nurse Handoff Report, Adult Overview, Surgery Report, Intake/Output, and MAR was reviewed with the receiving nurse. Opportunity for questions and clarification was provided. Assessment completed upon patient's arrival to unit and care assumed.

## 2023-04-18 NOTE — H&P
likely  sequela of urinary retention and cystitis. 3/7/2022 -- she denies flank pain or gross hematuria. Renal ultrasound (02/2022):  Personally reviewed:  Negative for hydronephrosis or stone or mass    05/05/2022:  Denies flank pain today. No gross hematuria. 6/15/2022 -- no flank pain     no gross hematuria    08/26/2022:  Patient reports no flank pain, nausea or vomiting, fever or chills or gross hematuria. 1/20/2023 -- She ahd the flu and a UTI and went to [facility] for 5 days 11/2022/  CT (11/29/2022) - negative for hydro bialterally    3/22/2023 -- No flank pain or radiation. 3.  Overactive Bladder   Onset was gradual. Severity level is moderate-severe. It occurs daily. The problem is with no change. Associated symptoms include pressure, urgency and urinary incontinence (urgency). Pertinent negatives include chills, constipation and fever. Additional information: She is weariing 3 heavy pads per day at baseline. 5/19/2022 -- this remains consistent. .            Comments: 6/15/2022 --  previously gemtesa with no help She is doing about the same. she has an issue of a significant problem with incontinence of completely emptying her bladder last week. But otherwise she wears 2 heavy pads per day due to mild leaking and would only worth urgency if she has a major incontinent episode which do not occur every day. She has frequency every 2 hours during day and night. 08/26/2022:  Patient returns urology clinic today for follow-up of starting trospium patient has had up and down episodes through the night of nocturia enough that she is not getting much rest but on week 7 patient had 1 4 hour episode of being able to sleep and the rest of the time was up and down with her nocturia then on week 8 had two 3 hour periods of rest.  So seems to be improving over time.   Patient reports that she is doing better as far as her pad use she is not having isn't much incontinence or leakage and she is currently

## 2023-04-18 NOTE — ANESTHESIA PRE PROCEDURE
07:10 AM    BILITOT 1.0 11/29/2022 10:15 AM    ALKPHOS 60 11/29/2022 10:15 AM    AST 48 11/29/2022 10:15 AM    ALT 27 11/29/2022 10:15 AM       POC Tests:   Recent Labs     04/18/23  1007   POCGLU 101       Coags:   Lab Results   Component Value Date/Time    PROTIME 13.1 11/19/2021 11:01 AM    INR 1.0 11/19/2021 11:01 AM       HCG (If Applicable): No results found for: PREGTESTUR, PREGSERUM, HCG, HCGQUANT     ABGs: No results found for: PHART, PO2ART, MYZ3ACI, VPK1EPZ, BEART, A9VHZGMJ     Type & Screen (If Applicable):  No results found for: LABABO, LABRH    Drug/Infectious Status (If Applicable):  No results found for: HIV, HEPCAB    COVID-19 Screening (If Applicable):   Lab Results   Component Value Date/Time    COVID19 Not detected 11/29/2022 10:45 AM           Anesthesia Evaluation  Patient summary reviewed and Nursing notes reviewed  Airway: Mallampati: I  TM distance: >3 FB   Neck ROM: full  Mouth opening: < 3 FB   Dental:    (+) caps      Pulmonary:Negative Pulmonary ROS and normal exam                               Cardiovascular:    (+) hypertension: no interval change, hyperlipidemia        Rhythm: regular  Rate: normal                    Neuro/Psych:   Negative Neuro/Psych ROS              GI/Hepatic/Renal: Neg GI/Hepatic/Renal ROS            Endo/Other:    (+) Diabetesusing insulin, . Abdominal:             Vascular: negative vascular ROS. Other Findings:           Anesthesia Plan      general     ASA 3             Anesthetic plan and risks discussed with patient.         Attending anesthesiologist reviewed and agrees with Preprocedure content                Arely Sierra MD   4/18/2023

## 2023-06-16 ENCOUNTER — HOSPITAL ENCOUNTER (OUTPATIENT)
Facility: HOSPITAL | Age: 78
Discharge: HOME | End: 2023-06-19
Payer: MEDICARE

## 2023-06-16 DIAGNOSIS — M16.11 OSTEOARTHRITIS OF RIGHT HIP, UNSPECIFIED OSTEOARTHRITIS TYPE: ICD-10-CM

## 2023-06-16 LAB
APTT PPP: 31 SEC (ref 23–36.4)
BASOPHILS # BLD: 0 K/UL (ref 0–0.1)
BASOPHILS NFR BLD: 0 % (ref 0–2)
DIFFERENTIAL METHOD BLD: ABNORMAL
EOSINOPHIL # BLD: 0.2 K/UL (ref 0–0.4)
EOSINOPHIL NFR BLD: 2 % (ref 0–5)
ERYTHROCYTE [DISTWIDTH] IN BLOOD BY AUTOMATED COUNT: 14.5 % (ref 11.6–14.5)
ERYTHROCYTE [SEDIMENTATION RATE] IN BLOOD: 31 MM/HR (ref 0–30)
EST. AVERAGE GLUCOSE BLD GHB EST-MCNC: 157 MG/DL
HBA1C MFR BLD: 7.1 % (ref 4.2–5.6)
HCT VFR BLD AUTO: 37.3 % (ref 35–45)
HGB BLD-MCNC: 12.1 G/DL (ref 12–16)
IMM GRANULOCYTES # BLD AUTO: 0 K/UL (ref 0–0.04)
IMM GRANULOCYTES NFR BLD AUTO: 0 % (ref 0–0.5)
INR PPP: 1 (ref 0.8–1.2)
LYMPHOCYTES # BLD: 2.7 K/UL (ref 0.9–3.6)
LYMPHOCYTES NFR BLD: 29 % (ref 21–52)
MCH RBC QN AUTO: 29.3 PG (ref 24–34)
MCHC RBC AUTO-ENTMCNC: 32.4 G/DL (ref 31–37)
MCV RBC AUTO: 90.3 FL (ref 78–100)
MONOCYTES # BLD: 1.2 K/UL (ref 0.05–1.2)
MONOCYTES NFR BLD: 13 % (ref 3–10)
NEUTS SEG # BLD: 5.3 K/UL (ref 1.8–8)
NEUTS SEG NFR BLD: 56 % (ref 40–73)
NRBC # BLD: 0 K/UL (ref 0–0.01)
NRBC BLD-RTO: 0 PER 100 WBC
PLATELET # BLD AUTO: 325 K/UL (ref 135–420)
PMV BLD AUTO: 9.8 FL (ref 9.2–11.8)
PROTHROMBIN TIME: 13.3 SEC (ref 11.5–15.2)
RBC # BLD AUTO: 4.13 M/UL (ref 4.2–5.3)
WBC # BLD AUTO: 9.5 K/UL (ref 4.6–13.2)

## 2023-06-16 PROCEDURE — 85730 THROMBOPLASTIN TIME PARTIAL: CPT

## 2023-06-16 PROCEDURE — 85610 PROTHROMBIN TIME: CPT

## 2023-06-16 PROCEDURE — 36415 COLL VENOUS BLD VENIPUNCTURE: CPT

## 2023-06-16 PROCEDURE — 85025 COMPLETE CBC W/AUTO DIFF WBC: CPT

## 2023-06-16 PROCEDURE — 85652 RBC SED RATE AUTOMATED: CPT

## 2023-06-16 PROCEDURE — 83036 HEMOGLOBIN GLYCOSYLATED A1C: CPT

## 2023-06-18 LAB
BACTERIA SPEC CULT: NORMAL
BACTERIA SPEC CULT: NORMAL
SERVICE CMNT-IMP: NORMAL

## 2023-06-23 ENCOUNTER — ANESTHESIA EVENT (OUTPATIENT)
Facility: HOSPITAL | Age: 78
End: 2023-06-23
Payer: MEDICARE

## 2023-06-25 PROBLEM — M16.11 OSTEOARTHRITIS OF RIGHT HIP: Chronic | Status: ACTIVE | Noted: 2023-06-25

## 2023-06-25 RX ORDER — OXYCODONE HYDROCHLORIDE 5 MG/1
10 TABLET ORAL EVERY 4 HOURS PRN
Status: CANCELLED | OUTPATIENT
Start: 2023-06-25

## 2023-06-25 RX ORDER — KETOROLAC TROMETHAMINE 15 MG/ML
15 INJECTION, SOLUTION INTRAMUSCULAR; INTRAVENOUS EVERY 6 HOURS
Status: CANCELLED | OUTPATIENT
Start: 2023-06-25 | End: 2023-06-28

## 2023-06-25 RX ORDER — SODIUM CHLORIDE 0.9 % (FLUSH) 0.9 %
5-40 SYRINGE (ML) INJECTION PRN
Status: CANCELLED | OUTPATIENT
Start: 2023-06-25

## 2023-06-25 RX ORDER — SODIUM CHLORIDE 9 MG/ML
INJECTION, SOLUTION INTRAVENOUS CONTINUOUS
Status: CANCELLED | OUTPATIENT
Start: 2023-06-25 | End: 2023-06-25

## 2023-06-25 RX ORDER — ONDANSETRON 2 MG/ML
4 INJECTION INTRAMUSCULAR; INTRAVENOUS EVERY 6 HOURS PRN
Status: CANCELLED | OUTPATIENT
Start: 2023-06-25

## 2023-06-25 RX ORDER — SODIUM CHLORIDE 9 MG/ML
INJECTION, SOLUTION INTRAVENOUS CONTINUOUS
Status: CANCELLED | OUTPATIENT
Start: 2023-06-25

## 2023-06-25 RX ORDER — ACETAMINOPHEN 325 MG/1
650 TABLET ORAL EVERY 6 HOURS
Status: CANCELLED | OUTPATIENT
Start: 2023-06-25

## 2023-06-25 RX ORDER — DEXAMETHASONE SODIUM PHOSPHATE 4 MG/ML
8 INJECTION, SOLUTION INTRA-ARTICULAR; INTRALESIONAL; INTRAMUSCULAR; INTRAVENOUS; SOFT TISSUE ONCE
Status: CANCELLED | OUTPATIENT
Start: 2023-06-25 | End: 2023-06-25

## 2023-06-25 RX ORDER — DIPHENHYDRAMINE HYDROCHLORIDE 50 MG/ML
25 INJECTION INTRAMUSCULAR; INTRAVENOUS EVERY 6 HOURS PRN
Status: CANCELLED | OUTPATIENT
Start: 2023-06-25

## 2023-06-25 RX ORDER — OXYCODONE HYDROCHLORIDE 5 MG/1
5 TABLET ORAL EVERY 4 HOURS PRN
Status: CANCELLED | OUTPATIENT
Start: 2023-06-25

## 2023-06-25 RX ORDER — DIPHENHYDRAMINE HCL 25 MG
25 CAPSULE ORAL EVERY 6 HOURS PRN
Status: CANCELLED | OUTPATIENT
Start: 2023-06-25

## 2023-06-26 ENCOUNTER — ANESTHESIA (OUTPATIENT)
Facility: HOSPITAL | Age: 78
End: 2023-06-26
Payer: MEDICARE

## 2023-06-26 ENCOUNTER — HOSPITAL ENCOUNTER (OUTPATIENT)
Facility: HOSPITAL | Age: 78
Setting detail: OUTPATIENT SURGERY
Discharge: HOME | End: 2023-06-26
Attending: ORTHOPAEDIC SURGERY | Admitting: ORTHOPAEDIC SURGERY
Payer: MEDICARE

## 2023-06-26 VITALS
HEIGHT: 63 IN | RESPIRATION RATE: 18 BRPM | BODY MASS INDEX: 35.08 KG/M2 | OXYGEN SATURATION: 97 % | HEART RATE: 102 BPM | SYSTOLIC BLOOD PRESSURE: 147 MMHG | TEMPERATURE: 98.1 F | WEIGHT: 198 LBS | DIASTOLIC BLOOD PRESSURE: 87 MMHG

## 2023-06-26 DIAGNOSIS — M16.11 PRIMARY OSTEOARTHRITIS OF RIGHT HIP: ICD-10-CM

## 2023-06-26 LAB
ABO + RH BLD: NORMAL
BLOOD GROUP ANTIBODIES SERPL: NORMAL
GLUCOSE BLD STRIP.AUTO-MCNC: 119 MG/DL (ref 70–110)
SPECIMEN EXP DATE BLD: NORMAL

## 2023-06-26 PROCEDURE — 2580000003 HC RX 258: Performed by: ORTHOPAEDIC SURGERY

## 2023-06-26 PROCEDURE — C1713 ANCHOR/SCREW BN/BN,TIS/BN: HCPCS | Performed by: ORTHOPAEDIC SURGERY

## 2023-06-26 PROCEDURE — 3600000005 HC SURGERY LEVEL 5 BASE: Performed by: ORTHOPAEDIC SURGERY

## 2023-06-26 PROCEDURE — 7100000001 HC PACU RECOVERY - ADDTL 15 MIN: Performed by: ORTHOPAEDIC SURGERY

## 2023-06-26 PROCEDURE — 86900 BLOOD TYPING SEROLOGIC ABO: CPT

## 2023-06-26 PROCEDURE — 86850 RBC ANTIBODY SCREEN: CPT

## 2023-06-26 PROCEDURE — 2580000003 HC RX 258: Performed by: PHYSICIAN ASSISTANT

## 2023-06-26 PROCEDURE — 82962 GLUCOSE BLOOD TEST: CPT

## 2023-06-26 PROCEDURE — 86901 BLOOD TYPING SEROLOGIC RH(D): CPT

## 2023-06-26 PROCEDURE — 7100000000 HC PACU RECOVERY - FIRST 15 MIN: Performed by: ORTHOPAEDIC SURGERY

## 2023-06-26 PROCEDURE — 2720000010 HC SURG SUPPLY STERILE: Performed by: ORTHOPAEDIC SURGERY

## 2023-06-26 PROCEDURE — 6360000002 HC RX W HCPCS: Performed by: ORTHOPAEDIC SURGERY

## 2023-06-26 PROCEDURE — 6370000000 HC RX 637 (ALT 250 FOR IP): Performed by: PHYSICIAN ASSISTANT

## 2023-06-26 PROCEDURE — 2709999900 HC NON-CHARGEABLE SUPPLY: Performed by: ORTHOPAEDIC SURGERY

## 2023-06-26 PROCEDURE — 7100000011 HC PHASE II RECOVERY - ADDTL 15 MIN: Performed by: ORTHOPAEDIC SURGERY

## 2023-06-26 PROCEDURE — 6360000002 HC RX W HCPCS: Performed by: NURSE ANESTHETIST, CERTIFIED REGISTERED

## 2023-06-26 PROCEDURE — 7100000010 HC PHASE II RECOVERY - FIRST 15 MIN: Performed by: ORTHOPAEDIC SURGERY

## 2023-06-26 PROCEDURE — 36415 COLL VENOUS BLD VENIPUNCTURE: CPT

## 2023-06-26 RX ORDER — LABETALOL HYDROCHLORIDE 5 MG/ML
10 INJECTION, SOLUTION INTRAVENOUS EVERY 10 MIN PRN
Status: DISCONTINUED | OUTPATIENT
Start: 2023-06-26 | End: 2023-06-26 | Stop reason: HOSPADM

## 2023-06-26 RX ORDER — CHLORHEXIDINE GLUCONATE 4 G/100ML
SOLUTION TOPICAL ONCE
Status: DISCONTINUED | OUTPATIENT
Start: 2023-06-26 | End: 2023-06-26 | Stop reason: HOSPADM

## 2023-06-26 RX ORDER — SODIUM CHLORIDE 0.9 % (FLUSH) 0.9 %
5-40 SYRINGE (ML) INJECTION PRN
Status: CANCELLED | OUTPATIENT
Start: 2023-06-26

## 2023-06-26 RX ORDER — MIDAZOLAM HYDROCHLORIDE 1 MG/ML
INJECTION INTRAMUSCULAR; INTRAVENOUS PRN
Status: DISCONTINUED | OUTPATIENT
Start: 2023-06-26 | End: 2023-06-26 | Stop reason: SDUPTHER

## 2023-06-26 RX ORDER — FENTANYL CITRATE 50 UG/ML
25 INJECTION, SOLUTION INTRAMUSCULAR; INTRAVENOUS EVERY 5 MIN PRN
Status: CANCELLED | OUTPATIENT
Start: 2023-06-26

## 2023-06-26 RX ORDER — ONDANSETRON 2 MG/ML
4 INJECTION INTRAMUSCULAR; INTRAVENOUS
Status: CANCELLED | OUTPATIENT
Start: 2023-06-26 | End: 2023-06-27

## 2023-06-26 RX ORDER — LABETALOL HYDROCHLORIDE 5 MG/ML
10 INJECTION, SOLUTION INTRAVENOUS
Status: CANCELLED | OUTPATIENT
Start: 2023-06-26

## 2023-06-26 RX ORDER — HYDROMORPHONE HYDROCHLORIDE 1 MG/ML
0.25 INJECTION, SOLUTION INTRAMUSCULAR; INTRAVENOUS; SUBCUTANEOUS EVERY 5 MIN PRN
Status: CANCELLED | OUTPATIENT
Start: 2023-06-26

## 2023-06-26 RX ORDER — SODIUM CHLORIDE, SODIUM LACTATE, POTASSIUM CHLORIDE, CALCIUM CHLORIDE 600; 310; 30; 20 MG/100ML; MG/100ML; MG/100ML; MG/100ML
INJECTION, SOLUTION INTRAVENOUS CONTINUOUS
Status: CANCELLED | OUTPATIENT
Start: 2023-06-26

## 2023-06-26 RX ORDER — SODIUM CHLORIDE, SODIUM LACTATE, POTASSIUM CHLORIDE, AND CALCIUM CHLORIDE .6; .31; .03; .02 G/100ML; G/100ML; G/100ML; G/100ML
1000 INJECTION, SOLUTION INTRAVENOUS ONCE
Status: COMPLETED | OUTPATIENT
Start: 2023-06-26 | End: 2023-06-26

## 2023-06-26 RX ORDER — DROPERIDOL 2.5 MG/ML
0.62 INJECTION, SOLUTION INTRAMUSCULAR; INTRAVENOUS
Status: CANCELLED | OUTPATIENT
Start: 2023-06-26 | End: 2023-06-27

## 2023-06-26 RX ORDER — DIPHENHYDRAMINE HYDROCHLORIDE 50 MG/ML
12.5 INJECTION INTRAMUSCULAR; INTRAVENOUS
Status: CANCELLED | OUTPATIENT
Start: 2023-06-26 | End: 2023-06-27

## 2023-06-26 RX ORDER — SODIUM CHLORIDE 9 MG/ML
INJECTION, SOLUTION INTRAVENOUS PRN
Status: CANCELLED | OUTPATIENT
Start: 2023-06-26

## 2023-06-26 RX ORDER — SODIUM CHLORIDE 0.9 % (FLUSH) 0.9 %
5-40 SYRINGE (ML) INJECTION EVERY 12 HOURS SCHEDULED
Status: CANCELLED | OUTPATIENT
Start: 2023-06-26

## 2023-06-26 RX ORDER — SODIUM CHLORIDE, SODIUM LACTATE, POTASSIUM CHLORIDE, CALCIUM CHLORIDE 600; 310; 30; 20 MG/100ML; MG/100ML; MG/100ML; MG/100ML
INJECTION, SOLUTION INTRAVENOUS CONTINUOUS
Status: DISCONTINUED | OUTPATIENT
Start: 2023-06-26 | End: 2023-06-26 | Stop reason: HOSPADM

## 2023-06-26 RX ORDER — PANTOPRAZOLE SODIUM 40 MG/1
40 TABLET, DELAYED RELEASE ORAL ONCE
Status: COMPLETED | OUTPATIENT
Start: 2023-06-26 | End: 2023-06-26

## 2023-06-26 RX ORDER — MIDAZOLAM HYDROCHLORIDE 2 MG/2ML
2 INJECTION, SOLUTION INTRAMUSCULAR; INTRAVENOUS
Status: CANCELLED | OUTPATIENT
Start: 2023-06-26 | End: 2023-06-27

## 2023-06-26 RX ORDER — ACETAMINOPHEN 500 MG
1000 TABLET ORAL ONCE
Status: COMPLETED | OUTPATIENT
Start: 2023-06-26 | End: 2023-06-26

## 2023-06-26 RX ORDER — TRANEXAMIC ACID 650 MG/1
1950 TABLET ORAL ONCE
Status: COMPLETED | OUTPATIENT
Start: 2023-06-26 | End: 2023-06-26

## 2023-06-26 RX ORDER — DEXAMETHASONE SODIUM PHOSPHATE 4 MG/ML
4 INJECTION, SOLUTION INTRA-ARTICULAR; INTRALESIONAL; INTRAMUSCULAR; INTRAVENOUS; SOFT TISSUE ONCE
Status: COMPLETED | OUTPATIENT
Start: 2023-06-26 | End: 2023-06-26

## 2023-06-26 RX ADMIN — TRANEXAMIC ACID 1950 MG: 650 TABLET ORAL at 07:28

## 2023-06-26 RX ADMIN — DEXAMETHASONE SODIUM PHOSPHATE 4 MG: 4 INJECTION, SOLUTION INTRAMUSCULAR; INTRAVENOUS at 07:57

## 2023-06-26 RX ADMIN — PANTOPRAZOLE SODIUM 40 MG: 40 TABLET, DELAYED RELEASE ORAL at 07:33

## 2023-06-26 RX ADMIN — SODIUM CHLORIDE, POTASSIUM CHLORIDE, SODIUM LACTATE AND CALCIUM CHLORIDE 1000 ML: 600; 310; 30; 20 INJECTION, SOLUTION INTRAVENOUS at 07:57

## 2023-06-26 RX ADMIN — MIDAZOLAM 2 MG: 1 INJECTION INTRAMUSCULAR; INTRAVENOUS at 09:25

## 2023-06-26 RX ADMIN — SODIUM CHLORIDE, POTASSIUM CHLORIDE, SODIUM LACTATE AND CALCIUM CHLORIDE: 600; 310; 30; 20 INJECTION, SOLUTION INTRAVENOUS at 07:57

## 2023-06-26 RX ADMIN — ACETAMINOPHEN 1000 MG: 500 TABLET ORAL at 07:33

## 2023-06-26 ASSESSMENT — PAIN SCALES - GENERAL
PAINLEVEL_OUTOF10: 10

## 2023-06-26 ASSESSMENT — PAIN DESCRIPTION - LOCATION
LOCATION: HIP
LOCATION: HIP

## 2023-06-26 ASSESSMENT — PAIN DESCRIPTION - PAIN TYPE: TYPE: CHRONIC PAIN

## 2023-06-26 ASSESSMENT — PAIN DESCRIPTION - DESCRIPTORS
DESCRIPTORS: SHARP;STABBING;THROBBING
DESCRIPTORS: SHARP

## 2023-06-26 ASSESSMENT — PAIN - FUNCTIONAL ASSESSMENT: PAIN_FUNCTIONAL_ASSESSMENT: 0-10

## 2023-06-26 ASSESSMENT — PAIN DESCRIPTION - ORIENTATION: ORIENTATION: RIGHT

## 2023-08-21 ENCOUNTER — HOSPITAL ENCOUNTER (OUTPATIENT)
Facility: HOSPITAL | Age: 78
Discharge: HOME | End: 2023-08-24
Payer: MEDICARE

## 2023-08-21 DIAGNOSIS — M16.11 PRIMARY OSTEOARTHRITIS OF RIGHT HIP: ICD-10-CM

## 2023-08-21 LAB
ALBUMIN SERPL-MCNC: 3.8 G/DL (ref 3.4–5)
ALBUMIN/GLOB SERPL: 1 (ref 0.8–1.7)
ALP SERPL-CCNC: 77 U/L (ref 45–117)
ALT SERPL-CCNC: 18 U/L (ref 13–56)
ANION GAP SERPL CALC-SCNC: 8 MMOL/L (ref 3–18)
APPEARANCE UR: ABNORMAL
APTT PPP: 30.3 SEC (ref 23–36.4)
AST SERPL-CCNC: 11 U/L (ref 10–38)
BACTERIA URNS QL MICRO: ABNORMAL /HPF
BASOPHILS # BLD: 0 K/UL (ref 0–0.1)
BASOPHILS NFR BLD: 1 % (ref 0–2)
BILIRUB SERPL-MCNC: 0.4 MG/DL (ref 0.2–1)
BILIRUB UR QL: NEGATIVE
BUN SERPL-MCNC: 21 MG/DL (ref 7–18)
BUN/CREAT SERPL: 25 (ref 12–20)
CALCIUM SERPL-MCNC: 10.3 MG/DL (ref 8.5–10.1)
CHLORIDE SERPL-SCNC: 105 MMOL/L (ref 100–111)
CO2 SERPL-SCNC: 27 MMOL/L (ref 21–32)
COLOR UR: YELLOW
CREAT SERPL-MCNC: 0.84 MG/DL (ref 0.6–1.3)
DIFFERENTIAL METHOD BLD: ABNORMAL
EOSINOPHIL # BLD: 0.1 K/UL (ref 0–0.4)
EOSINOPHIL NFR BLD: 2 % (ref 0–5)
EPITH CASTS URNS QL MICRO: ABNORMAL /LPF (ref 0–5)
ERYTHROCYTE [DISTWIDTH] IN BLOOD BY AUTOMATED COUNT: 14.1 % (ref 11.6–14.5)
ERYTHROCYTE [SEDIMENTATION RATE] IN BLOOD: 32 MM/HR (ref 0–30)
EST. AVERAGE GLUCOSE BLD GHB EST-MCNC: 160 MG/DL
GLOBULIN SER CALC-MCNC: 3.7 G/DL (ref 2–4)
GLUCOSE SERPL-MCNC: 91 MG/DL (ref 74–99)
GLUCOSE UR STRIP.AUTO-MCNC: NEGATIVE MG/DL
HBA1C MFR BLD: 7.2 % (ref 4.2–5.6)
HCT VFR BLD AUTO: 36.4 % (ref 35–45)
HGB BLD-MCNC: 11.8 G/DL (ref 12–16)
HGB UR QL STRIP: ABNORMAL
IMM GRANULOCYTES # BLD AUTO: 0 K/UL (ref 0–0.04)
IMM GRANULOCYTES NFR BLD AUTO: 0 % (ref 0–0.5)
INR PPP: 1 (ref 0.9–1.1)
KETONES UR QL STRIP.AUTO: NEGATIVE MG/DL
LEUKOCYTE ESTERASE UR QL STRIP.AUTO: ABNORMAL
LYMPHOCYTES # BLD: 1.9 K/UL (ref 0.9–3.6)
LYMPHOCYTES NFR BLD: 27 % (ref 21–52)
MCH RBC QN AUTO: 28.7 PG (ref 24–34)
MCHC RBC AUTO-ENTMCNC: 32.4 G/DL (ref 31–37)
MCV RBC AUTO: 88.6 FL (ref 78–100)
MONOCYTES # BLD: 0.9 K/UL (ref 0.05–1.2)
MONOCYTES NFR BLD: 12 % (ref 3–10)
NEUTS SEG # BLD: 4.1 K/UL (ref 1.8–8)
NEUTS SEG NFR BLD: 58 % (ref 40–73)
NITRITE UR QL STRIP.AUTO: NEGATIVE
NRBC # BLD: 0 K/UL (ref 0–0.01)
NRBC BLD-RTO: 0 PER 100 WBC
PH UR STRIP: 5.5 (ref 5–8)
PLATELET # BLD AUTO: 279 K/UL (ref 135–420)
PMV BLD AUTO: 11 FL (ref 9.2–11.8)
POTASSIUM SERPL-SCNC: 4 MMOL/L (ref 3.5–5.5)
PROT SERPL-MCNC: 7.5 G/DL (ref 6.4–8.2)
PROT UR STRIP-MCNC: 30 MG/DL
PROTHROMBIN TIME: 13.4 SEC (ref 11.9–14.7)
RBC # BLD AUTO: 4.11 M/UL (ref 4.2–5.3)
RBC #/AREA URNS HPF: NEGATIVE /HPF (ref 0–5)
SODIUM SERPL-SCNC: 140 MMOL/L (ref 136–145)
SP GR UR REFRACTOMETRY: 1.02 (ref 1–1.03)
UROBILINOGEN UR QL STRIP.AUTO: 0.2 EU/DL (ref 0.2–1)
WBC # BLD AUTO: 7.1 K/UL (ref 4.6–13.2)
WBC URNS QL MICRO: ABNORMAL /HPF (ref 0–5)

## 2023-08-21 PROCEDURE — 85730 THROMBOPLASTIN TIME PARTIAL: CPT

## 2023-08-21 PROCEDURE — 81001 URINALYSIS AUTO W/SCOPE: CPT

## 2023-08-21 PROCEDURE — 93005 ELECTROCARDIOGRAM TRACING: CPT

## 2023-08-21 PROCEDURE — 80053 COMPREHEN METABOLIC PANEL: CPT

## 2023-08-21 PROCEDURE — 85025 COMPLETE CBC W/AUTO DIFF WBC: CPT

## 2023-08-21 PROCEDURE — 36415 COLL VENOUS BLD VENIPUNCTURE: CPT

## 2023-08-21 PROCEDURE — 83036 HEMOGLOBIN GLYCOSYLATED A1C: CPT

## 2023-08-21 PROCEDURE — 85652 RBC SED RATE AUTOMATED: CPT

## 2023-08-21 PROCEDURE — 85610 PROTHROMBIN TIME: CPT

## 2023-08-23 LAB
BACTERIA SPEC CULT: NORMAL
BACTERIA SPEC CULT: NORMAL
EKG ATRIAL RATE: 78 BPM
EKG DIAGNOSIS: NORMAL
EKG P AXIS: 42 DEGREES
EKG P-R INTERVAL: 224 MS
EKG Q-T INTERVAL: 382 MS
EKG QRS DURATION: 74 MS
EKG QTC CALCULATION (BAZETT): 435 MS
EKG R AXIS: 21 DEGREES
EKG T AXIS: 59 DEGREES
EKG VENTRICULAR RATE: 78 BPM
SERVICE CMNT-IMP: NORMAL

## 2023-08-25 ENCOUNTER — ANESTHESIA EVENT (OUTPATIENT)
Facility: HOSPITAL | Age: 78
End: 2023-08-25
Payer: MEDICARE

## 2023-08-27 RX ORDER — SODIUM CHLORIDE 0.9 % (FLUSH) 0.9 %
5-40 SYRINGE (ML) INJECTION PRN
Status: CANCELLED | OUTPATIENT
Start: 2023-08-27

## 2023-08-27 RX ORDER — OXYCODONE HYDROCHLORIDE 5 MG/1
10 TABLET ORAL EVERY 4 HOURS PRN
Status: CANCELLED | OUTPATIENT
Start: 2023-08-27

## 2023-08-27 RX ORDER — ONDANSETRON 2 MG/ML
4 INJECTION INTRAMUSCULAR; INTRAVENOUS EVERY 6 HOURS PRN
Status: CANCELLED | OUTPATIENT
Start: 2023-08-27

## 2023-08-27 RX ORDER — DIPHENHYDRAMINE HCL 25 MG
25 CAPSULE ORAL EVERY 6 HOURS PRN
Status: CANCELLED | OUTPATIENT
Start: 2023-08-27

## 2023-08-27 RX ORDER — SODIUM CHLORIDE 9 MG/ML
INJECTION, SOLUTION INTRAVENOUS CONTINUOUS
Status: CANCELLED | OUTPATIENT
Start: 2023-08-27 | End: 2023-08-27

## 2023-08-27 RX ORDER — SODIUM CHLORIDE 9 MG/ML
INJECTION, SOLUTION INTRAVENOUS CONTINUOUS
Status: CANCELLED | OUTPATIENT
Start: 2023-08-27

## 2023-08-27 RX ORDER — ACETAMINOPHEN 325 MG/1
650 TABLET ORAL EVERY 6 HOURS
Status: CANCELLED | OUTPATIENT
Start: 2023-08-27

## 2023-08-27 RX ORDER — DIPHENHYDRAMINE HYDROCHLORIDE 50 MG/ML
25 INJECTION INTRAMUSCULAR; INTRAVENOUS EVERY 6 HOURS PRN
Status: CANCELLED | OUTPATIENT
Start: 2023-08-27

## 2023-08-27 RX ORDER — OXYCODONE HYDROCHLORIDE 5 MG/1
5 TABLET ORAL EVERY 4 HOURS PRN
Status: CANCELLED | OUTPATIENT
Start: 2023-08-27

## 2023-08-27 RX ORDER — KETOROLAC TROMETHAMINE 15 MG/ML
15 INJECTION, SOLUTION INTRAMUSCULAR; INTRAVENOUS EVERY 6 HOURS
Status: CANCELLED | OUTPATIENT
Start: 2023-08-27 | End: 2023-08-30

## 2023-08-28 ENCOUNTER — APPOINTMENT (OUTPATIENT)
Facility: HOSPITAL | Age: 78
End: 2023-08-28
Attending: ORTHOPAEDIC SURGERY
Payer: MEDICARE

## 2023-08-28 ENCOUNTER — HOSPITAL ENCOUNTER (OUTPATIENT)
Facility: HOSPITAL | Age: 78
Setting detail: OUTPATIENT SURGERY
Discharge: HOME | End: 2023-08-28
Attending: ORTHOPAEDIC SURGERY | Admitting: ORTHOPAEDIC SURGERY
Payer: MEDICARE

## 2023-08-28 ENCOUNTER — ANESTHESIA (OUTPATIENT)
Facility: HOSPITAL | Age: 78
End: 2023-08-28
Payer: MEDICARE

## 2023-08-28 VITALS
SYSTOLIC BLOOD PRESSURE: 132 MMHG | TEMPERATURE: 97.8 F | HEART RATE: 91 BPM | WEIGHT: 190.5 LBS | HEIGHT: 63 IN | OXYGEN SATURATION: 99 % | BODY MASS INDEX: 33.75 KG/M2 | DIASTOLIC BLOOD PRESSURE: 66 MMHG | RESPIRATION RATE: 15 BRPM

## 2023-08-28 DIAGNOSIS — M16.11 PRIMARY OSTEOARTHRITIS OF RIGHT HIP: Primary | Chronic | ICD-10-CM

## 2023-08-28 LAB
ABO + RH BLD: NORMAL
BLOOD GROUP ANTIBODIES SERPL: NORMAL
GLUCOSE BLD STRIP.AUTO-MCNC: 118 MG/DL (ref 70–110)
GLUCOSE BLD STRIP.AUTO-MCNC: 170 MG/DL (ref 70–110)
SPECIMEN EXP DATE BLD: NORMAL

## 2023-08-28 PROCEDURE — 6360000002 HC RX W HCPCS

## 2023-08-28 PROCEDURE — 2709999900 HC NON-CHARGEABLE SUPPLY: Performed by: ORTHOPAEDIC SURGERY

## 2023-08-28 PROCEDURE — 7100000010 HC PHASE II RECOVERY - FIRST 15 MIN: Performed by: ORTHOPAEDIC SURGERY

## 2023-08-28 PROCEDURE — 3700000000 HC ANESTHESIA ATTENDED CARE: Performed by: ORTHOPAEDIC SURGERY

## 2023-08-28 PROCEDURE — 2500000003 HC RX 250 WO HCPCS: Performed by: ORTHOPAEDIC SURGERY

## 2023-08-28 PROCEDURE — 2580000003 HC RX 258: Performed by: ORTHOPAEDIC SURGERY

## 2023-08-28 PROCEDURE — 97161 PT EVAL LOW COMPLEX 20 MIN: CPT

## 2023-08-28 PROCEDURE — 6370000000 HC RX 637 (ALT 250 FOR IP): Performed by: PHYSICIAN ASSISTANT

## 2023-08-28 PROCEDURE — 36415 COLL VENOUS BLD VENIPUNCTURE: CPT

## 2023-08-28 PROCEDURE — 7100000001 HC PACU RECOVERY - ADDTL 15 MIN: Performed by: ORTHOPAEDIC SURGERY

## 2023-08-28 PROCEDURE — 82962 GLUCOSE BLOOD TEST: CPT

## 2023-08-28 PROCEDURE — 3600000005 HC SURGERY LEVEL 5 BASE: Performed by: ORTHOPAEDIC SURGERY

## 2023-08-28 PROCEDURE — 86900 BLOOD TYPING SEROLOGIC ABO: CPT

## 2023-08-28 PROCEDURE — 86850 RBC ANTIBODY SCREEN: CPT

## 2023-08-28 PROCEDURE — 6360000002 HC RX W HCPCS: Performed by: ANESTHESIOLOGY

## 2023-08-28 PROCEDURE — 97535 SELF CARE MNGMENT TRAINING: CPT

## 2023-08-28 PROCEDURE — 6360000002 HC RX W HCPCS: Performed by: ORTHOPAEDIC SURGERY

## 2023-08-28 PROCEDURE — C1713 ANCHOR/SCREW BN/BN,TIS/BN: HCPCS | Performed by: ORTHOPAEDIC SURGERY

## 2023-08-28 PROCEDURE — 3700000001 HC ADD 15 MINUTES (ANESTHESIA): Performed by: ORTHOPAEDIC SURGERY

## 2023-08-28 PROCEDURE — C1776 JOINT DEVICE (IMPLANTABLE): HCPCS | Performed by: ORTHOPAEDIC SURGERY

## 2023-08-28 PROCEDURE — 2500000003 HC RX 250 WO HCPCS

## 2023-08-28 PROCEDURE — 73501 X-RAY EXAM HIP UNI 1 VIEW: CPT

## 2023-08-28 PROCEDURE — 2720000010 HC SURG SUPPLY STERILE: Performed by: ORTHOPAEDIC SURGERY

## 2023-08-28 PROCEDURE — 7100000011 HC PHASE II RECOVERY - ADDTL 15 MIN: Performed by: ORTHOPAEDIC SURGERY

## 2023-08-28 PROCEDURE — 6360000002 HC RX W HCPCS: Performed by: PHYSICIAN ASSISTANT

## 2023-08-28 PROCEDURE — 2580000003 HC RX 258

## 2023-08-28 PROCEDURE — 3600000015 HC SURGERY LEVEL 5 ADDTL 15MIN: Performed by: ORTHOPAEDIC SURGERY

## 2023-08-28 PROCEDURE — 7100000000 HC PACU RECOVERY - FIRST 15 MIN: Performed by: ORTHOPAEDIC SURGERY

## 2023-08-28 PROCEDURE — 97165 OT EVAL LOW COMPLEX 30 MIN: CPT

## 2023-08-28 PROCEDURE — 97116 GAIT TRAINING THERAPY: CPT

## 2023-08-28 PROCEDURE — 86901 BLOOD TYPING SEROLOGIC RH(D): CPT

## 2023-08-28 DEVICE — SIZE 10 STD COLLAR
Type: IMPLANTABLE DEVICE | Site: HIP | Status: FUNCTIONAL
Brand: ENTRADA HIP STEM

## 2023-08-28 DEVICE — THREE HOLE, 52 MM
Type: IMPLANTABLE DEVICE | Site: HIP | Status: FUNCTIONAL
Brand: LEGEND ACETABULAR SHELL

## 2023-08-28 DEVICE — ACETABULAR LINER NEUTRAL 36/52
Type: IMPLANTABLE DEVICE | Site: HIP | Status: FUNCTIONAL
Brand: LEGEND

## 2023-08-28 DEVICE — FEMORAL HEAD 36-12/14+3
Type: IMPLANTABLE DEVICE | Site: HIP | Status: FUNCTIONAL
Brand: DELTA CERAMIC FEMORAL HEAD

## 2023-08-28 RX ORDER — SODIUM CHLORIDE, SODIUM LACTATE, POTASSIUM CHLORIDE, AND CALCIUM CHLORIDE .6; .31; .03; .02 G/100ML; G/100ML; G/100ML; G/100ML
1000 INJECTION, SOLUTION INTRAVENOUS ONCE
Status: DISCONTINUED | OUTPATIENT
Start: 2023-08-28 | End: 2023-08-28 | Stop reason: HOSPADM

## 2023-08-28 RX ORDER — DEXAMETHASONE SODIUM PHOSPHATE 4 MG/ML
8 INJECTION, SOLUTION INTRA-ARTICULAR; INTRALESIONAL; INTRAMUSCULAR; INTRAVENOUS; SOFT TISSUE ONCE
Status: COMPLETED | OUTPATIENT
Start: 2023-08-28 | End: 2023-08-28

## 2023-08-28 RX ORDER — SODIUM CHLORIDE 0.9 % (FLUSH) 0.9 %
5-40 SYRINGE (ML) INJECTION PRN
Status: DISCONTINUED | OUTPATIENT
Start: 2023-08-28 | End: 2023-08-28 | Stop reason: HOSPADM

## 2023-08-28 RX ORDER — DEXAMETHASONE SODIUM PHOSPHATE 4 MG/ML
4 INJECTION, SOLUTION INTRA-ARTICULAR; INTRALESIONAL; INTRAMUSCULAR; INTRAVENOUS; SOFT TISSUE ONCE
Status: COMPLETED | OUTPATIENT
Start: 2023-08-28 | End: 2023-08-28

## 2023-08-28 RX ORDER — PROPOFOL 10 MG/ML
INJECTION, EMULSION INTRAVENOUS CONTINUOUS PRN
Status: DISCONTINUED | OUTPATIENT
Start: 2023-08-28 | End: 2023-08-28 | Stop reason: SDUPTHER

## 2023-08-28 RX ORDER — TRANEXAMIC ACID 650 MG/1
1950 TABLET ORAL ONCE
Status: COMPLETED | OUTPATIENT
Start: 2023-08-28 | End: 2023-08-28

## 2023-08-28 RX ORDER — MELOXICAM 7.5 MG/1
7.5 TABLET ORAL 2 TIMES DAILY
Qty: 28 TABLET | Refills: 0 | Status: SHIPPED | OUTPATIENT
Start: 2023-08-28 | End: 2023-09-11

## 2023-08-28 RX ORDER — CHLORHEXIDINE GLUCONATE 4 G/100ML
SOLUTION TOPICAL ONCE
Status: DISCONTINUED | OUTPATIENT
Start: 2023-08-28 | End: 2023-08-28 | Stop reason: HOSPADM

## 2023-08-28 RX ORDER — ONDANSETRON 2 MG/ML
4 INJECTION INTRAMUSCULAR; INTRAVENOUS
Status: DISCONTINUED | OUTPATIENT
Start: 2023-08-28 | End: 2023-08-28 | Stop reason: HOSPADM

## 2023-08-28 RX ORDER — LIDOCAINE HYDROCHLORIDE 20 MG/ML
INJECTION, SOLUTION EPIDURAL; INFILTRATION; INTRACAUDAL; PERINEURAL PRN
Status: DISCONTINUED | OUTPATIENT
Start: 2023-08-28 | End: 2023-08-28 | Stop reason: SDUPTHER

## 2023-08-28 RX ORDER — SODIUM CHLORIDE 9 MG/ML
INJECTION, SOLUTION INTRAVENOUS PRN
Status: DISCONTINUED | OUTPATIENT
Start: 2023-08-28 | End: 2023-08-28 | Stop reason: HOSPADM

## 2023-08-28 RX ORDER — ACETAMINOPHEN 500 MG
1000 TABLET ORAL ONCE
Status: COMPLETED | OUTPATIENT
Start: 2023-08-28 | End: 2023-08-28

## 2023-08-28 RX ORDER — ASPIRIN 81 MG/1
81 TABLET ORAL 2 TIMES DAILY
Qty: 42 TABLET | Refills: 0 | Status: SHIPPED | OUTPATIENT
Start: 2023-08-28 | End: 2023-09-18

## 2023-08-28 RX ORDER — CEFADROXIL 500 MG/1
500 CAPSULE ORAL 2 TIMES DAILY
Qty: 10 CAPSULE | Refills: 0 | Status: SHIPPED | OUTPATIENT
Start: 2023-08-28 | End: 2023-09-02

## 2023-08-28 RX ORDER — FENTANYL CITRATE 50 UG/ML
25 INJECTION, SOLUTION INTRAMUSCULAR; INTRAVENOUS EVERY 5 MIN PRN
Status: DISCONTINUED | OUTPATIENT
Start: 2023-08-28 | End: 2023-08-28 | Stop reason: HOSPADM

## 2023-08-28 RX ORDER — PHENYLEPHRINE HCL IN 0.9% NACL 1 MG/10 ML
SYRINGE (ML) INTRAVENOUS PRN
Status: DISCONTINUED | OUTPATIENT
Start: 2023-08-28 | End: 2023-08-28 | Stop reason: SDUPTHER

## 2023-08-28 RX ORDER — PROPOFOL 10 MG/ML
INJECTION, EMULSION INTRAVENOUS PRN
Status: DISCONTINUED | OUTPATIENT
Start: 2023-08-28 | End: 2023-08-28 | Stop reason: SDUPTHER

## 2023-08-28 RX ORDER — OXYCODONE HYDROCHLORIDE 5 MG/1
5 TABLET ORAL
Status: DISCONTINUED | OUTPATIENT
Start: 2023-08-28 | End: 2023-08-28 | Stop reason: HOSPADM

## 2023-08-28 RX ORDER — SODIUM CHLORIDE, SODIUM LACTATE, POTASSIUM CHLORIDE, CALCIUM CHLORIDE 600; 310; 30; 20 MG/100ML; MG/100ML; MG/100ML; MG/100ML
INJECTION, SOLUTION INTRAVENOUS CONTINUOUS
Status: DISCONTINUED | OUTPATIENT
Start: 2023-08-28 | End: 2023-08-28 | Stop reason: HOSPADM

## 2023-08-28 RX ORDER — SODIUM CHLORIDE 0.9 % (FLUSH) 0.9 %
5-40 SYRINGE (ML) INJECTION EVERY 12 HOURS SCHEDULED
Status: DISCONTINUED | OUTPATIENT
Start: 2023-08-28 | End: 2023-08-28 | Stop reason: HOSPADM

## 2023-08-28 RX ORDER — PANTOPRAZOLE SODIUM 40 MG/1
40 TABLET, DELAYED RELEASE ORAL ONCE
Status: COMPLETED | OUTPATIENT
Start: 2023-08-28 | End: 2023-08-28

## 2023-08-28 RX ORDER — MIDAZOLAM HYDROCHLORIDE 1 MG/ML
INJECTION INTRAMUSCULAR; INTRAVENOUS
Status: COMPLETED
Start: 2023-08-28 | End: 2023-08-28

## 2023-08-28 RX ORDER — OXYCODONE HYDROCHLORIDE 5 MG/1
5 TABLET ORAL EVERY 4 HOURS PRN
Qty: 42 TABLET | Refills: 0 | Status: SHIPPED | OUTPATIENT
Start: 2023-08-28 | End: 2023-09-04

## 2023-08-28 RX ORDER — DEXAMETHASONE SODIUM PHOSPHATE 4 MG/ML
8 INJECTION, SOLUTION INTRA-ARTICULAR; INTRALESIONAL; INTRAMUSCULAR; INTRAVENOUS; SOFT TISSUE ONCE
Status: DISCONTINUED | OUTPATIENT
Start: 2023-08-28 | End: 2023-08-28

## 2023-08-28 RX ORDER — IPRATROPIUM BROMIDE AND ALBUTEROL SULFATE 2.5; .5 MG/3ML; MG/3ML
1 SOLUTION RESPIRATORY (INHALATION)
Status: DISCONTINUED | OUTPATIENT
Start: 2023-08-28 | End: 2023-08-28 | Stop reason: HOSPADM

## 2023-08-28 RX ORDER — HYDROMORPHONE HYDROCHLORIDE 1 MG/ML
0.5 INJECTION, SOLUTION INTRAMUSCULAR; INTRAVENOUS; SUBCUTANEOUS EVERY 5 MIN PRN
Status: DISCONTINUED | OUTPATIENT
Start: 2023-08-28 | End: 2023-08-28 | Stop reason: HOSPADM

## 2023-08-28 RX ORDER — DIPHENHYDRAMINE HYDROCHLORIDE 50 MG/ML
12.5 INJECTION INTRAMUSCULAR; INTRAVENOUS
Status: DISCONTINUED | OUTPATIENT
Start: 2023-08-28 | End: 2023-08-28 | Stop reason: HOSPADM

## 2023-08-28 RX ORDER — LABETALOL HYDROCHLORIDE 5 MG/ML
10 INJECTION, SOLUTION INTRAVENOUS
Status: DISCONTINUED | OUTPATIENT
Start: 2023-08-28 | End: 2023-08-28 | Stop reason: HOSPADM

## 2023-08-28 RX ORDER — PROCHLORPERAZINE EDISYLATE 5 MG/ML
5 INJECTION INTRAMUSCULAR; INTRAVENOUS
Status: DISCONTINUED | OUTPATIENT
Start: 2023-08-28 | End: 2023-08-28 | Stop reason: HOSPADM

## 2023-08-28 RX ORDER — MIDAZOLAM HYDROCHLORIDE 1 MG/ML
INJECTION INTRAMUSCULAR; INTRAVENOUS PRN
Status: DISCONTINUED | OUTPATIENT
Start: 2023-08-28 | End: 2023-08-28 | Stop reason: SDUPTHER

## 2023-08-28 RX ADMIN — DEXAMETHASONE SODIUM PHOSPHATE 8 MG: 4 INJECTION INTRA-ARTICULAR; INTRALESIONAL; INTRAMUSCULAR; INTRAVENOUS; SOFT TISSUE at 14:11

## 2023-08-28 RX ADMIN — SODIUM CHLORIDE, POTASSIUM CHLORIDE, SODIUM LACTATE AND CALCIUM CHLORIDE: 600; 310; 30; 20 INJECTION, SOLUTION INTRAVENOUS at 09:14

## 2023-08-28 RX ADMIN — SODIUM CHLORIDE, POTASSIUM CHLORIDE, SODIUM LACTATE AND CALCIUM CHLORIDE: 600; 310; 30; 20 INJECTION, SOLUTION INTRAVENOUS at 14:27

## 2023-08-28 RX ADMIN — PROPOFOL 25 MCG/KG/MIN: 10 INJECTION, EMULSION INTRAVENOUS at 12:49

## 2023-08-28 RX ADMIN — MEPIVACAINE HYDROCHLORIDE 45 MG: 15 INJECTION, SOLUTION EPIDURAL; INFILTRATION at 12:34

## 2023-08-28 RX ADMIN — ACETAMINOPHEN 1000 MG: 500 TABLET ORAL at 09:15

## 2023-08-28 RX ADMIN — WATER 2000 MG: 1 INJECTION INTRAMUSCULAR; INTRAVENOUS; SUBCUTANEOUS at 12:49

## 2023-08-28 RX ADMIN — TRANEXAMIC ACID 1950 MG: 650 TABLET ORAL at 09:15

## 2023-08-28 RX ADMIN — PANTOPRAZOLE SODIUM 40 MG: 40 TABLET, DELAYED RELEASE ORAL at 09:15

## 2023-08-28 RX ADMIN — SODIUM CHLORIDE, SODIUM LACTATE, POTASSIUM CHLORIDE, AND CALCIUM CHLORIDE 1000 ML: .6; .31; .03; .02 INJECTION, SOLUTION INTRAVENOUS at 09:14

## 2023-08-28 RX ADMIN — MIDAZOLAM 2 MG: 1 INJECTION INTRAMUSCULAR; INTRAVENOUS at 12:22

## 2023-08-28 RX ADMIN — PROPOFOL 50 MG: 10 INJECTION, EMULSION INTRAVENOUS at 12:41

## 2023-08-28 RX ADMIN — DEXAMETHASONE SODIUM PHOSPHATE 4 MG: 4 INJECTION INTRA-ARTICULAR; INTRALESIONAL; INTRAMUSCULAR; INTRAVENOUS; SOFT TISSUE at 09:15

## 2023-08-28 RX ADMIN — LIDOCAINE HYDROCHLORIDE 100 MG: 20 INJECTION, SOLUTION EPIDURAL; INFILTRATION; INTRACAUDAL; PERINEURAL at 12:41

## 2023-08-28 RX ADMIN — DEXMEDETOMIDINE HYDROCHLORIDE 0.2 MCG/KG/HR: 100 INJECTION, SOLUTION INTRAVENOUS at 12:49

## 2023-08-28 RX ADMIN — Medication 100 MCG: at 13:44

## 2023-08-28 ASSESSMENT — PAIN SCALES - GENERAL
PAINLEVEL_OUTOF10: 0
PAINLEVEL_OUTOF10: 0

## 2023-08-28 ASSESSMENT — PAIN - FUNCTIONAL ASSESSMENT: PAIN_FUNCTIONAL_ASSESSMENT: 0-10

## 2023-08-28 ASSESSMENT — PAIN DESCRIPTION - DESCRIPTORS: DESCRIPTORS: ACHING;DULL

## 2023-08-28 NOTE — PERIOP NOTE
Patient assisted to the bathroom with use of walker, patient able to void.  Patient assisted to the chair

## 2023-08-28 NOTE — ANESTHESIA POSTPROCEDURE EVALUATION
Department of Anesthesiology  Postprocedure Note    Patient: Roxanna Jackson  MRN: [id number]  YOB: 1945  Date of evaluation: 8/28/2023      Procedure Summary     Date: 08/28/23 Room / Location: THE [address] / [address] OR    Anesthesia Start: 1222 Anesthesia Stop:     Procedure: RIGHT TOTAL HIP REPLACEMENT ANTERIOR APPROACH W/C-ARM (Right: Hip) Diagnosis:       Primary osteoarthritis of right hip      (Primary osteoarthritis of right hip [M16.11])    Surgeons: Jonathan Yoder MD Responsible Provider: Akbar Friday, DO    Anesthesia Type: MAC, Spinal ASA Status: 3          Anesthesia Type: MAC, Spinal    Zacarias Phase I: Zacarias Score: 10    Zacarias Phase II:        Anesthesia Post Evaluation    Patient location during evaluation: PACU  Patient participation: complete - patient participated  Level of consciousness: awake  Pain score: 0  Airway patency: patent  Nausea & Vomiting: no nausea and no vomiting  Complications: no  Cardiovascular status: blood pressure returned to baseline  Respiratory status: acceptable  Hydration status: stable  Multimodal analgesia pain management approach  Pain management: satisfactory to patient

## 2023-08-28 NOTE — PROGRESS NOTES
Occupational Therapy Goals:  Initiated 8/28/2023 to be met within 7 days. Patient will perform toileting with mod I  Patient will perform lower body dressing with mod I and A/E PRN  Patient will perform bathroom mobility with mod I  Patient will perform grooming standing at sink with mod I  Patient will perform toilet transfer with mod I         OCCUPATIONAL THERAPY EVALUATION    Patient: Zachary Meredith (2 y.o. female)  Date: 8/28/2023  Primary Diagnosis: Primary osteoarthritis of right hip [M16.11]  Procedure(s) (LRB):  RIGHT TOTAL HIP REPLACEMENT ANTERIOR APPROACH W/C-ARM (Right) Day of Surgery   Precautions: Fall Risk, Weight Bearing, Right Lower Extremity Weight Bearing: Weight Bearing As Tolerated,  ,  ,  ,  ,  , Hip Precautions: Anterior hip precautions  PLOF: pt mod I for ADLs/functional mobility    ASSESSMENT :  Based on the objective data described below, the patient presents with RLE decreased ROM and strength affecting LE ADLs. Pt found seated in recliner chair, vitals assessed and WNL, pt reporting pain 7/10, agreeable to therapy. Educated pt on proper body mechanics for ADLs s/p THR. Pt completed upper body dressing with supervision. Pt able to thread B feet through underwear/pants without assist, and CGA when standing to pull up to waist. Pt required CGA for STS/bathroom mobility/toilet transfer with vc for safe use of RW. Pt voided and performed bladder hygiene with SBA. Pt ambulated back to recliner, ice applied to R hip. Spouse present during session for education on home safety. Provided opportunity for pt to voice questions on ADL performance when home, pt has no further concerns. Patient will benefit from skilled Occupational Therapy intervention to maximize safety/independence with ADLs at d/c.    DEFICITS/IMPAIRMENTS:  Performance deficits / Impairments: Decreased functional mobility ; Decreased high-level IADLs;Decreased ADL status; Decreased endurance;Decreased ROM; Decreased

## 2023-08-28 NOTE — ANESTHESIA PROCEDURE NOTES
Spinal Block    Patient location during procedure: OR  End time: 8/28/2023 12:33 PM  Reason for block: primary anesthetic  Staffing  Performed: anesthesiologist   Anesthesiologist: Danica Graves, DO  Spinal Block  Patient position: sitting  Prep: Betadine  Patient monitoring: cardiac monitor, continuous pulse ox and frequent blood pressure checks  Approach: midline  Location: L3/L4  Provider prep: sterile gloves and mask  Needle  Needle type: Pencan   Needle gauge: 25 G  Needle length: 3.5 in  Assessment  Sensory level: T6  Swirl obtained: Yes  CSF: clear  Attempts: 1  Hemodynamics: stable  Preanesthetic Checklist  Completed: patient identified, IV checked, site marked, risks and benefits discussed, surgical/procedural consents, equipment checked, pre-op evaluation, timeout performed, anesthesia consent given, oxygen available, monitors applied/VS acknowledged, fire risk safety assessment completed and verbalized and blood product R/B/A discussed and consented

## 2023-08-28 NOTE — PERIOP NOTE
Pt. Used restroom in pre-op area with assistance. Patient placed on [place] [employer] for a minimum of 30 min in  Preop.

## 2023-08-28 NOTE — PERIOP NOTE
TRANSFER - IN REPORT:    Verbal report received from [facility]  being received from PACU for routine progression of patient care      Report consisted of patient's Situation, Background, Assessment and   Recommendations(SBAR). Information from the following report(s) Nurse Handoff Report was reviewed with the receiving nurse. Opportunity for questions and clarification was provided. Assessment completed upon patient's arrival to unit and care assumed.

## 2023-08-28 NOTE — ADDENDUM NOTE
Addendum  created 08/28/23 1434 by KALI Chris CRNA    Flowsheet accepted, Intraprocedure Event edited, Intraprocedure Staff edited

## 2023-08-28 NOTE — OP NOTE
The head and neck were removed. The pulvinar and labrum were excised. The acetabulum was then reamed up to 52 mm with good bleeding cancellous bone obtained. The cup was then irrigated with pulse lavage system. A 52 mm [employer] Legend cup was then impacted in place with excellent stable fixation obtained, placing the cup at about 45 degrees of abduction, 20 degrees of anteversion. The liner was then impacted in place. A screw was not placed. Attention was turned to the femur, which was delivered into the wound with a combination of extension, external rotation, and adduction, and using the hook on the [place] table to deliver the femur into the wound. The canal was broached up to a size 10 for the Entrada stem system with excellent stable fixation obtained. A trial reduction was then performed with the standard neck offset and 36 mm head balls with various neck lengths. With the +3, she appeared to have equalization of leg lengths and restoration of offset radiographically using the c-arm and joint point navigation system, and excellent functional stability was noted. The trial broach was removed. The canal was irrigated with the pulse lavage system. The final components were impacted in place with excellent stable fixation obtained once again. The final reduction was performed and once again leg lengths and offset were restored radiographically, using the C-arm radiographically intraoperatively, and excellent functional stability was noted. The wound was then irrigated one more time, and then closed in layers. The fascia of the tensor was closed with #1 Vicryl in a running type stitch. Subcutaneous tissue was closed with 2-0 Vicryl in a simple buried stitch, and the skin was closed with Prineo. Dry, sterile dressing was then applied. She tolerated this well, was transferred to the bed, and taken to recovery room, extubated, in stable condition. All sponge and needle counts were correct.     Signed By:

## 2023-08-28 NOTE — PROGRESS NOTES
Physical Therapy Goals:  Pt goals to be met in 1 day:  Pt will be able to perform supine<>sit SBA for transfer ease at home. Pt will be able to perform sit<>stand SBA for increased ability to transfer at home safely. Pt will be able to participate in gait training >100' w/ RW, WBAT, GB and CGA/SBA for improved ability in home upon d/c. Pt will be able to perform stair training step to pattern, B/U rail and CGA to obtain safe entry into home upon d/c. Pt will be educated regarding HEP per MD protocol for optimal AROM/strength outcomes. [x]  Patient has met MD mobilization critieria for d/c home   [x]  Recommend HH with 24 hour adult care   []  Benefit from additional acute PT session to address:      PHYSICAL THERAPY EVALUATION    Patient: Cassandra Banerjee (89 y.o. female)  Date: 8/28/2023  Primary Diagnosis: Primary osteoarthritis of right hip [M16.11]  Procedure(s) (LRB):  RIGHT TOTAL HIP REPLACEMENT ANTERIOR APPROACH W/C-ARM (Right) Day of Surgery   Precautions: Fall Risk, Weight Bearing, Right Lower Extremity Weight Bearing: Weight Bearing As Tolerated,  ,  ,  ,  ,  , Hip Precautions: Anterior hip precautions  PLOF: Independent    ASSESSMENT :  Based on the objective data described below, the patient presents with decreased mobility in regards to bed mobility, transfers, gait quality, gait tolerance, balance, stair negotiation and safety due to R JOHANNE surgery. Decreased AROM of R hip, dec strength R hip, pain in R hip, decreased sensation of R hip, also impacting functional mobility. Using numerical pain scale, pt rated pain 8/10 pre/during/post session. Pt and caregiver ed regarding mobility safety, WB, HEP, ice application/use, elevation, environmental safety and home safe techniques. Pt sitting in recliner upon arrival.  Able to perform sit<>stand w/ CGA/SBA. Safety vc required throughout session to reinforce safety. Gait training using RW, GB, WBAT and CGA w/ antalgic gait pattern.   Stair training

## 2023-08-28 NOTE — DISCHARGE SUMMARY
Dulaglutide           * This list has 2 medication(s) that are the same as other medications prescribed for you. Read the directions carefully, and ask your doctor or other care provider to review them with you. STOP taking these medications      aspirin 81 MG chewable tablet  Replaced by: aspirin 81 MG EC tablet     diclofenac sodium 1 % Gel  Commonly known as: VOLTAREN               Where to Get Your Medications        These medications were sent to [address] [place] [address] [address] [facility]      Phone: [phone]   aspirin 81 MG EC tablet  cefadroxil 500 MG capsule  meloxicam 7.5 MG tablet  oxyCODONE 5 MG immediate release tablet           The patient is to continue at home with home physical therapy 3 times a week to work on gait training, range of motion, strengthening, and weightbearing exercises as tolerated on her right lower extremity. The patient is to progress from a walker to a cane to complete total weightbearing as tolerable. The patient is to continue to keep her incision dry. The patient is to followup with Dr. Neela Burton, Ximena Rinaldi PAAnupamaC, and/or [facility] in the office approximately 10-14 days status post for x-rays and further evaluation.       CALEB Mead  8/31/2023

## 2023-08-28 NOTE — PERIOP NOTE
TRANSFER - OUT REPORT:    Verbal report given to Perla Burger RN on [facility] [employer]  being transferred to phase two for routine progression of patient care       Report consisted of patient's Situation, Background, Assessment and   Recommendations(SBAR). Information from the following report(s) Surgery Report, Intake/Output, MAR, and Recent Results was reviewed with the receiving nurse. Lines:   Peripheral IV 08/28/23 Left Hand (Active)   Site Assessment Clean, dry & intact 08/28/23 1327   Line Status Infusing 08/28/23 [address] Connections checked and tightened 08/28/23 1241   Phlebitis Assessment No symptoms 08/28/23 1327   Infiltration Assessment 0 08/28/23 1327   Alcohol Cap Used No 08/28/23 0914   Dressing Status Clean, dry & intact 08/28/23 1327   Dressing Type Transparent 08/28/23 1327        Opportunity for questions and clarification was provided.       Patient transported with:  Registered Nurse

## 2023-08-28 NOTE — ANESTHESIA PRE PROCEDURE
Department of Anesthesiology  Preprocedure Note       Name:  Leona Altman   Age:  66 y.o.  :  1945                                          MRN:  [id number]         Date:  2023      Surgeon: Lainey Mckeon):  Javan Alexander MD    Procedure: Procedure(s):  RIGHT TOTAL HIP REPLACEMENT ANTERIOR APPROACH W/C-ARM    Medications prior to admission:   Prior to Admission medications    Medication Sig Start Date End Date Taking? Authorizing Provider   lisinopril-hydroCHLOROthiazide (PRINZIDE;ZESTORETIC) 20-12.5 MG per tablet Take by mouth daily Indications: HTN 23   Historical Provider, MD   SITagliptin (JANUVIA) 100 MG tablet Take 1 tablet by mouth daily Indications: Type II DM    Historical Provider, MD   rOPINIRole (REQUIP) 0.5 MG tablet Take 1 tablet by mouth daily Indications: RLS takes at 3pm    Historical Provider, MD   rOPINIRole (REQUIP) 1 MG tablet Take 1 tablet by mouth every evening Indications: RLS- takes at 8pm    Historical Provider, MD   rosuvastatin (CRESTOR) 40 MG tablet Take 0.5 tablets by mouth every evening Indications: hyperlipidemia    Historical Provider, MD   CRANBERRY PO Take by mouth Indications: Prevention of Frequently Occuring Urinary Tract Infections    Historical Provider, MD   Multiple Vitamins-Minerals (PRESERVISION AREDS PO) Take by mouth    Historical Provider, MD   diclofenac sodium (VOLTAREN) 1 % GEL Apply topically 2 times daily    Historical Provider, MD   acetaminophen (TYLENOL) 500 MG tablet Take 1 tablet by mouth every 6 hours as needed for Pain    Historical Provider, MD   Dulaglutide (TRULICITY) 2.81 TI/1.9HP SOPN Inject into the skin once a week Indications: Sundays. Type II DM 3/16/23   Historical Provider, MD   aspirin 81 MG chewable tablet Take 1 tablet by mouth daily Indications: preventative    Ar Automatic Reconciliation   brimonidine-timolol (COMBIGAN) 0.2-0.5 % ophthalmic solution Place 2 drops into both eyes in the morning and 2 drops in the evening.

## 2023-08-28 NOTE — INTERVAL H&P NOTE
Update History & Physical    The patient's History and Physical of August 27, 2023 was reviewed with the patient and I examined the patient. There was no change. The surgical site was confirmed by the patient and me. Plan: The risks, benefits, expected outcome, and alternative to the recommended procedure have been discussed with the patient. Patient understands and wants to proceed with the procedure.      Electronically signed by Jennifer Shearer MD on 8/28/2023 at 10:39 AM

## 2023-08-28 NOTE — H&P
[facility]  History and Physical Exam    Patient: Estefany Deutsch MRN: [id number]  SSN: [id number]    YOB: 1945  Age: 66 y.o. Sex: female      Subjective:      Chief Complaint: right hip pain    History of Present Illness:  Patient complains of right hip pain and difficulty ambulating, which has progressed over the past several months. X-rays showed osteoarthritis of the joint. The patient's pain has persisted and progressed despite conservative treatments and therapies. The patient has been previously treated with medications and/or injections. The patient has at this time opted for surgical intervention. Past Medical History:   Diagnosis Date    Advance directive discussed with patient 04/06/2023    denies having one    Diabetes ([address]) 2003    Januvia & Trulicity. Type II DM, managed by PCP    Frequent UTI 2019    hx of frequent UTI after COVID started    History of blood transfusion 1983    after hysterectomy    Hyperlipidemia 1997    rosuvastatin at night. Hypertension 1996    Lisinopril-HCTZ.  managed by new PCP Dr. Dudley Brought in [place]. OAB (overactive bladder) 2020    had cystoscopy, botox injection to bladder . Urologist Dr. Rama Samano    Osteoarthritis 06/25/2023    OA hips. Orthopedic Dr. Borden Overcast    Restless leg syndrome 2011    takes Requip in afternoon and evening.     Retinitis pigmentosa of both eyes 2015    Ophthalmologist Dr. Jade Duenas    Wears prescription eyeglasses     had bilateral cataract surgery     Past Surgical History:   Procedure Laterality Date    BREAST SURGERY Right 1984    calcium deposit taken out right breast    CATARACT EXTRACTION, BILATERAL Bilateral 06/2016    COLONOSCOPY      x 3    CYSTOSCOPY N/A 04/18/2023    cystoscopy, botox injection to the bladder (100units) Dr. Amber Fernandez  06/07/2022    Dr. Bahman Wood Right 2009    right hammer toe and bunionectomy

## (undated) DEVICE — ZIP 16 SURGICAL SKIN CLOSURE DEVICE: Brand: ZIP 16 SURGICAL SKIN CLOSURE DEVICE

## (undated) DEVICE — SOLUTION IV 1000ML LAC RINGERS PH 6.5 INJ USP VIAFLX PLAS

## (undated) DEVICE — STRYKER PERFORMANCE SERIES SAGITTAL BLADE: Brand: STRYKER PERFORMANCE SERIES

## (undated) DEVICE — SUTURE VCRL + SZ 1 L36IN ABSRB UD L36MM CT-1 1/2 CIR VCP947H

## (undated) DEVICE — GRIPPER SURGICAL RETRACTOR DISP

## (undated) DEVICE — GARMENT,MEDLINE,DVT,INT,CALF,MED, GEN2: Brand: MEDLINE

## (undated) DEVICE — PENCIL ES L3M ROCK SWCH S STL HEX LOK BLDE ELECTRD HOLSTER

## (undated) DEVICE — WEREWOLF FASTSEAL 6.0 HEMOSTASIS WAND: Brand: FASTSEAL 6.0 HEMOSTASIS WAND

## (undated) DEVICE — SOLUTION IV NACL 0.9% 100 ML FLX CONTAINER

## (undated) DEVICE — SOLUTION IRRIG 3000ML 0.9% SOD CHL FLX CONT 0797208] ICU MEDICAL INC]

## (undated) DEVICE — TUBING, SUCTION, 1/4" X 12', STRAIGHT: Brand: MEDLINE

## (undated) DEVICE — CYSTO PACK: Brand: MEDLINE INDUSTRIES, INC.

## (undated) DEVICE — SOLUTION IV 100ML 0.9% SOD CHL DIL INJ

## (undated) DEVICE — SOLUTION IRRIG 1500ML STRL H2O USP POUR PLAS BTL

## (undated) DEVICE — ELECTRODE PT RET AD L9FT HI MOIST COND ADH HYDRGEL CORDED

## (undated) DEVICE — SOLUTION IRRIG 500ML 0.9% SOD CHLO USP POUR PLAS BTL

## (undated) DEVICE — STERILE LATEX POWDER-FREE SURGICAL GLOVESWITH NITRILE COATING: Brand: PROTEXIS

## (undated) DEVICE — BLADE ES L6IN ELASTOMERIC COAT EXT DURABLE BEND UPTO 90DEG

## (undated) DEVICE — STRAP,POSITIONING,KNEE/BODY,FOAM,4X60": Brand: MEDLINE

## (undated) DEVICE — DRAPE ISOLATN PT ST W/POCKET

## (undated) DEVICE — PACK PROCEDURE SURG TOT HIP ANTR CARTER CUST

## (undated) DEVICE — POUCH DRNGE FLX BND INTEGR RAIL CLMP DISP EZ CTCH

## (undated) DEVICE — SUTURE VCRL + SZ 2-0 L36IN ABSRB UD L36MM CT-1 1/2 CIR VCP945H

## (undated) DEVICE — MARKER,SKIN,WI/RULER AND LABELS: Brand: MEDLINE